# Patient Record
Sex: MALE | Race: AMERICAN INDIAN OR ALASKA NATIVE | Employment: OTHER | URBAN - METROPOLITAN AREA
[De-identification: names, ages, dates, MRNs, and addresses within clinical notes are randomized per-mention and may not be internally consistent; named-entity substitution may affect disease eponyms.]

---

## 2017-01-10 RX ORDER — FUROSEMIDE 20 MG/1
TABLET ORAL
Qty: 90 TABLET | Refills: 1 | Status: SHIPPED | OUTPATIENT
Start: 2017-01-10 | End: 2017-01-19 | Stop reason: SDUPTHER

## 2017-01-19 DIAGNOSIS — N52.1 ERECTILE DYSFUNCTION DUE TO DISEASES CLASSIFIED ELSEWHERE: ICD-10-CM

## 2017-01-20 RX ORDER — SILDENAFIL 50 MG/1
50 TABLET, FILM COATED ORAL PRN
Qty: 24 TABLET | Refills: 0 | Status: SHIPPED | OUTPATIENT
Start: 2017-01-20 | End: 2018-01-26

## 2017-01-20 RX ORDER — OLMESARTAN MEDOXOMIL 20 MG/1
TABLET ORAL
Qty: 90 TABLET | Refills: 0 | Status: SHIPPED | OUTPATIENT
Start: 2017-01-20 | End: 2017-06-27 | Stop reason: SDUPTHER

## 2017-01-20 RX ORDER — ALLOPURINOL 300 MG/1
TABLET ORAL
Qty: 90 TABLET | Refills: 0 | Status: SHIPPED | OUTPATIENT
Start: 2017-01-20 | End: 2017-06-27 | Stop reason: SDUPTHER

## 2017-01-20 RX ORDER — GLIMEPIRIDE 4 MG/1
TABLET ORAL
Qty: 90 TABLET | Refills: 0 | Status: SHIPPED | OUTPATIENT
Start: 2017-01-20 | End: 2017-05-18 | Stop reason: SDUPTHER

## 2017-01-20 RX ORDER — FUROSEMIDE 20 MG/1
TABLET ORAL
Qty: 90 TABLET | Refills: 0 | Status: SHIPPED | OUTPATIENT
Start: 2017-01-20 | End: 2017-04-18 | Stop reason: SDUPTHER

## 2017-04-18 RX ORDER — FUROSEMIDE 20 MG/1
TABLET ORAL
Qty: 90 TABLET | Refills: 0 | Status: SHIPPED | OUTPATIENT
Start: 2017-04-18 | End: 2017-07-17 | Stop reason: SDUPTHER

## 2017-05-18 RX ORDER — GLIMEPIRIDE 4 MG/1
TABLET ORAL
Qty: 90 TABLET | Refills: 0 | Status: SHIPPED | OUTPATIENT
Start: 2017-05-18 | End: 2017-08-14 | Stop reason: SDUPTHER

## 2017-05-20 ENCOUNTER — HOSPITAL ENCOUNTER (OUTPATIENT)
Dept: OTHER | Age: 57
Discharge: HOME | End: 2017-05-20
Attending: FAMILY MEDICINE | Admitting: FAMILY MEDICINE

## 2017-05-20 LAB
A/G RATIO: 1.4 (ref 1.1–2.2)
ALBUMIN SERPL-MCNC: 4.2 G/DL (ref 3.4–5)
ALP BLD-CCNC: 114 U/L (ref 40–129)
ALT SERPL-CCNC: 86 U/L (ref 10–40)
ANION GAP SERPL CALCULATED.3IONS-SCNC: 13 MMOL/L (ref 3–16)
AST SERPL-CCNC: 68 U/L (ref 15–37)
BILIRUB SERPL-MCNC: 0.6 MG/DL (ref 0–1)
BUN BLDV-MCNC: 15 MG/DL (ref 7–20)
CALCIUM SERPL-MCNC: 9.6 MG/DL (ref 8.3–10.6)
CHLORIDE BLD-SCNC: 96 MMOL/L (ref 99–110)
CHOLESTEROL, TOTAL: 186 MG/DL (ref 0–199)
CO2: 28 MMOL/L (ref 21–32)
CREAT SERPL-MCNC: 1.1 MG/DL (ref 0.9–1.3)
CREATININE URINE: 128 MG/DL (ref 39–259)
GFR AFRICAN AMERICAN: >60
GFR NON-AFRICAN AMERICAN: >60
GLOBULIN: 3 G/DL
GLUCOSE BLD-MCNC: 273 MG/DL (ref 70–99)
HDLC SERPL-MCNC: 41 MG/DL (ref 40–60)
LDL CHOLESTEROL CALCULATED: 113 MG/DL
MICROALBUMIN UR-MCNC: <1.2 MG/DL
MICROALBUMIN/CREAT UR-RTO: NORMAL MG/G (ref 0–30)
POTASSIUM SERPL-SCNC: 5 MMOL/L (ref 3.5–5.1)
PROSTATE SPECIFIC ANTIGEN: 4.6 NG/ML (ref 0–4)
SODIUM BLD-SCNC: 137 MMOL/L (ref 136–145)
TOTAL PROTEIN: 7.2 G/DL (ref 6.4–8.2)
TRIGL SERPL-MCNC: 162 MG/DL (ref 0–150)
VLDLC SERPL CALC-MCNC: 32 MG/DL

## 2017-05-21 LAB
ESTIMATED AVERAGE GLUCOSE: 286.2 MG/DL
HBA1C MFR BLD: 11.6 %

## 2017-06-10 RX ORDER — SITAGLIPTIN 100 MG/1
TABLET, FILM COATED ORAL
Qty: 90 TABLET | Refills: 0 | Status: SHIPPED | OUTPATIENT
Start: 2017-06-10 | End: 2017-09-11 | Stop reason: SDUPTHER

## 2017-06-19 ENCOUNTER — OFFICE VISIT (OUTPATIENT)
Dept: FAMILY MEDICINE CLINIC | Age: 57
End: 2017-06-19

## 2017-06-19 VITALS
BODY MASS INDEX: 41.18 KG/M2 | DIASTOLIC BLOOD PRESSURE: 74 MMHG | HEART RATE: 96 BPM | HEIGHT: 69 IN | WEIGHT: 278 LBS | OXYGEN SATURATION: 97 % | SYSTOLIC BLOOD PRESSURE: 118 MMHG

## 2017-06-19 DIAGNOSIS — I10 ESSENTIAL HYPERTENSION: ICD-10-CM

## 2017-06-19 DIAGNOSIS — E66.01 OBESITY, MORBID, BMI 40.0-49.9 (HCC): ICD-10-CM

## 2017-06-19 DIAGNOSIS — R97.20 ELEVATED PSA: ICD-10-CM

## 2017-06-19 PROCEDURE — 99214 OFFICE O/P EST MOD 30 MIN: CPT | Performed by: FAMILY MEDICINE

## 2017-06-19 ASSESSMENT — PATIENT HEALTH QUESTIONNAIRE - PHQ9
SUM OF ALL RESPONSES TO PHQ QUESTIONS 1-9: 0
1. LITTLE INTEREST OR PLEASURE IN DOING THINGS: 0
2. FEELING DOWN, DEPRESSED OR HOPELESS: 0
SUM OF ALL RESPONSES TO PHQ9 QUESTIONS 1 & 2: 0

## 2017-06-21 ENCOUNTER — TELEPHONE (OUTPATIENT)
Dept: FAMILY MEDICINE CLINIC | Age: 57
End: 2017-06-21

## 2017-06-22 ENCOUNTER — TELEPHONE (OUTPATIENT)
Dept: FAMILY MEDICINE CLINIC | Age: 57
End: 2017-06-22

## 2017-06-27 RX ORDER — ALLOPURINOL 300 MG/1
TABLET ORAL
Qty: 90 TABLET | Refills: 0 | Status: SHIPPED | OUTPATIENT
Start: 2017-06-27 | End: 2017-09-22 | Stop reason: SDUPTHER

## 2017-06-27 RX ORDER — OLMESARTAN MEDOXOMIL 20 MG/1
TABLET ORAL
Qty: 90 TABLET | Refills: 0 | Status: SHIPPED | OUTPATIENT
Start: 2017-06-27 | End: 2017-09-22 | Stop reason: SDUPTHER

## 2017-07-17 RX ORDER — FUROSEMIDE 20 MG/1
TABLET ORAL
Qty: 90 TABLET | Refills: 1 | Status: SHIPPED | OUTPATIENT
Start: 2017-07-17 | End: 2018-01-26 | Stop reason: SDUPTHER

## 2017-08-14 RX ORDER — GLIMEPIRIDE 4 MG/1
TABLET ORAL
Qty: 90 TABLET | Refills: 1 | Status: SHIPPED | OUTPATIENT
Start: 2017-08-14 | End: 2018-01-26 | Stop reason: SDUPTHER

## 2017-09-22 RX ORDER — ALLOPURINOL 300 MG/1
TABLET ORAL
Qty: 90 TABLET | Refills: 0 | Status: SHIPPED | OUTPATIENT
Start: 2017-09-22 | End: 2017-12-26 | Stop reason: SDUPTHER

## 2017-09-22 RX ORDER — OLMESARTAN MEDOXOMIL 20 MG/1
TABLET ORAL
Qty: 90 TABLET | Refills: 0 | Status: SHIPPED | OUTPATIENT
Start: 2017-09-22 | End: 2017-12-26 | Stop reason: SDUPTHER

## 2017-12-09 ENCOUNTER — HOSPITAL ENCOUNTER (OUTPATIENT)
Dept: OTHER | Age: 57
Discharge: HOME | End: 2017-12-09
Attending: FAMILY MEDICINE | Admitting: FAMILY MEDICINE

## 2017-12-09 LAB
ANION GAP SERPL CALCULATED.3IONS-SCNC: 13 MMOL/L (ref 3–16)
BUN BLDV-MCNC: 16 MG/DL (ref 7–20)
CALCIUM SERPL-MCNC: 9.5 MG/DL (ref 8.3–10.6)
CHLORIDE BLD-SCNC: 99 MMOL/L (ref 99–110)
CO2: 27 MMOL/L (ref 21–32)
CREAT SERPL-MCNC: 1.2 MG/DL (ref 0.9–1.3)
GFR AFRICAN AMERICAN: >60
GFR NON-AFRICAN AMERICAN: >60
GLUCOSE BLD-MCNC: 222 MG/DL (ref 70–99)
POTASSIUM SERPL-SCNC: 4.8 MMOL/L (ref 3.5–5.1)
PROSTATE SPECIFIC ANTIGEN: 3.7 NG/ML (ref 0–4)
SODIUM BLD-SCNC: 139 MMOL/L (ref 136–145)

## 2017-12-10 LAB
ESTIMATED AVERAGE GLUCOSE: 292 MG/DL
HBA1C MFR BLD: 11.8 %

## 2017-12-11 RX ORDER — SITAGLIPTIN 100 MG/1
TABLET, FILM COATED ORAL
Qty: 90 TABLET | Refills: 0 | Status: SHIPPED | OUTPATIENT
Start: 2017-12-11 | End: 2018-01-26 | Stop reason: SDUPTHER

## 2017-12-20 RX ORDER — OLMESARTAN MEDOXOMIL 20 MG/1
TABLET ORAL
Qty: 90 TABLET | Refills: 0 | OUTPATIENT
Start: 2017-12-20

## 2017-12-23 ENCOUNTER — TELEPHONE (OUTPATIENT)
Dept: FAMILY MEDICINE CLINIC | Age: 57
End: 2017-12-23

## 2017-12-23 NOTE — TELEPHONE ENCOUNTER
olmesartan (BENICAR) 20 MG tablet 90 tablet 0 9/22/2017     Sig: TAKE 1 TABLET BY MOUTH EVERY DAY      allopurinol (ZYLOPRIM) 300 MG tablet 90 tablet 0 9/22/2017     Sig: TAKE 1 TABLET BY MOUTH DAILY      Pt has appt scheduled 1/26 is requesting enough to get him through until then    Has colonoscopy 1/12  (FYI)    [facility] [address]

## 2017-12-26 RX ORDER — ALLOPURINOL 300 MG/1
TABLET ORAL
Qty: 30 TABLET | Refills: 0 | Status: SHIPPED | OUTPATIENT
Start: 2017-12-26 | End: 2018-01-26 | Stop reason: SDUPTHER

## 2017-12-26 RX ORDER — OLMESARTAN MEDOXOMIL 20 MG/1
TABLET ORAL
Qty: 30 TABLET | Refills: 0 | Status: SHIPPED | OUTPATIENT
Start: 2017-12-26 | End: 2018-01-26 | Stop reason: SDUPTHER

## 2018-01-12 ENCOUNTER — HOSPITAL ENCOUNTER (OUTPATIENT)
Dept: ENDOSCOPY | Age: 58
Discharge: HOME | End: 2018-01-12
Attending: INTERNAL MEDICINE | Admitting: INTERNAL MEDICINE

## 2018-01-12 LAB
GLUCOSE BLD-MCNC: 182 MG/DL (ref 70–99)
GLUCOSE BLD-MCNC: 217 MG/DL (ref 70–99)
PERFORMED ON: ABNORMAL
PERFORMED ON: ABNORMAL

## 2018-01-12 RX ORDER — SODIUM CHLORIDE 0.9 % (FLUSH) 0.9 %
10 SYRINGE (ML) INJECTION PRN
Status: DISCONTINUED | OUTPATIENT
Start: 2018-01-12 | End: 2018-01-13 | Stop reason: HOSPADM

## 2018-01-12 RX ORDER — SODIUM CHLORIDE 0.9 % (FLUSH) 0.9 %
10 SYRINGE (ML) INJECTION EVERY 12 HOURS SCHEDULED
Status: DISCONTINUED | OUTPATIENT
Start: 2018-01-12 | End: 2018-01-13 | Stop reason: HOSPADM

## 2018-01-12 RX ORDER — SODIUM CHLORIDE 9 MG/ML
INJECTION, SOLUTION INTRAVENOUS CONTINUOUS
Status: DISCONTINUED | OUTPATIENT
Start: 2018-01-12 | End: 2018-01-13 | Stop reason: HOSPADM

## 2018-01-12 ASSESSMENT — ENCOUNTER SYMPTOMS: SHORTNESS OF BREATH: 0

## 2018-01-12 NOTE — ANESTHESIA PRE-OP
SR) 75 MG SR capsule Take 1 capsule by mouth 2 times daily as needed for Pain 5/25/16   Monisha Buckley MD       Current Outpatient Prescriptions   Medication Sig Dispense Refill    olmesartan (BENICAR) 20 MG tablet TAKE 1 TABLET BY MOUTH EVERY DAY 30 tablet 0    allopurinol (ZYLOPRIM) 300 MG tablet TAKE 1 TABLET BY MOUTH DAILY 30 tablet 0    JANUVIA 100 MG tablet TAKE 1 TABLET BY MOUTH DAILY 90 tablet 0    glimepiride (AMARYL) 4 MG tablet TAKE 1 TABLET EVERY MORNING BEFORE BREAKFAST 90 tablet 1    metFORMIN (GLUCOPHAGE) 1000 MG tablet TAKE 1 TABLET TWICE A DAY WITH MEALS 180 tablet 1    furosemide (LASIX) 20 MG tablet TAKE 1 TABLET DAILY 90 tablet 1    sildenafil (VIAGRA) 50 MG tablet Take 1 tablet by mouth as needed for Erectile Dysfunction 24 tablet 0    aspirin EC 81 MG EC tablet Take 1 tablet by mouth daily 30 tablet 11    indomethacin (INDOCIN SR) 75 MG SR capsule Take 1 capsule by mouth 2 times daily as needed for Pain 60 capsule 5     Current Facility-Administered Medications   Medication Dose Route Frequency Provider Last Rate Last Dose    0.9 % sodium chloride infusion   Intravenous Continuous Ashwin Sands MD        sodium chloride flush 0.9 % injection 10 mL  10 mL Intravenous 2 times per day Ashwin Sands MD        sodium chloride flush 0.9 % injection 10 mL  10 mL Intravenous PRN Ashwin Sands MD           Vital Signs  (Current) There were no vitals filed for this visit.   (for past 48 hrs)  No Data Recorded  (last three values)   BP Readings from Last 3 Encounters:   06/19/17 118/74   11/11/16 104/72   05/25/16 108/78       CBC  No results found for: WBC, RBC, HGB, HCT, MCV, RDW, PLT    CMP    Lab Results   Component Value Date     12/09/2017    K 4.8 12/09/2017    CL 99 12/09/2017    CO2 27 12/09/2017    BUN 16 12/09/2017    CREATININE 1.2 12/09/2017    GFRAA >60 12/09/2017    GFRAA >60 11/17/2012    AGRATIO 1.4 05/20/2017    LABGLOM >60 12/09/2017    GLUCOSE 222 12/09/2017

## 2018-01-20 ENCOUNTER — HOSPITAL ENCOUNTER (OUTPATIENT)
Dept: OTHER | Age: 58
Discharge: HOME | End: 2018-01-20
Attending: INTERNAL MEDICINE | Admitting: INTERNAL MEDICINE

## 2018-01-20 DIAGNOSIS — R10.9 STOMACH ACHE: ICD-10-CM

## 2018-01-26 ENCOUNTER — OFFICE VISIT (OUTPATIENT)
Dept: FAMILY MEDICINE CLINIC | Age: 58
End: 2018-01-26

## 2018-01-26 VITALS
SYSTOLIC BLOOD PRESSURE: 110 MMHG | BODY MASS INDEX: 38.27 KG/M2 | OXYGEN SATURATION: 97 % | HEART RATE: 84 BPM | HEIGHT: 69 IN | DIASTOLIC BLOOD PRESSURE: 80 MMHG | WEIGHT: 258.4 LBS

## 2018-01-26 DIAGNOSIS — I10 ESSENTIAL HYPERTENSION: ICD-10-CM

## 2018-01-26 DIAGNOSIS — E66.01 OBESITY, MORBID, BMI 40.0-49.9 (HCC): ICD-10-CM

## 2018-01-26 DIAGNOSIS — N40.1 BENIGN PROSTATIC HYPERPLASIA WITH LOWER URINARY TRACT SYMPTOMS, SYMPTOM DETAILS UNSPECIFIED: ICD-10-CM

## 2018-01-26 PROCEDURE — 99214 OFFICE O/P EST MOD 30 MIN: CPT | Performed by: FAMILY MEDICINE

## 2018-01-26 RX ORDER — FUROSEMIDE 20 MG/1
TABLET ORAL
Qty: 90 TABLET | Refills: 1 | Status: SHIPPED | OUTPATIENT
Start: 2018-01-26 | End: 2018-09-18 | Stop reason: SDUPTHER

## 2018-01-26 RX ORDER — ALLOPURINOL 300 MG/1
TABLET ORAL
Qty: 90 TABLET | Refills: 1 | Status: SHIPPED | OUTPATIENT
Start: 2018-01-26 | End: 2018-07-06 | Stop reason: SDUPTHER

## 2018-01-26 RX ORDER — GLIMEPIRIDE 4 MG/1
TABLET ORAL
Qty: 90 TABLET | Refills: 1 | Status: ON HOLD | OUTPATIENT
Start: 2018-01-26 | End: 2018-03-19 | Stop reason: HOSPADM

## 2018-01-26 RX ORDER — OMEPRAZOLE 20 MG/1
20 CAPSULE, DELAYED RELEASE ORAL DAILY PRN
COMMUNITY
End: 2018-04-13

## 2018-01-26 RX ORDER — OLMESARTAN MEDOXOMIL 20 MG/1
TABLET ORAL
Qty: 90 TABLET | Refills: 1 | Status: SHIPPED | OUTPATIENT
Start: 2018-01-26 | End: 2018-03-27

## 2018-01-26 NOTE — PATIENT INSTRUCTIONS
Patient Education        Benign Prostatic Hyperplasia: Care Instructions  Your Care Instructions    Benign prostatic hyperplasia, or BPH, is an enlarged prostate gland. The prostate is a small gland that makes some of the fluid in semen. Prostate enlargement happens to almost all men as they age. It is usually not serious. BPH does not cause prostate cancer. As the prostate gets bigger, it may partly block the flow of urine. You may have a hard time getting a urine stream started or completely stopped. BPH can cause dribbling. You may have a weak urine stream, or you may have to urinate more often than you used to, especially at night. Most men find these problems easy to manage. You do not need treatment unless your symptoms bother you a lot or you have other problems, such as bladder infections or stones. In these cases, medicines may help. Surgery is not needed unless the urine flow is blocked or the symptoms do not get better with medicine. Follow-up care is a key part of your treatment and safety. Be sure to make and go to all appointments, and call your doctor if you are having problems. It's also a good idea to know your test results and keep a list of the medicines you take. How can you care for yourself at home? · Take plenty of time to urinate. Try to relax. · Try \"double voiding. \" Urinate as much you can, relax for a few moments, and then try to urinate again. · Sit on the toilet to urinate. · Read or think of other things while you are waiting. · Turn on a faucet, or try to picture running water. Some men find that this helps get their urine flowing. · If dribbling is a problem, wash your penis daily to avoid skin irritation and infection. · Avoid caffeine and alcohol. These drinks will increase how often you need to urinate. Spread your fluid intake throughout the day. If the urge to urinate often wakes you at night, limit your fluid intake in the evening.  Urinate right before you go to bed.  · Many over-the-counter cold and allergy medicines can make the symptoms of BPH worse. Avoid antihistamines, decongestants, and allergy pills, if you can. Read the warnings on the package. · If you take any prescription medicines, especially tranquilizers or antidepressants, ask your doctor or pharmacist whether they can cause urination problems. There may be other medicines you can use that do not cause urinary problems. · Be safe with medicines. Take your medicines exactly as prescribed. Call your doctor if you think you are having a problem with your medicine. When should you call for help? Call your doctor now or seek immediate medical care if:  ? · You cannot urinate at all. ? · You have symptoms of a urinary infection. For example:  ¨ You have blood or pus in your urine. ¨ You have pain in your back just below your rib cage. This is called flank pain. ¨ You have a fever, chills, or body aches. ¨ It hurts to urinate. ¨ You have groin or belly pain. ? Watch closely for changes in your health, and be sure to contact your doctor if:  ? · It hurts when you ejaculate. ? · Your urinary problems get a lot worse or bother you a lot. Where can you learn more? Go to https://[email or web address] org and sign in to your [employer] account. Enter V675 in the [facility] box to learn more about \"Benign Prostatic Hyperplasia: Care Instructions. \"     If you do not have an account, please click on the \"Sign Up Now\" link. Current as of: March 14, 2017  Content Version: 11.5  © 0572-3963 [employer]. Care instructions adapted under license by [facility] ([facility]). If you have questions about a medical condition or this instruction, always ask your healthcare professional. [place] [address] any warranty or liability for your use of this information.        Patient Education        Learning About Diabetes Food Guidelines  Your Care Instructions    Meal planning is

## 2018-02-12 ENCOUNTER — OFFICE VISIT (OUTPATIENT)
Dept: SURGERY | Age: 58
End: 2018-02-12

## 2018-02-12 VITALS
DIASTOLIC BLOOD PRESSURE: 66 MMHG | HEIGHT: 69 IN | BODY MASS INDEX: 37.62 KG/M2 | SYSTOLIC BLOOD PRESSURE: 102 MMHG | WEIGHT: 254 LBS

## 2018-02-12 DIAGNOSIS — R63.4 WEIGHT LOSS: Primary | ICD-10-CM

## 2018-02-12 PROCEDURE — 99243 OFF/OP CNSLTJ NEW/EST LOW 30: CPT | Performed by: SURGERY

## 2018-02-12 ASSESSMENT — ENCOUNTER SYMPTOMS
ALLERGIC/IMMUNOLOGIC NEGATIVE: 1
COUGH: 1
BACK PAIN: 1
CHOKING: 1
VOMITING: 1
DIARRHEA: 1
ABDOMINAL PAIN: 1
EYES NEGATIVE: 1
NAUSEA: 1
SORE THROAT: 1

## 2018-02-12 NOTE — PROGRESS NOTES
Subjective:      Shaina Francois is a 61 y.o. male     CC: Nausea and vomiting    HPI: 59-year-old male who presents with a 10 week history of nausea and vomiting. The patient reports that he is not able to keep anything down. He has had about a 30 pound weight loss over this period of time. He has occasional epigastric abdominal pain but this is somewhat vague. No history of fevers, chills, change in bowel habits or urinary symptoms. Right upper quadrant ultrasound shows gallstones without secondary signs of cholecystitis. Recent colonoscopy per Dr. Pallavi Washburn was unremarkable other than showing polyps. Family History   Problem Relation Age of Onset    High Blood Pressure Father     Heart Disease Father      atrial fibrillation    Cancer Father      colon    Diabetes Maternal Aunt     Diabetes Paternal Grandmother     Diabetes Paternal Grandfather        Past Medical History:   Diagnosis Date    Allergic rhinitis     Gout     Hypertension     Type II or unspecified type diabetes mellitus without mention of complication, not stated as uncontrolled        Past Surgical History:   Procedure Laterality Date    KNEE ARTHROSCOPY Left     SHOULDER ARTHROPLASTY Right 2012    rotator cuff repair           Prior to Visit Medications    Medication Sig Taking?  Authorizing Provider   olmesartan (BENICAR) 20 MG tablet TAKE 1 TABLET BY MOUTH EVERY DAY Yes Johanna Hernandez MD   allopurinol (ZYLOPRIM) 300 MG tablet TAKE 1 TABLET BY MOUTH DAILY Yes Johanna Hernandez MD   SITagliptin (JANUVIA) 100 MG tablet TAKE 1 TABLET BY MOUTH DAILY Yes Johanna Hernandez MD   glimepiride (AMARYL) 4 MG tablet TAKE 1 TABLET EVERY MORNING BEFORE BREAKFAST Yes Johanna Hernandez MD   metFORMIN (GLUCOPHAGE) 1000 MG tablet TAKE 1 TABLET TWICE A DAY WITH MEALS Yes Johanna Hernandez MD   furosemide (LASIX) 20 MG tablet TAKE 1 TABLET DAILY Yes Johanna Hernandez MD   omeprazole (PRILOSEC) 20 MG delayed release capsule Take 20

## 2018-02-15 ENCOUNTER — HOSPITAL ENCOUNTER (OUTPATIENT)
Dept: CT IMAGING | Age: 58
Discharge: HOME | End: 2018-02-15
Attending: SURGERY | Admitting: SURGERY

## 2018-02-15 DIAGNOSIS — R63.4 WEIGHT LOSS: ICD-10-CM

## 2018-02-15 DIAGNOSIS — R63.4 ABNORMAL WEIGHT LOSS: ICD-10-CM

## 2018-02-20 ENCOUNTER — TELEPHONE (OUTPATIENT)
Dept: FAMILY MEDICINE CLINIC | Age: 58
End: 2018-02-20

## 2018-02-20 PROBLEM — N17.9 ACUTE RENAL FAILURE (ARF) (HCC): Status: ACTIVE | Noted: 2018-02-20

## 2018-02-20 PROBLEM — N17.9 AKI (ACUTE KIDNEY INJURY) (HCC): Status: ACTIVE | Noted: 2018-02-20

## 2018-02-20 PROBLEM — I95.9 HYPOTENSION: Status: ACTIVE | Noted: 2018-02-20

## 2018-02-21 PROBLEM — E43 SEVERE MALNUTRITION (HCC): Chronic | Status: ACTIVE | Noted: 2018-02-21

## 2018-02-23 ENCOUNTER — TELEPHONE (OUTPATIENT)
Dept: SURGERY | Age: 58
End: 2018-02-23

## 2018-02-23 PROBLEM — E04.1 THYROID NODULE: Status: ACTIVE | Noted: 2018-02-23

## 2018-02-23 PROBLEM — D13.30: Status: ACTIVE | Noted: 2018-02-23

## 2018-03-20 ENCOUNTER — TELEPHONE (OUTPATIENT)
Dept: FAMILY MEDICINE CLINIC | Age: 58
End: 2018-03-20

## 2018-03-21 ENCOUNTER — TELEPHONE (OUTPATIENT)
Dept: FAMILY MEDICINE CLINIC | Age: 58
End: 2018-03-21

## 2018-03-27 ENCOUNTER — OFFICE VISIT (OUTPATIENT)
Dept: FAMILY MEDICINE CLINIC | Age: 58
End: 2018-03-27

## 2018-03-27 VITALS
DIASTOLIC BLOOD PRESSURE: 62 MMHG | WEIGHT: 244.2 LBS | OXYGEN SATURATION: 97 % | HEART RATE: 94 BPM | SYSTOLIC BLOOD PRESSURE: 112 MMHG | BODY MASS INDEX: 37.13 KG/M2

## 2018-03-27 DIAGNOSIS — E11.65 UNCONTROLLED TYPE 2 DIABETES MELLITUS WITH HYPERGLYCEMIA, WITHOUT LONG-TERM CURRENT USE OF INSULIN (HCC): ICD-10-CM

## 2018-03-27 DIAGNOSIS — N17.9 AKI (ACUTE KIDNEY INJURY) (HCC): ICD-10-CM

## 2018-03-27 DIAGNOSIS — D13.30 TUBULOVILLOUS ADENOMA OF SMALL INTESTINE: Primary | ICD-10-CM

## 2018-03-27 DIAGNOSIS — I95.0 IDIOPATHIC HYPOTENSION: ICD-10-CM

## 2018-03-27 DIAGNOSIS — F51.02 ADJUSTMENT INSOMNIA: ICD-10-CM

## 2018-03-27 DIAGNOSIS — C25.0 MALIGNANT NEOPLASM OF HEAD OF PANCREAS (HCC): ICD-10-CM

## 2018-03-27 PROCEDURE — 99215 OFFICE O/P EST HI 40 MIN: CPT | Performed by: FAMILY MEDICINE

## 2018-03-27 RX ORDER — LORAZEPAM 0.5 MG/1
0.5 TABLET ORAL NIGHTLY
Qty: 30 TABLET | Refills: 0 | Status: SHIPPED | OUTPATIENT
Start: 2018-03-27 | End: 2018-04-18 | Stop reason: SDUPTHER

## 2018-03-27 NOTE — LETTER
[address] [facility] [address]  Phone: [phone]  Fax: [phone]    Veda Casarez MD        March 27, 2018     Patient: Priya Miller   YOB: 1960   Date of Visit: 3/27/2018       To Whom It May Concern: It is my medical opinion that Isla Schaumann requires a disability parking placard for the following reasons:  He has limited walking ability due to a neurologic condition. Duration of need: permanent    If you have any questions or concerns, please don't hesitate to call.     Sincerely,        Veda Casarez MD

## 2018-03-27 NOTE — PROGRESS NOTES
of small intestine    Thyroid nodule    Chronic cholecystitis with calculus       Outpatient Prescriptions Marked as Taking for the 3/27/18 encounter (Office Visit) with Ciara Emmanuel MD   Medication Sig Dispense Refill    insulin aspart (NOVOLOG FLEXPEN) 100 UNIT/ML injection pen Inject into the skin 3 times daily (before meals) 140-199- 2 units; 200-249- 3 units; 250-299-4 units; 300+- 6units      insulin glargine (LANTUS) 100 UNIT/ML injection pen Inject 12 Units into the skin every morning 5 pen 3    tamsulosin (FLOMAX) 0.4 MG capsule Take 1 capsule by mouth 2 times daily 30 capsule 3    FREESTYLE LANCETS MISC 1 each by Does not apply route daily 100 each 3    Blood Glucose Monitoring Suppl (FREESTYLE FREEDOM LITE) w/Device KIT Check sugars before meals and before bed. 1 kit 0    glucose blood VI test strips (FREESTYLE LITE) strip 1 each by In Vitro route daily As needed. 100 each 3    amoxicillin-clavulanate (AUGMENTIN) 875-125 MG per tablet Take 1 tablet by mouth 2 times daily for 10 days 20 tablet 0    allopurinol (ZYLOPRIM) 300 MG tablet TAKE 1 TABLET BY MOUTH DAILY 90 tablet 1    furosemide (LASIX) 20 MG tablet TAKE 1 TABLET DAILY 90 tablet 1    omeprazole (PRILOSEC) 20 MG delayed release capsule Take 20 mg by mouth daily as needed       aspirin EC 81 MG EC tablet Take 1 tablet by mouth daily 30 tablet 11       No Known Allergies    Social History   Substance Use Topics    Smoking status: Never Smoker    Smokeless tobacco: Never Used    Alcohol use No       /62 (Site: Left Arm)   Pulse 94   Wt 244 lb 3.2 oz (110.8 kg)   SpO2 97%   BMI 37.13 kg/m²         Objective:   Physical Exam   Constitutional: He appears well-developed and well-nourished. He is cooperative. No distress (Pale). Neck: Carotid bruit is not present. Cardiovascular: Normal rate, regular rhythm and normal heart sounds. No murmur heard.   Pulses:       Dorsalis pedis pulses are 2+ on the right side, and 2+ on the left side. Posterior tibial pulses are 2+ on the right side, and 2+ on the left side. Pulmonary/Chest: Effort normal and breath sounds normal.   Abdominal: Soft. Bowel sounds are normal. He exhibits no distension. Upper abdominal incision healing well. G-tube in place in the left upper quadrant and to drainage ports noted in the right side. Skin shows no infection   Musculoskeletal: Normal range of motion. He exhibits edema (2+ leg edema from mid calf to top of foot). He exhibits no tenderness. Neurological: He is alert. He has normal strength. No sensory deficit. Psychiatric: He has a normal mood and affect. His behavior is normal. Judgment and thought content normal.       Assessment:      Rj Gooden was seen today for follow-up from hospital.    Diagnoses and all orders for this visit:    Tubulovillous adenoma of small intestine    ZEV (acute kidney injury) ([facility]    Adjustment insomnia    Uncontrolled type 2 diabetes mellitus with hyperglycemia, without long-term current use of insulin (HCC)    Idiopathic hypotension    Malignant neoplasm of head of pancreas ([facility]    Other orders  -     LORazepam (ATIVAN) 0.5 MG tablet; Take 1 tablet by mouth nightly for 30 days. OARRS report checked          Plan:      Hospital information reviewed with patient and family  Maintain current insulin management for his diabetes mellitus and encouraged portion size control  Use lorazepam for adjustment insomnia  No blood pressure management at this point time  Agreed with point with surgery and oncologist.  RTC 1 month and he will need repeat laboratory profiling at that point time including a CBC and a BMP  Medical decision making of high complexity      Please note that this chart was generated using Dragon dictation software. Although every effort was made to ensure the accuracy of this automated transcription, some errors in transcription may have occurred.

## 2018-04-03 ENCOUNTER — OFFICE VISIT (OUTPATIENT)
Dept: SURGERY | Age: 58
End: 2018-04-03

## 2018-04-03 VITALS — SYSTOLIC BLOOD PRESSURE: 125 MMHG | WEIGHT: 238 LBS | BODY MASS INDEX: 36.19 KG/M2 | DIASTOLIC BLOOD PRESSURE: 78 MMHG

## 2018-04-03 DIAGNOSIS — C25.0 MALIGNANT NEOPLASM OF HEAD OF PANCREAS (HCC): Primary | ICD-10-CM

## 2018-04-03 PROCEDURE — 99024 POSTOP FOLLOW-UP VISIT: CPT | Performed by: SURGERY

## 2018-04-09 ENCOUNTER — TELEPHONE (OUTPATIENT)
Dept: FAMILY MEDICINE CLINIC | Age: 58
End: 2018-04-09

## 2018-04-09 RX ORDER — BLOOD-GLUCOSE METER
1 EACH MISCELLANEOUS DAILY
Qty: 1 KIT | Refills: 0 | OUTPATIENT
Start: 2018-04-09 | End: 2020-04-29

## 2018-04-16 ENCOUNTER — HOSPITAL ENCOUNTER (OUTPATIENT)
Dept: INTERVENTIONAL RADIOLOGY/VASCULAR | Age: 58
Discharge: HOME | End: 2018-04-16
Attending: INTERNAL MEDICINE | Admitting: INTERNAL MEDICINE

## 2018-04-16 VITALS
DIASTOLIC BLOOD PRESSURE: 80 MMHG | SYSTOLIC BLOOD PRESSURE: 119 MMHG | TEMPERATURE: 97.1 F | HEART RATE: 86 BPM | OXYGEN SATURATION: 93 % | RESPIRATION RATE: 16 BRPM

## 2018-04-16 DIAGNOSIS — Z95.828 PORT-A-CATH IN PLACE: ICD-10-CM

## 2018-04-16 LAB
ANION GAP SERPL CALCULATED.3IONS-SCNC: 13 MMOL/L (ref 3–16)
APTT: 31.3 SEC (ref 24.1–34.9)
BASOPHILS ABSOLUTE: 0.1 K/UL (ref 0–0.2)
BASOPHILS RELATIVE PERCENT: 1 %
BUN BLDV-MCNC: 13 MG/DL (ref 7–20)
CALCIUM SERPL-MCNC: 9.8 MG/DL (ref 8.3–10.6)
CHLORIDE BLD-SCNC: 98 MMOL/L (ref 99–110)
CO2: 27 MMOL/L (ref 21–32)
CREAT SERPL-MCNC: 0.8 MG/DL (ref 0.9–1.3)
EOSINOPHILS ABSOLUTE: 0.2 K/UL (ref 0–0.6)
EOSINOPHILS RELATIVE PERCENT: 4.1 %
GFR AFRICAN AMERICAN: >60
GFR NON-AFRICAN AMERICAN: >60
GLUCOSE BLD-MCNC: 152 MG/DL (ref 70–99)
HCT VFR BLD CALC: 34.2 % (ref 40.5–52.5)
HEMOGLOBIN: 10.6 G/DL (ref 13.5–17.5)
INR BLD: 1.06 (ref 0.85–1.15)
LYMPHOCYTES ABSOLUTE: 1.2 K/UL (ref 1–5.1)
LYMPHOCYTES RELATIVE PERCENT: 22.2 %
MCH RBC QN AUTO: 24 PG (ref 26–34)
MCHC RBC AUTO-ENTMCNC: 31.1 G/DL (ref 31–36)
MCV RBC AUTO: 77.2 FL (ref 80–100)
MONOCYTES ABSOLUTE: 0.4 K/UL (ref 0–1.3)
MONOCYTES RELATIVE PERCENT: 7.8 %
NEUTROPHILS ABSOLUTE: 3.4 K/UL (ref 1.7–7.7)
NEUTROPHILS RELATIVE PERCENT: 64.9 %
PDW BLD-RTO: 16.6 % (ref 12.4–15.4)
PLATELET # BLD: 258 K/UL (ref 135–450)
PMV BLD AUTO: 7 FL (ref 5–10.5)
POTASSIUM SERPL-SCNC: 3.9 MMOL/L (ref 3.5–5.1)
PROTHROMBIN TIME: 12 SEC (ref 9.6–13)
RBC # BLD: 4.43 M/UL (ref 4.2–5.9)
SODIUM BLD-SCNC: 138 MMOL/L (ref 136–145)
WBC # BLD: 5.3 K/UL (ref 4–11)

## 2018-04-16 RX ORDER — FENTANYL CITRATE 50 UG/ML
INJECTION, SOLUTION INTRAMUSCULAR; INTRAVENOUS
Status: COMPLETED | OUTPATIENT
Start: 2018-04-16 | End: 2018-04-16

## 2018-04-16 RX ORDER — CEFAZOLIN SODIUM 2 G/100ML
1 INJECTION, SOLUTION INTRAVENOUS ONCE
Status: DISCONTINUED | OUTPATIENT
Start: 2018-04-16 | End: 2018-04-17 | Stop reason: HOSPADM

## 2018-04-16 RX ORDER — MIDAZOLAM HYDROCHLORIDE 1 MG/ML
INJECTION INTRAMUSCULAR; INTRAVENOUS
Status: COMPLETED | OUTPATIENT
Start: 2018-04-16 | End: 2018-04-16

## 2018-04-16 RX ORDER — OXYCODONE HYDROCHLORIDE AND ACETAMINOPHEN 5; 325 MG/1; MG/1
2 TABLET ORAL EVERY 4 HOURS PRN
Status: DISCONTINUED | OUTPATIENT
Start: 2018-04-16 | End: 2018-04-17 | Stop reason: HOSPADM

## 2018-04-16 RX ORDER — ACETAMINOPHEN 325 MG/1
650 TABLET ORAL EVERY 4 HOURS PRN
Status: DISCONTINUED | OUTPATIENT
Start: 2018-04-16 | End: 2018-04-17 | Stop reason: HOSPADM

## 2018-04-16 RX ORDER — OXYCODONE HYDROCHLORIDE AND ACETAMINOPHEN 5; 325 MG/1; MG/1
1 TABLET ORAL EVERY 4 HOURS PRN
Status: DISCONTINUED | OUTPATIENT
Start: 2018-04-16 | End: 2018-04-17 | Stop reason: HOSPADM

## 2018-04-16 RX ORDER — ONDANSETRON 2 MG/ML
4 INJECTION INTRAMUSCULAR; INTRAVENOUS EVERY 8 HOURS PRN
Status: DISCONTINUED | OUTPATIENT
Start: 2018-04-16 | End: 2018-04-17 | Stop reason: HOSPADM

## 2018-04-16 RX ADMIN — FENTANYL CITRATE 50 MCG: 50 INJECTION, SOLUTION INTRAMUSCULAR; INTRAVENOUS at 10:41

## 2018-04-16 RX ADMIN — MIDAZOLAM HYDROCHLORIDE 1 MG: 1 INJECTION INTRAMUSCULAR; INTRAVENOUS at 10:41

## 2018-04-16 ASSESSMENT — PAIN SCALES - GENERAL
PAINLEVEL_OUTOF10: 0

## 2018-04-17 ENCOUNTER — OFFICE VISIT (OUTPATIENT)
Dept: SURGERY | Age: 58
End: 2018-04-17

## 2018-04-17 VITALS — SYSTOLIC BLOOD PRESSURE: 126 MMHG | DIASTOLIC BLOOD PRESSURE: 80 MMHG | BODY MASS INDEX: 33.6 KG/M2 | WEIGHT: 221 LBS

## 2018-04-17 DIAGNOSIS — C25.0 MALIGNANT NEOPLASM OF HEAD OF PANCREAS (HCC): Primary | ICD-10-CM

## 2018-04-17 PROCEDURE — 99024 POSTOP FOLLOW-UP VISIT: CPT | Performed by: SURGERY

## 2018-04-18 RX ORDER — LORAZEPAM 0.5 MG/1
0.5 TABLET ORAL NIGHTLY
Qty: 30 TABLET | Refills: 0 | Status: SHIPPED | OUTPATIENT
Start: 2018-04-18 | End: 2018-05-18

## 2018-04-30 RX ORDER — LORAZEPAM 0.5 MG/1
0.5 TABLET ORAL NIGHTLY
Qty: 30 TABLET | Refills: 0 | OUTPATIENT
Start: 2018-04-30 | End: 2018-05-30

## 2018-05-08 ENCOUNTER — OFFICE VISIT (OUTPATIENT)
Dept: SURGERY | Age: 58
End: 2018-05-08

## 2018-05-08 VITALS — WEIGHT: 210 LBS | BODY MASS INDEX: 31.93 KG/M2 | SYSTOLIC BLOOD PRESSURE: 116 MMHG | DIASTOLIC BLOOD PRESSURE: 76 MMHG

## 2018-05-08 DIAGNOSIS — C25.0 MALIGNANT NEOPLASM OF HEAD OF PANCREAS (HCC): Primary | ICD-10-CM

## 2018-05-08 PROCEDURE — 99024 POSTOP FOLLOW-UP VISIT: CPT | Performed by: SURGERY

## 2018-05-08 RX ORDER — DIPHENOXYLATE HYDROCHLORIDE AND ATROPINE SULFATE 2.5; .025 MG/1; MG/1
2 TABLET ORAL 4 TIMES DAILY PRN
COMMUNITY
Start: 2018-05-07 | End: 2019-05-06

## 2018-05-08 RX ORDER — FINASTERIDE 5 MG/1
TABLET, FILM COATED ORAL
Refills: 3 | COMMUNITY
Start: 2018-04-09 | End: 2018-06-06

## 2018-05-14 ENCOUNTER — OFFICE VISIT (OUTPATIENT)
Dept: FAMILY MEDICINE CLINIC | Age: 58
End: 2018-05-14

## 2018-05-14 VITALS
BODY MASS INDEX: 31.09 KG/M2 | OXYGEN SATURATION: 97 % | SYSTOLIC BLOOD PRESSURE: 104 MMHG | WEIGHT: 204.5 LBS | RESPIRATION RATE: 12 BRPM | HEART RATE: 90 BPM | DIASTOLIC BLOOD PRESSURE: 77 MMHG

## 2018-05-14 DIAGNOSIS — R63.4 WEIGHT LOSS: ICD-10-CM

## 2018-05-14 DIAGNOSIS — R19.7 POSTCHOLECYSTECTOMY DIARRHEA: Primary | ICD-10-CM

## 2018-05-14 DIAGNOSIS — N40.1 BENIGN PROSTATIC HYPERPLASIA WITH LOWER URINARY TRACT SYMPTOMS, SYMPTOM DETAILS UNSPECIFIED: ICD-10-CM

## 2018-05-14 DIAGNOSIS — E11.65 UNCONTROLLED TYPE 2 DIABETES MELLITUS WITH HYPERGLYCEMIA, WITHOUT LONG-TERM CURRENT USE OF INSULIN (HCC): ICD-10-CM

## 2018-05-14 DIAGNOSIS — C25.0 MALIGNANT NEOPLASM OF HEAD OF PANCREAS (HCC): ICD-10-CM

## 2018-05-14 DIAGNOSIS — K91.89 POSTCHOLECYSTECTOMY DIARRHEA: Primary | ICD-10-CM

## 2018-05-14 PROCEDURE — 99214 OFFICE O/P EST MOD 30 MIN: CPT | Performed by: FAMILY MEDICINE

## 2018-05-14 RX ORDER — POTASSIUM CHLORIDE 20MEQ/15ML
LIQUID (ML) ORAL
Refills: 0 | Status: ON HOLD | COMMUNITY
Start: 2018-05-10 | End: 2018-08-29 | Stop reason: HOSPADM

## 2018-05-14 RX ORDER — CHOLESTYRAMINE LIGHT 4 G/5.7G
4 POWDER, FOR SUSPENSION ORAL DAILY
Qty: 30 PACKET | Refills: 3 | Status: SHIPPED | OUTPATIENT
Start: 2018-05-14 | End: 2018-06-06

## 2018-05-15 ENCOUNTER — POST-OP TELEPHONE (OUTPATIENT)
Dept: INTERVENTIONAL RADIOLOGY/VASCULAR | Age: 58
End: 2018-05-15

## 2018-05-15 NOTE — PROGRESS NOTES
Left patient a message 30 days post portacath procedure, and directed patient to contact Special Procedures if they had any further questions or concerns.

## 2018-05-16 PROBLEM — I95.9 HYPOTENSION: Status: RESOLVED | Noted: 2018-02-20 | Resolved: 2018-05-16

## 2018-05-16 PROBLEM — N17.9 AKI (ACUTE KIDNEY INJURY) (HCC): Status: RESOLVED | Noted: 2018-02-20 | Resolved: 2018-05-16

## 2018-05-16 PROBLEM — N17.9 ACUTE RENAL FAILURE (ARF) (HCC): Status: RESOLVED | Noted: 2018-02-20 | Resolved: 2018-05-16

## 2018-05-20 PROBLEM — A41.9 SEPSIS (HCC): Status: ACTIVE | Noted: 2018-05-20

## 2018-06-15 ENCOUNTER — TELEPHONE (OUTPATIENT)
Dept: INPATIENT UNIT | Age: 58
End: 2018-06-15

## 2018-06-19 ENCOUNTER — HOSPITAL ENCOUNTER (OUTPATIENT)
Dept: SURGERY | Age: 58
Discharge: HOME | End: 2018-06-19
Attending: UROLOGY | Admitting: UROLOGY

## 2018-06-19 VITALS
HEART RATE: 69 BPM | RESPIRATION RATE: 18 BRPM | WEIGHT: 209.44 LBS | BODY MASS INDEX: 30.93 KG/M2 | TEMPERATURE: 96.9 F | OXYGEN SATURATION: 99 % | SYSTOLIC BLOOD PRESSURE: 114 MMHG | DIASTOLIC BLOOD PRESSURE: 77 MMHG

## 2018-06-19 DIAGNOSIS — N40.1 BPH WITH OBSTRUCTION/LOWER URINARY TRACT SYMPTOMS: Primary | ICD-10-CM

## 2018-06-19 DIAGNOSIS — N13.8 BPH WITH OBSTRUCTION/LOWER URINARY TRACT SYMPTOMS: Primary | ICD-10-CM

## 2018-06-19 LAB
ANION GAP SERPL CALCULATED.3IONS-SCNC: 10 MMOL/L (ref 3–16)
BUN BLDV-MCNC: 12 MG/DL (ref 7–20)
CALCIUM SERPL-MCNC: 8.9 MG/DL (ref 8.3–10.6)
CHLORIDE BLD-SCNC: 103 MMOL/L (ref 99–110)
CO2: 27 MMOL/L (ref 21–32)
CREAT SERPL-MCNC: 0.8 MG/DL (ref 0.9–1.3)
GFR AFRICAN AMERICAN: >60
GFR NON-AFRICAN AMERICAN: >60
GLUCOSE BLD-MCNC: 157 MG/DL (ref 70–99)
GLUCOSE BLD-MCNC: 159 MG/DL (ref 70–99)
PERFORMED ON: ABNORMAL
POTASSIUM SERPL-SCNC: 3.6 MMOL/L (ref 3.5–5.1)
SODIUM BLD-SCNC: 140 MMOL/L (ref 136–145)

## 2018-06-19 RX ORDER — CIPROFLOXACIN 2 MG/ML
400 INJECTION, SOLUTION INTRAVENOUS
Status: COMPLETED | OUTPATIENT
Start: 2018-06-19 | End: 2018-06-19

## 2018-06-19 RX ORDER — CIPROFLOXACIN 500 MG/1
500 TABLET, FILM COATED ORAL 2 TIMES DAILY
Qty: 10 TABLET | Refills: 0 | Status: SHIPPED | OUTPATIENT
Start: 2018-06-19 | End: 2018-06-29

## 2018-06-19 RX ORDER — LIDOCAINE HYDROCHLORIDE 10 MG/ML
0.5 INJECTION, SOLUTION EPIDURAL; INFILTRATION; INTRACAUDAL; PERINEURAL ONCE
Status: DISCONTINUED | OUTPATIENT
Start: 2018-06-19 | End: 2018-06-20 | Stop reason: HOSPADM

## 2018-06-19 RX ORDER — HYDROCODONE BITARTRATE AND ACETAMINOPHEN 5; 325 MG/1; MG/1
1 TABLET ORAL EVERY 6 HOURS PRN
Qty: 10 TABLET | Refills: 0 | Status: SHIPPED | OUTPATIENT
Start: 2018-06-19 | End: 2018-06-24

## 2018-06-19 RX ORDER — SODIUM CHLORIDE 9 MG/ML
INJECTION, SOLUTION INTRAVENOUS CONTINUOUS
Status: DISCONTINUED | OUTPATIENT
Start: 2018-06-19 | End: 2018-06-20 | Stop reason: HOSPADM

## 2018-06-19 RX ADMIN — CIPROFLOXACIN 400 MG: 2 INJECTION, SOLUTION INTRAVENOUS at 12:37

## 2018-06-19 RX ADMIN — SODIUM CHLORIDE: 9 INJECTION, SOLUTION INTRAVENOUS at 12:13

## 2018-06-19 ASSESSMENT — PAIN - FUNCTIONAL ASSESSMENT: PAIN_FUNCTIONAL_ASSESSMENT: 0-10

## 2018-06-26 ENCOUNTER — OFFICE VISIT (OUTPATIENT)
Dept: SURGERY | Age: 58
End: 2018-06-26

## 2018-06-26 VITALS — SYSTOLIC BLOOD PRESSURE: 128 MMHG | WEIGHT: 214 LBS | DIASTOLIC BLOOD PRESSURE: 76 MMHG | BODY MASS INDEX: 31.6 KG/M2

## 2018-06-26 DIAGNOSIS — C25.0 MALIGNANT NEOPLASM OF HEAD OF PANCREAS (HCC): Primary | ICD-10-CM

## 2018-06-26 PROCEDURE — 99024 POSTOP FOLLOW-UP VISIT: CPT | Performed by: SURGERY

## 2018-06-26 NOTE — PROGRESS NOTES
[facility] GENERAL AND LAPAROSCOPIC SURGERY          PATIENT NAME: Gerardo Culver     TODAY'S DATE: 6/26/2018    SUBJECTIVE:    Pt without concerns.      OBJECTIVE:  VITALS:  /76   Wt 214 lb (97.1 kg)   BMI 31.60 kg/m²     CONSTITUTIONAL:  awake and alert  LUNGS:  clear to auscultation  ABDOMEN:  normal bowel sounds, soft, non-distended, non-tender, incision healed     Data:    Radiology Review:  None      ASSESSMENT AND PLAN:  S/P Whipple  Doing fine, leave biliary stent in position  Diet as elina  Adjuvant tx    Wiley Malone

## 2018-07-07 RX ORDER — ALLOPURINOL 300 MG/1
TABLET ORAL
Qty: 90 TABLET | Refills: 0 | Status: SHIPPED | OUTPATIENT
Start: 2018-07-07 | End: 2018-10-22 | Stop reason: SDUPTHER

## 2018-07-31 ENCOUNTER — OFFICE VISIT (OUTPATIENT)
Dept: SURGERY | Age: 58
End: 2018-07-31

## 2018-07-31 VITALS — BODY MASS INDEX: 30.86 KG/M2 | SYSTOLIC BLOOD PRESSURE: 126 MMHG | DIASTOLIC BLOOD PRESSURE: 82 MMHG | WEIGHT: 209 LBS

## 2018-07-31 DIAGNOSIS — C25.0 MALIGNANT NEOPLASM OF HEAD OF PANCREAS (HCC): Primary | ICD-10-CM

## 2018-07-31 PROCEDURE — 99024 POSTOP FOLLOW-UP VISIT: CPT | Performed by: SURGERY

## 2018-07-31 NOTE — PROGRESS NOTES
[facility] GENERAL AND LAPAROSCOPIC SURGERY          PATIENT NAME: Kristin Meyers     TODAY'S DATE: 7/31/2018    SUBJECTIVE:    Pt feeling well, no concerns, elina chemo reasonably well. Stable weight, no ab pain, no N/V.     OBJECTIVE:  VITALS:  /82   Wt 209 lb (94.8 kg)   BMI 30.86 kg/m²     CONSTITUTIONAL:  awake and alert  LUNGS:  clear to auscultation  ABDOMEN:  normal bowel sounds, soft, non-distended, non-tender, incision healed, tube on right stable     Data:  CBC: No results for input(s): WBC, HGB, HCT, PLT in the last 72 hours. BMP:  No results for input(s): NA, K, CL, CO2, BUN, CREATININE, GLUCOSE in the last 72 hours. Hepatic: No results for input(s): AST, ALT, ALB, BILITOT, ALKPHOS in the last 72 hours. Mag:    No results for input(s): MG in the last 72 hours. Phos:   No results for input(s): PHOS in the last 72 hours. INR: No results for input(s): INR in the last 72 hours.     Radiology Review:  None      ASSESSMENT AND PLAN:  S/P Whipple  Stable, no issues  See back in a month  Remove hepaticojejunostomy stent then    Gisella Thomas

## 2018-08-28 ENCOUNTER — OFFICE VISIT (OUTPATIENT)
Dept: SURGERY | Age: 58
End: 2018-08-28

## 2018-08-28 VITALS — WEIGHT: 203.8 LBS | SYSTOLIC BLOOD PRESSURE: 110 MMHG | BODY MASS INDEX: 30.1 KG/M2 | DIASTOLIC BLOOD PRESSURE: 62 MMHG

## 2018-08-28 DIAGNOSIS — C25.0 MALIGNANT NEOPLASM OF HEAD OF PANCREAS (HCC): Primary | ICD-10-CM

## 2018-08-28 PROBLEM — R50.9 FEVER: Status: ACTIVE | Noted: 2018-08-28

## 2018-08-28 PROCEDURE — 99212 OFFICE O/P EST SF 10 MIN: CPT | Performed by: SURGERY

## 2018-08-28 NOTE — PROGRESS NOTES
[facility] GENERAL AND LAPAROSCOPIC SURGERY          PATIENT NAME: Arvind Mclaughlin     TODAY'S DATE: 8/28/2018    SUBJECTIVE:    Pt feeling well, concerned about his father.      OBJECTIVE:  VITALS:  /62   Wt 203 lb 12.8 oz (92.4 kg)   BMI 30.10 kg/m²     CONSTITUTIONAL:  awake and alert  LUNGS:  clear to auscultation  ABDOMEN:  normal bowel sounds, soft, non-distended, non-tender, right later stent in place     Radiology Review:  None      ASSESSMENT AND PLAN:  S/P Whipple - panc ca  Remove biliary stent today - now 6 mos so scar / remodeling around small anastomosis should be settled down with lower risk of stricture  See back in 3 mos, sooner prn   Continuing chemo      Nancy Aparicio

## 2018-08-29 PROBLEM — A41.9 SEPSIS (HCC): Status: RESOLVED | Noted: 2018-05-20 | Resolved: 2018-08-29

## 2018-08-29 PROBLEM — T45.1X5A CHEMOTHERAPY INDUCED DIARRHEA: Status: ACTIVE | Noted: 2018-08-29

## 2018-08-29 PROBLEM — D61.810 PANCYTOPENIA DUE TO CHEMOTHERAPY (HCC): Status: ACTIVE | Noted: 2018-08-29

## 2018-08-29 PROBLEM — K52.1 CHEMOTHERAPY INDUCED DIARRHEA: Status: ACTIVE | Noted: 2018-08-29

## 2018-08-30 ENCOUNTER — OFFICE VISIT (OUTPATIENT)
Dept: SURGERY | Age: 58
End: 2018-08-30

## 2018-08-30 ENCOUNTER — TELEPHONE (OUTPATIENT)
Dept: FAMILY MEDICINE CLINIC | Age: 58
End: 2018-08-30

## 2018-08-30 VITALS — WEIGHT: 214 LBS | DIASTOLIC BLOOD PRESSURE: 72 MMHG | BODY MASS INDEX: 31.6 KG/M2 | SYSTOLIC BLOOD PRESSURE: 109 MMHG

## 2018-08-30 DIAGNOSIS — C25.0 MALIGNANT NEOPLASM OF HEAD OF PANCREAS (HCC): Primary | ICD-10-CM

## 2018-08-30 PROCEDURE — 99213 OFFICE O/P EST LOW 20 MIN: CPT | Performed by: SURGERY

## 2018-08-30 NOTE — PROGRESS NOTES
[facility] GENERAL AND LAPAROSCOPIC SURGERY          PATIENT NAME: Gerardo Culver     TODAY'S DATE: 8/30/2018    SUBJECTIVE:    Pt with panc ca, had drain removed, bacteremic, fever and tacky better, has pain remaining on the right flank. Eating and drinking, normal BM. OBJECTIVE:  VITALS:  /72   Wt 214 lb (97.1 kg)   BMI 31.60 kg/m²     CONSTITUTIONAL:  awake and alert  LUNGS:  clear to auscultation  ABDOMEN:  normal bowel sounds, soft, non-distended, tenderness noted in the right upper quadrant / flank, old drain area     Data:  CBC:   Recent Labs      08/28/18 1950 08/29/18   0610   WBC  3.1*  4.5   HGB  10.5*  9.5*   HCT  30.9*  28.3*   PLT  98*  88*     BMP:    Recent Labs      08/28/18 1950 08/29/18   0610   NA  134*  135*   K  3.5  3.8   CL  95*  99   CO2  27  26   BUN  15  12   CREATININE  0.9  1.0   GLUCOSE  185*  216*     Hepatic:   Recent Labs      08/28/18 1950 08/29/18   0610   AST  16  12*   ALT  15  12   BILITOT  0.8  0.7   ALKPHOS  137*  109     Mag:    No results for input(s): MG in the last 72 hours. Phos:   No results for input(s): PHOS in the last 72 hours.    INR:   Recent Labs      08/28/18 1951   INR  1.28*       Radiology Review:  CT - OK post drain removal      ASSESSMENT AND PLAN:  Panc ca  S/P whipple  S/P hepaticoj stent removal  RUQ pain  Post drain peritoneal irritation, ow stable, use pain meds prn, finish atbx, feroz next week, feroz labs tomorrow    Ilean Mary

## 2018-09-04 ENCOUNTER — HOSPITAL ENCOUNTER (OUTPATIENT)
Dept: OTHER | Age: 58
Discharge: HOME | End: 2018-09-04
Attending: SURGERY | Admitting: SURGERY

## 2018-09-04 DIAGNOSIS — C25.0 MALIGNANT NEOPLASM OF HEAD OF PANCREAS (HCC): ICD-10-CM

## 2018-09-04 LAB
A/G RATIO: 1.2 (ref 1.1–2.2)
ALBUMIN SERPL-MCNC: 3.2 G/DL (ref 3.4–5)
ALP BLD-CCNC: 128 U/L (ref 40–129)
ALT SERPL-CCNC: 16 U/L (ref 10–40)
ANION GAP SERPL CALCULATED.3IONS-SCNC: 9 MMOL/L (ref 3–16)
AST SERPL-CCNC: 23 U/L (ref 15–37)
BASOPHILS ABSOLUTE: 0.1 K/UL (ref 0–0.2)
BASOPHILS RELATIVE PERCENT: 0.9 %
BILIRUB SERPL-MCNC: 0.3 MG/DL (ref 0–1)
BUN BLDV-MCNC: 9 MG/DL (ref 7–20)
CALCIUM SERPL-MCNC: 8.5 MG/DL (ref 8.3–10.6)
CHLORIDE BLD-SCNC: 101 MMOL/L (ref 99–110)
CO2: 28 MMOL/L (ref 21–32)
CREAT SERPL-MCNC: 0.9 MG/DL (ref 0.9–1.3)
EOSINOPHILS ABSOLUTE: 0.3 K/UL (ref 0–0.6)
EOSINOPHILS RELATIVE PERCENT: 5.2 %
GFR AFRICAN AMERICAN: >60
GFR NON-AFRICAN AMERICAN: >60
GLOBULIN: 2.7 G/DL
GLUCOSE BLD-MCNC: 226 MG/DL (ref 70–99)
HCT VFR BLD CALC: 30.9 % (ref 40.5–52.5)
HEMOGLOBIN: 10.1 G/DL (ref 13.5–17.5)
LIPASE: 13 U/L (ref 13–60)
LYMPHOCYTES ABSOLUTE: 1 K/UL (ref 1–5.1)
LYMPHOCYTES RELATIVE PERCENT: 17.4 %
MCH RBC QN AUTO: 28.4 PG (ref 26–34)
MCHC RBC AUTO-ENTMCNC: 32.8 G/DL (ref 31–36)
MCV RBC AUTO: 86.3 FL (ref 80–100)
MONOCYTES ABSOLUTE: 0.8 K/UL (ref 0–1.3)
MONOCYTES RELATIVE PERCENT: 14.6 %
NEUTROPHILS ABSOLUTE: 3.4 K/UL (ref 1.7–7.7)
NEUTROPHILS RELATIVE PERCENT: 61.9 %
PDW BLD-RTO: 20.7 % (ref 12.4–15.4)
PLATELET # BLD: 290 K/UL (ref 135–450)
PMV BLD AUTO: 6.8 FL (ref 5–10.5)
POTASSIUM SERPL-SCNC: 3.8 MMOL/L (ref 3.5–5.1)
RBC # BLD: 3.57 M/UL (ref 4.2–5.9)
SODIUM BLD-SCNC: 138 MMOL/L (ref 136–145)
TOTAL PROTEIN: 5.9 G/DL (ref 6.4–8.2)
WBC # BLD: 5.5 K/UL (ref 4–11)

## 2018-09-10 ENCOUNTER — OFFICE VISIT (OUTPATIENT)
Dept: FAMILY MEDICINE CLINIC | Age: 58
End: 2018-09-10

## 2018-09-10 ENCOUNTER — TELEPHONE (OUTPATIENT)
Dept: FAMILY MEDICINE CLINIC | Age: 58
End: 2018-09-10

## 2018-09-10 VITALS
SYSTOLIC BLOOD PRESSURE: 104 MMHG | DIASTOLIC BLOOD PRESSURE: 74 MMHG | HEART RATE: 103 BPM | BODY MASS INDEX: 30.83 KG/M2 | OXYGEN SATURATION: 97 % | WEIGHT: 208.8 LBS

## 2018-09-10 DIAGNOSIS — I10 ESSENTIAL HYPERTENSION: ICD-10-CM

## 2018-09-10 DIAGNOSIS — A41.9 SEPTICEMIA (HCC): ICD-10-CM

## 2018-09-10 DIAGNOSIS — E11.65 UNCONTROLLED TYPE 2 DIABETES MELLITUS WITH HYPERGLYCEMIA, WITHOUT LONG-TERM CURRENT USE OF INSULIN (HCC): Primary | ICD-10-CM

## 2018-09-10 DIAGNOSIS — C25.0 MALIGNANT NEOPLASM OF HEAD OF PANCREAS (HCC): ICD-10-CM

## 2018-09-10 PROBLEM — R63.4 WEIGHT LOSS: Status: RESOLVED | Noted: 2018-02-12 | Resolved: 2018-09-10

## 2018-09-10 PROBLEM — E43 SEVERE MALNUTRITION (HCC): Chronic | Status: RESOLVED | Noted: 2018-02-21 | Resolved: 2018-09-10

## 2018-09-10 PROBLEM — R50.9 FEVER: Status: RESOLVED | Noted: 2018-08-28 | Resolved: 2018-09-10

## 2018-09-10 PROCEDURE — 99495 TRANSJ CARE MGMT MOD F2F 14D: CPT | Performed by: FAMILY MEDICINE

## 2018-09-10 NOTE — PROGRESS NOTES
Physical Exam   Constitutional: He appears well-developed and well-nourished. He is cooperative. No distress. Neck: Carotid bruit is not present. Cardiovascular: Normal rate, regular rhythm and normal heart sounds. No murmur heard. Pulses:       Dorsalis pedis pulses are 2+ on the right side, and 2+ on the left side. Posterior tibial pulses are 2+ on the right side, and 2+ on the left side. Pulmonary/Chest: Effort normal and breath sounds normal.   Abdominal: Soft. Normal appearance and bowel sounds are normal. He exhibits no distension and no mass. There is no hepatosplenomegaly. There is no tenderness. There is no rigidity, no rebound, no guarding and no CVA tenderness. No hernia. Postop surgical scars are well-healed   Musculoskeletal: Normal range of motion. He exhibits no edema or tenderness. Neurological: He is alert. He has normal strength. No sensory deficit. Assessment:      Ifeoma Solis was seen today for follow-up from hospital.    Diagnoses and all orders for this visit:    Uncontrolled type 2 diabetes mellitus with hyperglycemia, without long-term current use of insulin ([address]    Malignant neoplasm of head of pancreas ([address]    Septicemia ([address]    Essential hypertension    Other orders  -     insulin glargine (LANTUS) 100 UNIT/ML injection pen; Inject 16 Units into the skin every morning  -     insulin aspart (NOVOLOG FLEXPEN) 100 UNIT/ML injection pen; Inject 8 Units into the skin 3 times daily (before meals) 140-199- 2 units; 200-249- 3 units; 250-299-4 units; 300+- 6units    OARRS report checked          Plan:      Hospital information and oncology notes reviewed with patient. Adjustment of insulin as noted above although this may be difficult to obtain optimal diabetic management with chemotherapy weekly. Septicemia is resolved and no further signs of infection  Agree with current treatment plan regards to cancer  Medical decision making of moderate complexity.     RTC 3

## 2018-09-11 ENCOUNTER — OFFICE VISIT (OUTPATIENT)
Dept: SURGERY | Age: 58
End: 2018-09-11

## 2018-09-11 VITALS — DIASTOLIC BLOOD PRESSURE: 80 MMHG | WEIGHT: 204 LBS | SYSTOLIC BLOOD PRESSURE: 100 MMHG | BODY MASS INDEX: 30.13 KG/M2

## 2018-09-11 DIAGNOSIS — C25.0 MALIGNANT NEOPLASM OF HEAD OF PANCREAS (HCC): Primary | ICD-10-CM

## 2018-09-11 PROCEDURE — 99212 OFFICE O/P EST SF 10 MIN: CPT | Performed by: SURGERY

## 2018-09-11 NOTE — PROGRESS NOTES
[facility] GENERAL AND LAPAROSCOPIC SURGERY          PATIENT NAME: Mary Costa     TODAY'S DATE: 9/11/2018    SUBJECTIVE:  Panc ca  Pt feeling better, prior RUQ pain following stent removal has resolved. Has had good appetite, no F/C. Jackeline Arriaga OBJECTIVE:  VITALS:  /80   Wt 204 lb (92.5 kg)   BMI 30.13 kg/m²     CONSTITUTIONAL:  awake and alert  LUNGS:  clear to auscultation  ABDOMEN:  normal bowel sounds, soft, non-distended, non-tender, incision healed, no mass     Data:       Ref.  Range 9/4/2018 13:42   Sodium Latest Ref Range: 136 - 145 mmol/L 138   Potassium Latest Ref Range: 3.5 - 5.1 mmol/L 3.8   Chloride Latest Ref Range: 99 - 110 mmol/L 101   CO2 Latest Ref Range: 21 - 32 mmol/L 28   BUN Latest Ref Range: 7 - 20 mg/dL 9   Creatinine Latest Ref Range: 0.9 - 1.3 mg/dL 0.9   Anion Gap Latest Ref Range: 3 - 16  9   GFR Non- Latest Ref Range: >60  >60   GFR  Latest Ref Range: >60  >60   Glucose Latest Ref Range: 70 - 99 mg/dL 226 (H)   Calcium Latest Ref Range: 8.3 - 10.6 mg/dL 8.5   Total Protein Latest Ref Range: 6.4 - 8.2 g/dL 5.9 (L)   Albumin Latest Ref Range: 3.4 - 5.0 g/dL 3.2 (L)   Globulin Latest Units: g/dL 2.7   Albumin/Globulin Ratio Latest Ref Range: 1.1 - 2.2  1.2   Alk Phos Latest Ref Range: 40 - 129 U/L 128   ALT Latest Ref Range: 10 - 40 U/L 16   AST Latest Ref Range: 15 - 37 U/L 23   Bilirubin Latest Ref Range: 0.0 - 1.0 mg/dL 0.3   Lipase Latest Ref Range: 13.0 - 60.0 U/L 13.0   WBC Latest Ref Range: 4.0 - 11.0 K/uL 5.5   RBC Latest Ref Range: 4.20 - 5.90 M/uL 3.57 (L)   Hemoglobin Quant Latest Ref Range: 13.5 - 17.5 g/dL 10.1 (L)   Hematocrit Latest Ref Range: 40.5 - 52.5 % 30.9 (L)   MCV Latest Ref Range: 80.0 - 100.0 fL 86.3   MCH Latest Ref Range: 26.0 - 34.0 pg 28.4   MCHC Latest Ref Range: 31.0 - 36.0 g/dL 32.8   MPV Latest Ref Range: 5.0 - 10.5 fL 6.8   RDW Latest Ref Range: 12.4 - 15.4 % 20.7 (H)   Platelet Count Latest Ref Range:

## 2018-09-18 RX ORDER — FUROSEMIDE 20 MG/1
TABLET ORAL
Qty: 90 TABLET | Refills: 0 | Status: SHIPPED | OUTPATIENT
Start: 2018-09-18 | End: 2018-12-13 | Stop reason: SDUPTHER

## 2018-10-18 ENCOUNTER — APPOINTMENT (OUTPATIENT)
Dept: CT IMAGING | Age: 58
End: 2018-10-18
Payer: COMMERCIAL

## 2018-10-18 ENCOUNTER — HOSPITAL ENCOUNTER (EMERGENCY)
Age: 58
Discharge: HOME | End: 2018-10-18
Attending: EMERGENCY MEDICINE
Payer: COMMERCIAL

## 2018-10-18 VITALS
SYSTOLIC BLOOD PRESSURE: 144 MMHG | TEMPERATURE: 98 F | HEART RATE: 95 BPM | RESPIRATION RATE: 18 BRPM | DIASTOLIC BLOOD PRESSURE: 90 MMHG | OXYGEN SATURATION: 98 %

## 2018-10-18 DIAGNOSIS — R31.9 URINARY TRACT INFECTION WITH HEMATURIA, SITE UNSPECIFIED: Primary | ICD-10-CM

## 2018-10-18 DIAGNOSIS — N23 RENAL COLIC: ICD-10-CM

## 2018-10-18 DIAGNOSIS — N39.0 URINARY TRACT INFECTION WITH HEMATURIA, SITE UNSPECIFIED: Primary | ICD-10-CM

## 2018-10-18 DIAGNOSIS — R10.9 FLANK PAIN: ICD-10-CM

## 2018-10-18 LAB
ANION GAP SERPL CALCULATED.3IONS-SCNC: 11 MMOL/L (ref 3–16)
BASOPHILS ABSOLUTE: 0 K/UL (ref 0–0.2)
BASOPHILS RELATIVE PERCENT: 0.7 %
BILIRUBIN URINE: NEGATIVE
BLOOD, URINE: ABNORMAL
BUN BLDV-MCNC: 13 MG/DL (ref 7–20)
CALCIUM SERPL-MCNC: 8.8 MG/DL (ref 8.3–10.6)
CHLORIDE BLD-SCNC: 97 MMOL/L (ref 99–110)
CLARITY: CLEAR
CO2: 25 MMOL/L (ref 21–32)
COLOR: YELLOW
CREAT SERPL-MCNC: 1.1 MG/DL (ref 0.9–1.3)
EOSINOPHILS ABSOLUTE: 0 K/UL (ref 0–0.6)
EOSINOPHILS RELATIVE PERCENT: 0.2 %
EPITHELIAL CELLS, UA: 0 /HPF (ref 0–5)
GFR AFRICAN AMERICAN: >60
GFR NON-AFRICAN AMERICAN: >60
GLUCOSE BLD-MCNC: 320 MG/DL (ref 70–99)
GLUCOSE URINE: 500 MG/DL
HCT VFR BLD CALC: 28.3 % (ref 40.5–52.5)
HEMOGLOBIN: 9.3 G/DL (ref 13.5–17.5)
HYALINE CASTS: 3 /LPF (ref 0–8)
KETONES, URINE: NEGATIVE MG/DL
LEUKOCYTE ESTERASE, URINE: ABNORMAL
LYMPHOCYTES ABSOLUTE: 0.2 K/UL (ref 1–5.1)
LYMPHOCYTES RELATIVE PERCENT: 8.3 %
MCH RBC QN AUTO: 30.6 PG (ref 26–34)
MCHC RBC AUTO-ENTMCNC: 32.9 G/DL (ref 31–36)
MCV RBC AUTO: 93 FL (ref 80–100)
MICROSCOPIC EXAMINATION: YES
MONOCYTES ABSOLUTE: 0.1 K/UL (ref 0–1.3)
MONOCYTES RELATIVE PERCENT: 2.6 %
NEUTROPHILS ABSOLUTE: 2.3 K/UL (ref 1.7–7.7)
NEUTROPHILS RELATIVE PERCENT: 88.2 %
NITRITE, URINE: NEGATIVE
PDW BLD-RTO: 24.5 % (ref 12.4–15.4)
PH UA: 5
PLATELET # BLD: 97 K/UL (ref 135–450)
PMV BLD AUTO: 7.4 FL (ref 5–10.5)
POTASSIUM REFLEX MAGNESIUM: 4.5 MMOL/L (ref 3.5–5.1)
PROTEIN UA: NEGATIVE MG/DL
RBC # BLD: 3.05 M/UL (ref 4.2–5.9)
RBC UA: 10 /HPF (ref 0–4)
SODIUM BLD-SCNC: 133 MMOL/L (ref 136–145)
SPECIFIC GRAVITY UA: 1.02
URINE TYPE: ABNORMAL
UROBILINOGEN, URINE: 0.2 E.U./DL
WBC # BLD: 2.6 K/UL (ref 4–11)
WBC UA: 55 /HPF (ref 0–5)

## 2018-10-18 PROCEDURE — 80048 BASIC METABOLIC PNL TOTAL CA: CPT

## 2018-10-18 PROCEDURE — 85025 COMPLETE CBC W/AUTO DIFF WBC: CPT

## 2018-10-18 PROCEDURE — 74176 CT ABD & PELVIS W/O CONTRAST: CPT

## 2018-10-18 PROCEDURE — 81001 URINALYSIS AUTO W/SCOPE: CPT

## 2018-10-18 PROCEDURE — 99284 EMERGENCY DEPT VISIT MOD MDM: CPT

## 2018-10-18 RX ORDER — HYDROCODONE BITARTRATE AND ACETAMINOPHEN 5; 325 MG/1; MG/1
1 TABLET ORAL EVERY 6 HOURS PRN
Qty: 10 TABLET | Refills: 0 | Status: SHIPPED | OUTPATIENT
Start: 2018-10-18 | End: 2018-10-21

## 2018-10-18 RX ORDER — KETOROLAC TROMETHAMINE 30 MG/ML
15 INJECTION, SOLUTION INTRAMUSCULAR; INTRAVENOUS ONCE
Status: DISCONTINUED | OUTPATIENT
Start: 2018-10-18 | End: 2018-10-18 | Stop reason: HOSPADM

## 2018-10-18 RX ORDER — CEFUROXIME AXETIL 500 MG/1
500 TABLET ORAL 2 TIMES DAILY
Qty: 20 TABLET | Refills: 0 | Status: SHIPPED | OUTPATIENT
Start: 2018-10-18 | End: 2018-10-28

## 2018-10-18 RX ORDER — HYDROCODONE BITARTRATE AND ACETAMINOPHEN 5; 325 MG/1; MG/1
1 TABLET ORAL EVERY 6 HOURS PRN
COMMUNITY
End: 2018-10-18

## 2018-10-18 ASSESSMENT — ENCOUNTER SYMPTOMS
EYES NEGATIVE: 1
GASTROINTESTINAL NEGATIVE: 1
RESPIRATORY NEGATIVE: 1

## 2018-10-18 ASSESSMENT — PAIN DESCRIPTION - PAIN TYPE: TYPE: ACUTE PAIN

## 2018-10-18 ASSESSMENT — PAIN SCALES - GENERAL: PAINLEVEL_OUTOF10: 7

## 2018-10-18 ASSESSMENT — PAIN DESCRIPTION - LOCATION: LOCATION: FLANK

## 2018-10-18 NOTE — ED PROVIDER NOTES
I independently performed a history and physical on [address] [place] [address]. All diagnostic, treatment, and disposition decisions were made by myself in conjunction with the advanced practice provider. Briefly, this is a 60 y.o. male here for right-sided flank pain and tenderness. Symptoms are improved from last night when he started. They currently mild to moderate. He is receiving chemotherapy    On exam, he is comfortable. On the right-sided flank tenderness. No respiratory distress        Screenings            MDM    Patient has no ureterolithiasis. He does have a stone in the bladder which she will probably pass. Antibiotics were given in the emergency department. Patient's pain was controlled. We will discharge with antibiotics and other medication for pain control. Can follow-up with family doctor and urology    Patient Referrals:  Anabelle Lawrence MD  [address] [facility] [address] [facility]  [address] [phone]    In 2 days        Discharge Medications:  New Prescriptions    CEFUROXIME (CEFTIN) 500 MG TABLET    Take 1 tablet by mouth 2 times daily for 10 days    HYDROCODONE-ACETAMINOPHEN (NORCO) 5-325 MG PER TABLET    Take 1 tablet by mouth every 6 hours as needed for Pain for up to 3 days. Intended supply: 3 days. Take lowest dose possible to manage pain. FINAL IMPRESSION  1. Urinary tract infection with hematuria, site unspecified    2. Flank pain    3. Renal colic        Blood pressure (!) 144/90, pulse 95, temperature 98 °F (36.7 °C), temperature source Oral, resp. rate 18, SpO2 98 %. For further details of [address] [place] [address] emergency department encounter, please see documentation by advanced practice provider.        Eva Nelson LOVELY, DO  10/18/18 [id number]

## 2018-10-18 NOTE — ED NOTES
Bed: 14  Expected date:   Expected time:   Means of arrival:   Comments:  Velma Eugene RN  10/18/18 [id number]

## 2018-10-22 RX ORDER — ALLOPURINOL 300 MG/1
TABLET ORAL
Qty: 90 TABLET | Refills: 1 | Status: SHIPPED | OUTPATIENT
Start: 2018-10-22 | End: 2019-04-08 | Stop reason: SDUPTHER

## 2018-11-08 ENCOUNTER — HOSPITAL ENCOUNTER (EMERGENCY)
Age: 58
Discharge: HOME | End: 2018-11-08
Payer: COMMERCIAL

## 2018-11-08 VITALS
OXYGEN SATURATION: 100 % | HEIGHT: 69 IN | HEART RATE: 82 BPM | RESPIRATION RATE: 17 BRPM | TEMPERATURE: 98.6 F | DIASTOLIC BLOOD PRESSURE: 94 MMHG | BODY MASS INDEX: 30.21 KG/M2 | WEIGHT: 204 LBS | SYSTOLIC BLOOD PRESSURE: 148 MMHG

## 2018-11-08 DIAGNOSIS — S61.209A AVULSION OF FINGERTIP, INITIAL ENCOUNTER: Primary | ICD-10-CM

## 2018-11-08 PROCEDURE — 99283 EMERGENCY DEPT VISIT LOW MDM: CPT

## 2018-11-08 PROCEDURE — 4500000022 HC ED LEVEL 2 PROCEDURE

## 2018-11-08 RX ORDER — BUPIVACAINE HYDROCHLORIDE 5 MG/ML
INJECTION, SOLUTION EPIDURAL; INTRACAUDAL
Status: DISCONTINUED
Start: 2018-11-08 | End: 2018-11-08 | Stop reason: HOSPADM

## 2018-11-08 RX ORDER — LIDOCAINE HYDROCHLORIDE 10 MG/ML
INJECTION, SOLUTION EPIDURAL; INFILTRATION; INTRACAUDAL; PERINEURAL
Status: DISCONTINUED
Start: 2018-11-08 | End: 2018-11-08 | Stop reason: HOSPADM

## 2018-11-08 NOTE — ED NOTES
Finger dressed with adaptik, 2x2, 3\" ino per Severo Echeverria CNP.      Yan BRAVO Willard  11/08/18 [id number]

## 2018-11-08 NOTE — ED PROVIDER NOTES
edema, tenderness or deformity. There is a 1 x 1 cm avulsion of the distal pad of the right ring finger. This does not involve the nail.  4 range of motion with flexion extension of the right fourth DIP, PIP, MCP. Capillary refill brisk. No current bleeding. Neurological: He is alert and oriented to person, place, and time. No cranial nerve deficit. Skin: Skin is warm and dry. No rash noted. He is not diaphoretic. No erythema. Psychiatric: He has a normal mood and affect. His behavior is normal.   Nursing note and vitals reviewed. MEDICAL DECISION MAKING    Vitals:    Vitals:    11/08/18 1352   BP: (!) 148/94   Pulse: 82   Resp: 17   Temp: 98.6 °F (37 °C)   TempSrc: Infrared   SpO2: 100%   Weight: 204 lb (92.5 kg)   Height: 5' 9\" (1.753 m)       LABS:Labs Reviewed - No data to display     Remainder of labs reviewed and werenegative at this time or not returned at the time of this note. RADIOLOGY:   Non-plain film images such as CT, Ultrasound and MRI are read by the radiologist. Viri Montana PA-C have directly visualized the radiologic plain film image(s) with the below findings:        Interpretation per the Radiologist below, if available at the time of thisnote:    No orders to display        Ct Abdomen Pelvis Wo Contrast Additional Contrast? None    Result Date: 10/18/2018  EXAMINATION: CT OF THE ABDOMEN AND PELVIS WITHOUT CONTRAST 10/18/2018 5:25 pm TECHNIQUE: CT of the abdomen and pelvis was performed without the administration of intravenous contrast. Multiplanar reformatted images are provided for review. Dose modulation, iterative reconstruction, and/or weight based adjustment of the mA/kV was utilized to reduce the radiation dose to as low as reasonably achievable.  COMPARISON: 08/28/2018, May 2018, March 2018 HISTORY: ORDERING SYSTEM PROVIDED HISTORY: flank pain TECHNOLOGIST PROVIDED HISTORY: If patient is on cardiac monitor and/or pulse ox, they may be taken off cardiac monitor and pulse ox, left on O2 if currently on. All monitors reattached when patient returns to room. Additional Contrast?->None Ordering Physician Provided Reason for Exam: flank pain Acuity: Unknown Type of Exam: Unknown History of pancreas surgery, Whipple, pancreatic cancer, diabetes FINDINGS: Lower Chest: Clear lung bases. Organs: Noncontrast images of the liver, spleen, remaining pancreas, adrenal glands, and left kidney show no acute abnormality. The downstream pancreas and gallbladder are surgically absent status post Whipple procedure. A stent is noted within the downstream pancreatic duct extending into pancreaticojejunostomy; a stent is also noted within the jejunal loops which extends to the hepatic jejunostomy. Bilateral scratch the multiple bilateral renal pelvic stones are noted measuring up to 5 mm on the right. There is right perinephric and right periureteral stranding as well as right hydronephrosis and right hydroureter with a 4 mm stone within in the right posterolateral urinary bladder lumen, likely at or medial to the right ureterovesical junction. GI/Bowel: Postsurgical changes of a Whipple procedure are evident. No dilated loops of bowel or definite bowel wall thickening. Normal appendix. Pelvis: The prostate gland is enlarged measuring 6.2 cm in transverse dimension. The urinary bladder is nondistended. Peritoneum/Retroperitoneum: The aorta is normal in caliber and course, showing calcifications. Bones/Soft Tissues: No acute bony abnormality. No free fluid or adenopathy. Right hydronephrosis and right hydroureter with 4 mm stone within the dependent right posterolateral urinary bladder lumen, likely at or medial to the right ureterovesical junction. Bilateral nephrolithiasis. Postsurgical changes of Whipple procedure. MEDICAL DECISION MAKING / ED COURSE:      PROCEDURES:   Procedures    Verbal consent was obtained from patient to perform laceration repair with Dermabond.   There is nothing that can be sutured. Digital block was performed by provider in triage the patient is already having good anesthesia of wound when I see him. Multiple small pieces of toilet paper was removed from the wound. I do not see any other foreign body. No tendon damage noted. There is no current bleeding. Wound was cleaned with chlorhexidine and normal saline. Dermabond was placed and there is good hemostasis of the wound. Patient handled the procedure well. Wound care will be provided by nursing staff once Dermabond is fully dry. Patient was given:  Medications   lidocaine PF 1 % injection (not administered)   bupivacaine (PF) (MARCAINE) 0.5 % injection (not administered)   topical skin adhesive stick (not administered)       Patient present with an avulsion of the distal tip of his right ring finger. He is up-to-date on his tetanus. There is no tendon laceration. There is no bony tenderness or deformity. I do not believe x-ray imaging is warranted. He is distally neurovascularly intact. There is good hemostasis of the wound. Patient understood not to use antibiotic ointment on this. He'll follow up with his primary care physician returning here for any worsening of symptoms or problems at home. The patient tolerated their visit well. I evaluated the patient. The physician was available for consultation as needed. The patient and / or the family were informed of the results of anytests, a time was given to answer questions, a plan was proposed and they agreed with plan. CLINICAL IMPRESSION:  1.  Avulsion of fingertip, initial encounter        DISPOSITION Decision To Discharge 11/08/2018 02:45:01 PM      PATIENT REFERRED TO:  Hilda Aleman MD  [address] [facility] [address] [facility]  [phone]    Schedule an appointment as soon as possible for a visit in 3 days  For re-check    [facility] Emergency Department  [place]

## 2018-11-26 ENCOUNTER — TELEPHONE (OUTPATIENT)
Dept: FAMILY MEDICINE CLINIC | Age: 58
End: 2018-11-26

## 2018-11-26 NOTE — TELEPHONE ENCOUNTER
Patient is stating that the pharmacy doesn't know that he is on  (  insulin glargine (LANTUS) 100 UNIT/ML injection pen 5 pen 3 9/10/2018     Sig - Route: Inject 16 Units into the skin every morning - Subcutaneous      ) the pharmacy states the notes they have his in on 12 units can they be informed he is on 16 units    Pharmacy:  [address] [place] [address], [address]  Rigo Dixon [phone] Marlee Peacock [phone]    Informed that provider is out of the office

## 2018-12-11 ENCOUNTER — TELEPHONE (OUTPATIENT)
Dept: FAMILY MEDICINE CLINIC | Age: 58
End: 2018-12-11

## 2018-12-13 RX ORDER — FUROSEMIDE 20 MG/1
TABLET ORAL
Qty: 90 TABLET | Refills: 0 | Status: SHIPPED | OUTPATIENT
Start: 2018-12-13 | End: 2019-03-08 | Stop reason: SDUPTHER

## 2018-12-17 ENCOUNTER — OFFICE VISIT (OUTPATIENT)
Dept: FAMILY MEDICINE CLINIC | Age: 58
End: 2018-12-17
Payer: COMMERCIAL

## 2018-12-17 VITALS
DIASTOLIC BLOOD PRESSURE: 84 MMHG | SYSTOLIC BLOOD PRESSURE: 130 MMHG | WEIGHT: 204 LBS | OXYGEN SATURATION: 99 % | HEART RATE: 76 BPM | BODY MASS INDEX: 30.13 KG/M2

## 2018-12-17 DIAGNOSIS — S39.012A STRAIN OF LUMBAR REGION, INITIAL ENCOUNTER: Primary | ICD-10-CM

## 2018-12-17 PROCEDURE — 99213 OFFICE O/P EST LOW 20 MIN: CPT | Performed by: FAMILY MEDICINE

## 2018-12-17 RX ORDER — IBUPROFEN 800 MG/1
800 TABLET ORAL
Qty: 90 TABLET | Refills: 0 | Status: SHIPPED | OUTPATIENT
Start: 2018-12-17 | End: 2019-01-09 | Stop reason: SDUPTHER

## 2018-12-17 RX ORDER — CYCLOBENZAPRINE HCL 5 MG
5 TABLET ORAL 3 TIMES DAILY PRN
Qty: 20 TABLET | Refills: 0 | Status: SHIPPED | OUTPATIENT
Start: 2018-12-17 | End: 2018-12-23 | Stop reason: SDUPTHER

## 2018-12-24 RX ORDER — CYCLOBENZAPRINE HCL 5 MG
TABLET ORAL
Qty: 20 TABLET | Refills: 0 | Status: SHIPPED | OUTPATIENT
Start: 2018-12-24 | End: 2019-02-12 | Stop reason: SDUPTHER

## 2018-12-27 ENCOUNTER — HOSPITAL ENCOUNTER (OUTPATIENT)
Dept: CT IMAGING | Age: 58
Discharge: HOME | End: 2018-12-27
Payer: COMMERCIAL

## 2018-12-27 ENCOUNTER — TELEPHONE (OUTPATIENT)
Dept: FAMILY MEDICINE CLINIC | Age: 58
End: 2018-12-27

## 2018-12-27 DIAGNOSIS — C25.9 PRIMARY PANCREATIC CANCER (HCC): ICD-10-CM

## 2018-12-27 PROCEDURE — 74177 CT ABD & PELVIS W/CONTRAST: CPT

## 2018-12-27 PROCEDURE — 6360000004 HC RX CONTRAST MEDICATION: Performed by: INTERNAL MEDICINE

## 2018-12-27 RX ORDER — LANCETS 33 GAUGE
EACH MISCELLANEOUS
Qty: 400 EACH | Refills: 1 | Status: SHIPPED | OUTPATIENT
Start: 2018-12-27 | End: 2020-04-29

## 2018-12-27 RX ADMIN — IOPAMIDOL 75 ML: 755 INJECTION, SOLUTION INTRAVENOUS at 09:54

## 2018-12-27 RX ADMIN — IOHEXOL 50 ML: 240 INJECTION, SOLUTION INTRATHECAL; INTRAVASCULAR; INTRAVENOUS; ORAL at 09:54

## 2019-01-07 ENCOUNTER — HOSPITAL ENCOUNTER (EMERGENCY)
Age: 59
Discharge: HOME | End: 2019-01-07
Payer: COMMERCIAL

## 2019-01-07 ENCOUNTER — TELEPHONE (OUTPATIENT)
Dept: FAMILY MEDICINE CLINIC | Age: 59
End: 2019-01-07

## 2019-01-07 ENCOUNTER — APPOINTMENT (OUTPATIENT)
Dept: GENERAL RADIOLOGY | Age: 59
End: 2019-01-07
Payer: COMMERCIAL

## 2019-01-07 VITALS
HEART RATE: 77 BPM | RESPIRATION RATE: 18 BRPM | SYSTOLIC BLOOD PRESSURE: 151 MMHG | DIASTOLIC BLOOD PRESSURE: 94 MMHG | OXYGEN SATURATION: 100 % | BODY MASS INDEX: 33.38 KG/M2 | HEIGHT: 69 IN | TEMPERATURE: 98.6 F | WEIGHT: 225.4 LBS

## 2019-01-07 DIAGNOSIS — R07.81 RIB PAIN ON LEFT SIDE: Primary | ICD-10-CM

## 2019-01-07 PROCEDURE — 6370000000 HC RX 637 (ALT 250 FOR IP): Performed by: PHYSICIAN ASSISTANT

## 2019-01-07 PROCEDURE — 71046 X-RAY EXAM CHEST 2 VIEWS: CPT

## 2019-01-07 PROCEDURE — 99283 EMERGENCY DEPT VISIT LOW MDM: CPT

## 2019-01-07 RX ORDER — LIDOCAINE 50 MG/G
1 PATCH TOPICAL DAILY
Qty: 30 PATCH | Refills: 0 | Status: SHIPPED | OUTPATIENT
Start: 2019-01-07 | End: 2020-01-01

## 2019-01-07 RX ORDER — HYDROCODONE BITARTRATE AND ACETAMINOPHEN 5; 325 MG/1; MG/1
1 TABLET ORAL EVERY 6 HOURS PRN
Qty: 7 TABLET | Refills: 0 | Status: SHIPPED | OUTPATIENT
Start: 2019-01-07 | End: 2019-01-14 | Stop reason: SDUPTHER

## 2019-01-07 RX ORDER — LIDOCAINE 4 G/G
1 PATCH TOPICAL ONCE
Status: DISCONTINUED | OUTPATIENT
Start: 2019-01-07 | End: 2019-01-07 | Stop reason: HOSPADM

## 2019-01-07 ASSESSMENT — PAIN SCALES - GENERAL: PAINLEVEL_OUTOF10: 5

## 2019-01-07 ASSESSMENT — ENCOUNTER SYMPTOMS
ABDOMINAL PAIN: 0
COLOR CHANGE: 0
ABDOMINAL DISTENTION: 0
BACK PAIN: 0
WHEEZING: 0
VOMITING: 0
STRIDOR: 0
ALLERGIC/IMMUNOLOGIC NEGATIVE: 1
DIARRHEA: 0
CONSTIPATION: 0
NAUSEA: 0
SHORTNESS OF BREATH: 0
COUGH: 0

## 2019-01-09 ENCOUNTER — OFFICE VISIT (OUTPATIENT)
Dept: FAMILY MEDICINE CLINIC | Age: 59
End: 2019-01-09
Payer: COMMERCIAL

## 2019-01-09 VITALS
WEIGHT: 228.13 LBS | SYSTOLIC BLOOD PRESSURE: 120 MMHG | DIASTOLIC BLOOD PRESSURE: 82 MMHG | OXYGEN SATURATION: 98 % | RESPIRATION RATE: 16 BRPM | BODY MASS INDEX: 33.69 KG/M2 | HEART RATE: 88 BPM

## 2019-01-09 DIAGNOSIS — Z79.4 TYPE 2 DIABETES MELLITUS WITH HYPERGLYCEMIA, WITH LONG-TERM CURRENT USE OF INSULIN (HCC): Primary | ICD-10-CM

## 2019-01-09 DIAGNOSIS — C25.0 MALIGNANT NEOPLASM OF HEAD OF PANCREAS (HCC): ICD-10-CM

## 2019-01-09 DIAGNOSIS — M94.0 COSTOCHONDRITIS: ICD-10-CM

## 2019-01-09 DIAGNOSIS — E11.65 TYPE 2 DIABETES MELLITUS WITH HYPERGLYCEMIA, WITH LONG-TERM CURRENT USE OF INSULIN (HCC): Primary | ICD-10-CM

## 2019-01-09 DIAGNOSIS — I10 ESSENTIAL HYPERTENSION: ICD-10-CM

## 2019-01-09 PROCEDURE — 2022F DILAT RTA XM EVC RTNOPTHY: CPT | Performed by: FAMILY MEDICINE

## 2019-01-09 PROCEDURE — G8417 CALC BMI ABV UP PARAM F/U: HCPCS | Performed by: FAMILY MEDICINE

## 2019-01-09 PROCEDURE — G8427 DOCREV CUR MEDS BY ELIG CLIN: HCPCS | Performed by: FAMILY MEDICINE

## 2019-01-09 PROCEDURE — 99214 OFFICE O/P EST MOD 30 MIN: CPT | Performed by: FAMILY MEDICINE

## 2019-01-09 PROCEDURE — 3046F HEMOGLOBIN A1C LEVEL >9.0%: CPT | Performed by: FAMILY MEDICINE

## 2019-01-09 PROCEDURE — 1036F TOBACCO NON-USER: CPT | Performed by: FAMILY MEDICINE

## 2019-01-09 PROCEDURE — 3017F COLORECTAL CA SCREEN DOC REV: CPT | Performed by: FAMILY MEDICINE

## 2019-01-09 PROCEDURE — G8484 FLU IMMUNIZE NO ADMIN: HCPCS | Performed by: FAMILY MEDICINE

## 2019-01-09 RX ORDER — IBUPROFEN 800 MG/1
800 TABLET ORAL
Qty: 90 TABLET | Refills: 0 | Status: SHIPPED | OUTPATIENT
Start: 2019-01-09 | End: 2019-02-01 | Stop reason: SDUPTHER

## 2019-01-09 ASSESSMENT — PATIENT HEALTH QUESTIONNAIRE - PHQ9
2. FEELING DOWN, DEPRESSED OR HOPELESS: 0
1. LITTLE INTEREST OR PLEASURE IN DOING THINGS: 0
SUM OF ALL RESPONSES TO PHQ9 QUESTIONS 1 & 2: 0
SUM OF ALL RESPONSES TO PHQ QUESTIONS 1-9: 0
SUM OF ALL RESPONSES TO PHQ QUESTIONS 1-9: 0

## 2019-01-14 DIAGNOSIS — R07.81 RIB PAIN ON LEFT SIDE: ICD-10-CM

## 2019-01-14 RX ORDER — HYDROCODONE BITARTRATE AND ACETAMINOPHEN 5; 325 MG/1; MG/1
1 TABLET ORAL EVERY 6 HOURS PRN
Qty: 7 TABLET | Refills: 0 | Status: SHIPPED | OUTPATIENT
Start: 2019-01-14 | End: 2019-01-17

## 2019-01-15 ENCOUNTER — TELEPHONE (OUTPATIENT)
Dept: FAMILY MEDICINE CLINIC | Age: 59
End: 2019-01-15

## 2019-01-15 ENCOUNTER — OFFICE VISIT (OUTPATIENT)
Dept: SURGERY | Age: 59
End: 2019-01-15
Payer: COMMERCIAL

## 2019-01-15 VITALS — DIASTOLIC BLOOD PRESSURE: 80 MMHG | WEIGHT: 228 LBS | BODY MASS INDEX: 33.67 KG/M2 | SYSTOLIC BLOOD PRESSURE: 126 MMHG

## 2019-01-15 DIAGNOSIS — C25.0 MALIGNANT NEOPLASM OF HEAD OF PANCREAS (HCC): Primary | ICD-10-CM

## 2019-01-15 DIAGNOSIS — Z79.4 TYPE 2 DIABETES MELLITUS WITH HYPERGLYCEMIA, WITH LONG-TERM CURRENT USE OF INSULIN (HCC): Primary | ICD-10-CM

## 2019-01-15 DIAGNOSIS — E11.65 TYPE 2 DIABETES MELLITUS WITH HYPERGLYCEMIA, WITH LONG-TERM CURRENT USE OF INSULIN (HCC): Primary | ICD-10-CM

## 2019-01-15 PROCEDURE — G8417 CALC BMI ABV UP PARAM F/U: HCPCS | Performed by: SURGERY

## 2019-01-15 PROCEDURE — G8484 FLU IMMUNIZE NO ADMIN: HCPCS | Performed by: SURGERY

## 2019-01-15 PROCEDURE — 3017F COLORECTAL CA SCREEN DOC REV: CPT | Performed by: SURGERY

## 2019-01-15 PROCEDURE — 1036F TOBACCO NON-USER: CPT | Performed by: SURGERY

## 2019-01-15 PROCEDURE — 99213 OFFICE O/P EST LOW 20 MIN: CPT | Performed by: SURGERY

## 2019-01-15 PROCEDURE — G8427 DOCREV CUR MEDS BY ELIG CLIN: HCPCS | Performed by: SURGERY

## 2019-01-19 ENCOUNTER — TELEPHONE (OUTPATIENT)
Dept: FAMILY MEDICINE CLINIC | Age: 59
End: 2019-01-19

## 2019-02-01 RX ORDER — IBUPROFEN 800 MG/1
800 TABLET ORAL
Qty: 270 TABLET | Refills: 0 | Status: SHIPPED | OUTPATIENT
Start: 2019-02-01 | End: 2019-06-10 | Stop reason: SDUPTHER

## 2019-02-13 RX ORDER — CYCLOBENZAPRINE HCL 5 MG
TABLET ORAL
Qty: 20 TABLET | Refills: 0 | Status: SHIPPED | OUTPATIENT
Start: 2019-02-13 | End: 2019-06-14 | Stop reason: SDUPTHER

## 2019-03-08 RX ORDER — FUROSEMIDE 20 MG/1
TABLET ORAL
Qty: 90 TABLET | Refills: 0 | Status: SHIPPED | OUTPATIENT
Start: 2019-03-08 | End: 2019-05-27 | Stop reason: SDUPTHER

## 2019-03-25 ENCOUNTER — HOSPITAL ENCOUNTER (OUTPATIENT)
Dept: CT IMAGING | Age: 59
Discharge: HOME | End: 2019-03-25
Payer: COMMERCIAL

## 2019-03-25 DIAGNOSIS — C25.9 PRIMARY PANCREATIC CANCER (HCC): ICD-10-CM

## 2019-03-25 PROCEDURE — 6360000004 HC RX CONTRAST MEDICATION: Performed by: INTERNAL MEDICINE

## 2019-03-25 PROCEDURE — 74177 CT ABD & PELVIS W/CONTRAST: CPT

## 2019-03-25 RX ADMIN — IOHEXOL 50 ML: 240 INJECTION, SOLUTION INTRATHECAL; INTRAVASCULAR; INTRAVENOUS; ORAL at 14:15

## 2019-03-25 RX ADMIN — IOPAMIDOL 75 ML: 755 INJECTION, SOLUTION INTRAVENOUS at 14:15

## 2019-04-08 RX ORDER — ALLOPURINOL 300 MG/1
TABLET ORAL
Qty: 90 TABLET | Refills: 0 | Status: SHIPPED | OUTPATIENT
Start: 2019-04-08 | End: 2019-06-28 | Stop reason: SDUPTHER

## 2019-05-06 ENCOUNTER — OFFICE VISIT (OUTPATIENT)
Dept: FAMILY MEDICINE CLINIC | Age: 59
End: 2019-05-06
Payer: COMMERCIAL

## 2019-05-06 VITALS
DIASTOLIC BLOOD PRESSURE: 80 MMHG | OXYGEN SATURATION: 97 % | BODY MASS INDEX: 32.4 KG/M2 | WEIGHT: 219.4 LBS | SYSTOLIC BLOOD PRESSURE: 130 MMHG | HEART RATE: 85 BPM

## 2019-05-06 DIAGNOSIS — I10 ESSENTIAL HYPERTENSION: ICD-10-CM

## 2019-05-06 DIAGNOSIS — E11.65 TYPE 2 DIABETES MELLITUS WITH HYPERGLYCEMIA, WITH LONG-TERM CURRENT USE OF INSULIN (HCC): Primary | ICD-10-CM

## 2019-05-06 DIAGNOSIS — D61.810 PANCYTOPENIA DUE TO CHEMOTHERAPY (HCC): ICD-10-CM

## 2019-05-06 DIAGNOSIS — F32.9 REACTIVE DEPRESSION: ICD-10-CM

## 2019-05-06 DIAGNOSIS — Z79.4 TYPE 2 DIABETES MELLITUS WITH HYPERGLYCEMIA, WITH LONG-TERM CURRENT USE OF INSULIN (HCC): Primary | ICD-10-CM

## 2019-05-06 DIAGNOSIS — C25.0 MALIGNANT NEOPLASM OF HEAD OF PANCREAS (HCC): ICD-10-CM

## 2019-05-06 DIAGNOSIS — N40.1 BENIGN PROSTATIC HYPERPLASIA WITH LOWER URINARY TRACT SYMPTOMS, SYMPTOM DETAILS UNSPECIFIED: ICD-10-CM

## 2019-05-06 PROBLEM — K52.1 CHEMOTHERAPY INDUCED DIARRHEA: Status: RESOLVED | Noted: 2018-08-29 | Resolved: 2019-05-06

## 2019-05-06 PROBLEM — T45.1X5A CHEMOTHERAPY INDUCED DIARRHEA: Status: RESOLVED | Noted: 2018-08-29 | Resolved: 2019-05-06

## 2019-05-06 PROCEDURE — 99214 OFFICE O/P EST MOD 30 MIN: CPT | Performed by: FAMILY MEDICINE

## 2019-05-06 PROCEDURE — G8417 CALC BMI ABV UP PARAM F/U: HCPCS | Performed by: FAMILY MEDICINE

## 2019-05-06 PROCEDURE — 3017F COLORECTAL CA SCREEN DOC REV: CPT | Performed by: FAMILY MEDICINE

## 2019-05-06 PROCEDURE — 2022F DILAT RTA XM EVC RTNOPTHY: CPT | Performed by: FAMILY MEDICINE

## 2019-05-06 PROCEDURE — 3046F HEMOGLOBIN A1C LEVEL >9.0%: CPT | Performed by: FAMILY MEDICINE

## 2019-05-06 PROCEDURE — 1036F TOBACCO NON-USER: CPT | Performed by: FAMILY MEDICINE

## 2019-05-06 PROCEDURE — G8427 DOCREV CUR MEDS BY ELIG CLIN: HCPCS | Performed by: FAMILY MEDICINE
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
## 2019-05-14 ENCOUNTER — HOSPITAL ENCOUNTER (OUTPATIENT)
Age: 59
Discharge: HOME | End: 2019-05-14
Payer: COMMERCIAL

## 2019-05-14 DIAGNOSIS — E11.65 TYPE 2 DIABETES MELLITUS WITH HYPERGLYCEMIA, WITH LONG-TERM CURRENT USE OF INSULIN (HCC): ICD-10-CM

## 2019-05-14 DIAGNOSIS — Z79.4 TYPE 2 DIABETES MELLITUS WITH HYPERGLYCEMIA, WITH LONG-TERM CURRENT USE OF INSULIN (HCC): ICD-10-CM

## 2019-05-14 PROCEDURE — 83036 HEMOGLOBIN GLYCOSYLATED A1C: CPT

## 2019-05-14 PROCEDURE — 36415 COLL VENOUS BLD VENIPUNCTURE: CPT

## 2019-05-15 LAB
ESTIMATED AVERAGE GLUCOSE: 194.4 MG/DL
HBA1C MFR BLD: 8.4 %

## 2019-05-16 DIAGNOSIS — E11.65 TYPE 2 DIABETES MELLITUS WITH HYPERGLYCEMIA, WITH LONG-TERM CURRENT USE OF INSULIN (HCC): Primary | ICD-10-CM

## 2019-05-16 DIAGNOSIS — Z79.4 TYPE 2 DIABETES MELLITUS WITH HYPERGLYCEMIA, WITH LONG-TERM CURRENT USE OF INSULIN (HCC): Primary | ICD-10-CM

## 2019-05-16 NOTE — TELEPHONE ENCOUNTER
Pt requesting refill on One touch ultra Blue test strips. Pt states that the script that has been previously sent in is stating that pt test his sugar once daily but he actually test 3-4 times daily. Please send script over to [facility] on [address].

## 2019-05-28 RX ORDER — FUROSEMIDE 20 MG/1
TABLET ORAL
Qty: 30 TABLET | Refills: 2 | Status: SHIPPED | OUTPATIENT
Start: 2019-05-28 | End: 2019-08-14 | Stop reason: SDUPTHER

## 2019-06-10 RX ORDER — IBUPROFEN 800 MG/1
800 TABLET ORAL
Qty: 270 TABLET | Refills: 0 | Status: SHIPPED | OUTPATIENT
Start: 2019-06-10 | End: 2019-10-21 | Stop reason: SDUPTHER

## 2019-06-15 NOTE — TELEPHONE ENCOUNTER
Medication:   Requested Prescriptions     Pending Prescriptions Disp Refills    cyclobenzaprine (FLEXERIL) 5 MG tablet [Pharmacy Med Name: CYCLOBENZAPRINE 5 MG TABLET] 20 tablet 0     Sig: TAKE 1 TABLET BY MOUTH 3 TIMES A DAY AS NEEDED FOR MUSCLE SPASMS      Last Filled: 2/13/2019     Patient Phone Number: [phone] (home)     Last appt: 5/6/2019   Next appt: 9/6/2019    Last OARRS:   RX Monitoring 1/9/2019   Attestation The Prescription Monitoring Report for this patient was reviewed today. Preferred Pharmacy:   [address] [place] [address], [address] - P [phone] - F [phone]  [address] [address]  Phone: [phone] Fax: [phone] [facility], [address] [facility] [phone] [facility] [phone]  [place] [address]  Phone: [phone] Fax: [phone]

## 2019-06-16 RX ORDER — CYCLOBENZAPRINE HCL 5 MG
TABLET ORAL
Qty: 20 TABLET | Refills: 0 | Status: SHIPPED | OUTPATIENT
Start: 2019-06-16 | End: 2019-10-02

## 2019-06-28 RX ORDER — ALLOPURINOL 300 MG/1
TABLET ORAL
Qty: 30 TABLET | Refills: 2 | Status: SHIPPED | OUTPATIENT
Start: 2019-06-28 | End: 2019-10-01 | Stop reason: SDUPTHER

## 2019-07-05 ENCOUNTER — HOSPITAL ENCOUNTER (OUTPATIENT)
Dept: CT IMAGING | Age: 59
Discharge: HOME | End: 2019-07-05
Payer: COMMERCIAL

## 2019-07-05 DIAGNOSIS — C25.0 MALIGNANT NEOPLASM OF HEAD OF PANCREAS (HCC): ICD-10-CM

## 2019-07-05 LAB
A/G RATIO: 1.5 (ref 1.1–2.2)
ALBUMIN SERPL-MCNC: 4 G/DL (ref 3.4–5)
ALP BLD-CCNC: 188 U/L (ref 40–129)
ALT SERPL-CCNC: 27 U/L (ref 10–40)
ANION GAP SERPL CALCULATED.3IONS-SCNC: 9 MMOL/L (ref 3–16)
AST SERPL-CCNC: 22 U/L (ref 15–37)
BILIRUB SERPL-MCNC: 0.4 MG/DL (ref 0–1)
BUN BLDV-MCNC: 10 MG/DL (ref 7–20)
CALCIUM SERPL-MCNC: 9.1 MG/DL (ref 8.3–10.6)
CHLORIDE BLD-SCNC: 99 MMOL/L (ref 99–110)
CO2: 28 MMOL/L (ref 21–32)
CREAT SERPL-MCNC: 0.9 MG/DL (ref 0.8–1.3)
GFR AFRICAN AMERICAN: >60
GFR NON-AFRICAN AMERICAN: >60
GLOBULIN: 2.6 G/DL
GLUCOSE BLD-MCNC: 198 MG/DL (ref 70–99)
POTASSIUM SERPL-SCNC: 3.8 MMOL/L (ref 3.5–5.1)
SODIUM BLD-SCNC: 136 MMOL/L (ref 136–145)
TOTAL PROTEIN: 6.6 G/DL (ref 6.4–8.2)

## 2019-07-05 PROCEDURE — 6360000004 HC RX CONTRAST MEDICATION: Performed by: INTERNAL MEDICINE

## 2019-07-05 PROCEDURE — 74177 CT ABD & PELVIS W/CONTRAST: CPT

## 2019-07-05 PROCEDURE — 80053 COMPREHEN METABOLIC PANEL: CPT

## 2019-07-05 PROCEDURE — 36415 COLL VENOUS BLD VENIPUNCTURE: CPT

## 2019-07-05 RX ADMIN — IOPAMIDOL 75 ML: 755 INJECTION, SOLUTION INTRAVENOUS at 15:56

## 2019-07-05 RX ADMIN — IOHEXOL 50 ML: 240 INJECTION, SOLUTION INTRATHECAL; INTRAVASCULAR; INTRAVENOUS; ORAL at 15:57

## 2019-08-15 RX ORDER — FUROSEMIDE 20 MG/1
TABLET ORAL
Qty: 30 TABLET | Refills: 1 | Status: SHIPPED | OUTPATIENT
Start: 2019-08-15 | End: 2019-09-10 | Stop reason: SDUPTHER

## 2019-09-10 RX ORDER — FUROSEMIDE 20 MG/1
TABLET ORAL
Qty: 30 TABLET | Refills: 1 | Status: SHIPPED | OUTPATIENT
Start: 2019-09-10 | End: 2019-10-07 | Stop reason: SDUPTHER

## 2019-10-01 RX ORDER — ALLOPURINOL 300 MG/1
TABLET ORAL
Qty: 30 TABLET | Refills: 0 | Status: SHIPPED | OUTPATIENT
Start: 2019-10-01 | End: 2019-10-25 | Stop reason: SDUPTHER

## 2019-10-02 ENCOUNTER — OFFICE VISIT (OUTPATIENT)
Dept: FAMILY MEDICINE CLINIC | Age: 59
End: 2019-10-02
Payer: COMMERCIAL

## 2019-10-02 VITALS
WEIGHT: 218 LBS | RESPIRATION RATE: 12 BRPM | DIASTOLIC BLOOD PRESSURE: 76 MMHG | SYSTOLIC BLOOD PRESSURE: 108 MMHG | OXYGEN SATURATION: 98 % | HEART RATE: 97 BPM | BODY MASS INDEX: 32.19 KG/M2

## 2019-10-02 DIAGNOSIS — D61.810 PANCYTOPENIA DUE TO CHEMOTHERAPY (HCC): ICD-10-CM

## 2019-10-02 DIAGNOSIS — E11.65 TYPE 2 DIABETES MELLITUS WITH HYPERGLYCEMIA, WITH LONG-TERM CURRENT USE OF INSULIN (HCC): Primary | ICD-10-CM

## 2019-10-02 DIAGNOSIS — Z79.4 TYPE 2 DIABETES MELLITUS WITH HYPERGLYCEMIA, WITH LONG-TERM CURRENT USE OF INSULIN (HCC): Primary | ICD-10-CM

## 2019-10-02 DIAGNOSIS — C25.0 MALIGNANT NEOPLASM OF HEAD OF PANCREAS (HCC): ICD-10-CM

## 2019-10-02 DIAGNOSIS — I10 ESSENTIAL HYPERTENSION: ICD-10-CM

## 2019-10-02 PROBLEM — E66.01 OBESITY, MORBID, BMI 40.0-49.9 (HCC): Status: RESOLVED | Noted: 2017-06-19 | Resolved: 2019-10-02

## 2019-10-02 PROCEDURE — G8484 FLU IMMUNIZE NO ADMIN: HCPCS | Performed by: FAMILY MEDICINE

## 2019-10-02 PROCEDURE — 3017F COLORECTAL CA SCREEN DOC REV: CPT | Performed by: FAMILY MEDICINE

## 2019-10-02 PROCEDURE — 99214 OFFICE O/P EST MOD 30 MIN: CPT | Performed by: FAMILY MEDICINE

## 2019-10-02 PROCEDURE — G8427 DOCREV CUR MEDS BY ELIG CLIN: HCPCS | Performed by: FAMILY MEDICINE

## 2019-10-02 PROCEDURE — 1036F TOBACCO NON-USER: CPT | Performed by: FAMILY MEDICINE

## 2019-10-02 PROCEDURE — G8417 CALC BMI ABV UP PARAM F/U: HCPCS | Performed by: FAMILY MEDICINE

## 2019-10-02 PROCEDURE — 2022F DILAT RTA XM EVC RTNOPTHY: CPT | Performed by: FAMILY MEDICINE

## 2019-10-02 PROCEDURE — 3045F PR MOST RECENT HEMOGLOBIN A1C LEVEL 7.0-9.0%: CPT | Performed by: FAMILY MEDICINE

## 2019-10-02 RX ORDER — CYCLOBENZAPRINE HCL 5 MG
TABLET ORAL
Qty: 30 TABLET | Refills: 2 | Status: SHIPPED | OUTPATIENT
Start: 2019-10-02 | End: 2020-01-01 | Stop reason: SDUPTHER

## 2019-10-07 RX ORDER — FUROSEMIDE 20 MG/1
TABLET ORAL
Qty: 30 TABLET | Refills: 5 | Status: SHIPPED | OUTPATIENT
Start: 2019-10-07 | End: 2020-03-02 | Stop reason: SDUPTHER

## 2019-10-15 DIAGNOSIS — E11.65 TYPE 2 DIABETES MELLITUS WITH HYPERGLYCEMIA, WITH LONG-TERM CURRENT USE OF INSULIN (HCC): ICD-10-CM

## 2019-10-15 DIAGNOSIS — Z79.4 TYPE 2 DIABETES MELLITUS WITH HYPERGLYCEMIA, WITH LONG-TERM CURRENT USE OF INSULIN (HCC): ICD-10-CM

## 2019-10-21 RX ORDER — IBUPROFEN 800 MG/1
800 TABLET ORAL
Qty: 270 TABLET | Refills: 0 | Status: SHIPPED | OUTPATIENT
Start: 2019-10-21 | End: 2020-01-09

## 2019-10-25 RX ORDER — ALLOPURINOL 300 MG/1
TABLET ORAL
Qty: 90 TABLET | Refills: 0 | Status: SHIPPED | OUTPATIENT
Start: 2019-10-25 | End: 2020-01-16

## 2019-11-22 DIAGNOSIS — E11.65 TYPE 2 DIABETES MELLITUS WITH HYPERGLYCEMIA, WITH LONG-TERM CURRENT USE OF INSULIN (HCC): ICD-10-CM

## 2019-11-22 DIAGNOSIS — Z79.4 TYPE 2 DIABETES MELLITUS WITH HYPERGLYCEMIA, WITH LONG-TERM CURRENT USE OF INSULIN (HCC): ICD-10-CM

## 2019-12-26 ENCOUNTER — HOSPITAL ENCOUNTER (OUTPATIENT)
Dept: CT IMAGING | Age: 59
Discharge: HOME | End: 2019-12-26
Payer: COMMERCIAL

## 2019-12-26 DIAGNOSIS — C25.0 MALIGNANT NEOPLASM OF HEAD OF PANCREAS (HCC): ICD-10-CM

## 2019-12-26 LAB
A/G RATIO: 1.7 (ref 1.1–2.2)
ALBUMIN SERPL-MCNC: 3.8 G/DL (ref 3.4–5)
ALP BLD-CCNC: 195 U/L (ref 40–129)
ALT SERPL-CCNC: 37 U/L (ref 10–40)
ANION GAP SERPL CALCULATED.3IONS-SCNC: 9 MMOL/L (ref 3–16)
AST SERPL-CCNC: 25 U/L (ref 15–37)
BILIRUB SERPL-MCNC: 0.3 MG/DL (ref 0–1)
BUN BLDV-MCNC: 7 MG/DL (ref 7–20)
CALCIUM SERPL-MCNC: 8.8 MG/DL (ref 8.3–10.6)
CHLORIDE BLD-SCNC: 101 MMOL/L (ref 99–110)
CO2: 27 MMOL/L (ref 21–32)
CREAT SERPL-MCNC: 0.8 MG/DL (ref 0.8–1.3)
GFR AFRICAN AMERICAN: >60
GFR NON-AFRICAN AMERICAN: >60
GLOBULIN: 2.3 G/DL
GLUCOSE BLD-MCNC: 280 MG/DL (ref 70–99)
POTASSIUM SERPL-SCNC: 4.4 MMOL/L (ref 3.5–5.1)
SODIUM BLD-SCNC: 137 MMOL/L (ref 136–145)
TOTAL PROTEIN: 6.1 G/DL (ref 6.4–8.2)

## 2019-12-26 PROCEDURE — 74177 CT ABD & PELVIS W/CONTRAST: CPT

## 2019-12-26 PROCEDURE — 80053 COMPREHEN METABOLIC PANEL: CPT

## 2019-12-26 PROCEDURE — 36415 COLL VENOUS BLD VENIPUNCTURE: CPT

## 2019-12-26 PROCEDURE — 6360000004 HC RX CONTRAST MEDICATION: Performed by: FAMILY MEDICINE

## 2019-12-26 RX ADMIN — IOHEXOL 50 ML: 240 INJECTION, SOLUTION INTRATHECAL; INTRAVASCULAR; INTRAVENOUS; ORAL at 16:01

## 2019-12-26 RX ADMIN — IOPAMIDOL 75 ML: 755 INJECTION, SOLUTION INTRAVENOUS at 16:05

## 2020-01-01 ENCOUNTER — HOSPITAL ENCOUNTER (INPATIENT)
Age: 60
LOS: 7 days | Discharge: HOME | DRG: 393 | End: 2021-01-05
Attending: EMERGENCY MEDICINE | Admitting: FAMILY MEDICINE
Payer: MEDICARE

## 2020-01-01 ENCOUNTER — APPOINTMENT (OUTPATIENT)
Dept: GENERAL RADIOLOGY | Age: 60
End: 2020-01-01
Attending: SURGERY
Payer: MEDICARE

## 2020-01-01 ENCOUNTER — ANESTHESIA (OUTPATIENT)
Dept: OPERATING ROOM | Age: 60
End: 2020-01-01
Payer: MEDICARE

## 2020-01-01 ENCOUNTER — TELEPHONE (OUTPATIENT)
Dept: FAMILY MEDICINE CLINIC | Age: 60
End: 2020-01-01

## 2020-01-01 ENCOUNTER — HOSPITAL ENCOUNTER (OUTPATIENT)
Age: 60
Setting detail: OUTPATIENT SURGERY
Discharge: HOME | End: 2020-11-02
Attending: SURGERY | Admitting: SURGERY
Payer: MEDICARE

## 2020-01-01 ENCOUNTER — HOSPITAL ENCOUNTER (OUTPATIENT)
Dept: CT IMAGING | Age: 60
Discharge: HOME | End: 2020-10-30
Payer: MEDICARE

## 2020-01-01 ENCOUNTER — ANESTHESIA EVENT (OUTPATIENT)
Dept: OPERATING ROOM | Age: 60
End: 2020-01-01
Payer: MEDICARE

## 2020-01-01 ENCOUNTER — APPOINTMENT (OUTPATIENT)
Dept: CT IMAGING | Age: 60
DRG: 393 | End: 2020-01-01
Payer: MEDICARE

## 2020-01-01 ENCOUNTER — OFFICE VISIT (OUTPATIENT)
Dept: FAMILY MEDICINE CLINIC | Age: 60
End: 2020-01-01
Payer: MEDICARE

## 2020-01-01 ENCOUNTER — HOSPITAL ENCOUNTER (OUTPATIENT)
Age: 60
Discharge: HOME | End: 2020-09-10
Payer: MEDICARE

## 2020-01-01 ENCOUNTER — TELEPHONE (OUTPATIENT)
Dept: SURGERY | Age: 60
End: 2020-01-01

## 2020-01-01 ENCOUNTER — TELEPHONE (OUTPATIENT)
Dept: PHARMACY | Facility: CLINIC | Age: 60
End: 2020-01-01

## 2020-01-01 ENCOUNTER — OFFICE VISIT (OUTPATIENT)
Dept: PRIMARY CARE CLINIC | Age: 60
End: 2020-01-01
Payer: MEDICARE

## 2020-01-01 ENCOUNTER — ANESTHESIA (OUTPATIENT)
Dept: ENDOSCOPY | Age: 60
DRG: 393 | End: 2020-01-01
Payer: MEDICARE

## 2020-01-01 ENCOUNTER — ANESTHESIA EVENT (OUTPATIENT)
Dept: ENDOSCOPY | Age: 60
DRG: 393 | End: 2020-01-01
Payer: MEDICARE

## 2020-01-01 ENCOUNTER — HOSPITAL ENCOUNTER (INPATIENT)
Age: 60
LOS: 1 days | Discharge: HOME | DRG: 393 | End: 2020-12-22
Attending: EMERGENCY MEDICINE | Admitting: INTERNAL MEDICINE
Payer: MEDICARE

## 2020-01-01 ENCOUNTER — APPOINTMENT (OUTPATIENT)
Dept: GENERAL RADIOLOGY | Age: 60
DRG: 393 | End: 2020-01-01
Payer: MEDICARE

## 2020-01-01 VITALS
TEMPERATURE: 97.7 F | BODY MASS INDEX: 33.05 KG/M2 | WEIGHT: 211 LBS | OXYGEN SATURATION: 97 % | HEART RATE: 113 BPM | SYSTOLIC BLOOD PRESSURE: 100 MMHG | DIASTOLIC BLOOD PRESSURE: 70 MMHG

## 2020-01-01 VITALS
SYSTOLIC BLOOD PRESSURE: 113 MMHG | HEART RATE: 92 BPM | DIASTOLIC BLOOD PRESSURE: 82 MMHG | RESPIRATION RATE: 16 BRPM | OXYGEN SATURATION: 100 % | WEIGHT: 205 LBS | BODY MASS INDEX: 30.36 KG/M2 | TEMPERATURE: 97 F | HEIGHT: 69 IN

## 2020-01-01 VITALS
HEART RATE: 93 BPM | TEMPERATURE: 99.9 F | OXYGEN SATURATION: 98 % | WEIGHT: 200 LBS | SYSTOLIC BLOOD PRESSURE: 134 MMHG | HEIGHT: 69 IN | DIASTOLIC BLOOD PRESSURE: 83 MMHG | BODY MASS INDEX: 29.62 KG/M2 | RESPIRATION RATE: 16 BRPM

## 2020-01-01 VITALS
SYSTOLIC BLOOD PRESSURE: 108 MMHG | RESPIRATION RATE: 17 BRPM | OXYGEN SATURATION: 100 % | DIASTOLIC BLOOD PRESSURE: 61 MMHG

## 2020-01-01 VITALS
OXYGEN SATURATION: 100 % | DIASTOLIC BLOOD PRESSURE: 71 MMHG | RESPIRATION RATE: 1 BRPM | SYSTOLIC BLOOD PRESSURE: 128 MMHG

## 2020-01-01 DIAGNOSIS — R19.7 DIARRHEA, UNSPECIFIED TYPE: ICD-10-CM

## 2020-01-01 DIAGNOSIS — S82.891A MALLEOLAR FRACTURE, RIGHT, CLOSED, INITIAL ENCOUNTER: ICD-10-CM

## 2020-01-01 DIAGNOSIS — E87.6 HYPOKALEMIA: Primary | ICD-10-CM

## 2020-01-01 DIAGNOSIS — R53.1 GENERALIZED WEAKNESS: ICD-10-CM

## 2020-01-01 DIAGNOSIS — S82.64XA CLOSED NONDISPLACED FRACTURE OF LATERAL MALLEOLUS OF RIGHT FIBULA, INITIAL ENCOUNTER: ICD-10-CM

## 2020-01-01 LAB
A/G RATIO: 1.2 (ref 1.1–2.2)
A/G RATIO: 1.4 (ref 1.1–2.2)
ABO/RH: NORMAL
ALBUMIN SERPL-MCNC: 2.7 G/DL (ref 3.4–5)
ALBUMIN SERPL-MCNC: 2.8 G/DL (ref 3.4–5)
ALBUMIN SERPL-MCNC: 3.7 G/DL (ref 3.4–5)
ALP BLD-CCNC: 141 U/L (ref 40–129)
ALP BLD-CCNC: 148 U/L (ref 40–129)
ALP BLD-CCNC: 169 U/L (ref 40–129)
ALT SERPL-CCNC: 14 U/L (ref 10–40)
ALT SERPL-CCNC: 16 U/L (ref 10–40)
ALT SERPL-CCNC: 26 U/L (ref 10–40)
ANION GAP SERPL CALCULATED.3IONS-SCNC: 11 MMOL/L (ref 3–16)
ANION GAP SERPL CALCULATED.3IONS-SCNC: 12 MMOL/L (ref 3–16)
ANION GAP SERPL CALCULATED.3IONS-SCNC: 13 MMOL/L (ref 3–16)
ANION GAP SERPL CALCULATED.3IONS-SCNC: 5 MMOL/L (ref 3–16)
ANION GAP SERPL CALCULATED.3IONS-SCNC: 6 MMOL/L (ref 3–16)
ANION GAP SERPL CALCULATED.3IONS-SCNC: 7 MMOL/L (ref 3–16)
ANION GAP SERPL CALCULATED.3IONS-SCNC: 8 MMOL/L (ref 3–16)
ANION GAP SERPL CALCULATED.3IONS-SCNC: 8 MMOL/L (ref 3–16)
ANTIBODY SCREEN: NORMAL
APTT: 30.7 SEC (ref 24.2–36.2)
AST SERPL-CCNC: 17 U/L (ref 15–37)
AST SERPL-CCNC: 18 U/L (ref 15–37)
AST SERPL-CCNC: 18 U/L (ref 15–37)
BANDED NEUTROPHILS RELATIVE PERCENT: 6 % (ref 0–7)
BASOPHILS ABSOLUTE: 0 K/UL (ref 0–0.2)
BASOPHILS RELATIVE PERCENT: 0 %
BASOPHILS RELATIVE PERCENT: 0.4 %
BASOPHILS RELATIVE PERCENT: 0.5 %
BILIRUB SERPL-MCNC: 0.3 MG/DL (ref 0–1)
BILIRUB SERPL-MCNC: 0.4 MG/DL (ref 0–1)
BILIRUB SERPL-MCNC: 0.4 MG/DL (ref 0–1)
BILIRUBIN DIRECT: <0.2 MG/DL (ref 0–0.3)
BILIRUBIN URINE: NEGATIVE
BILIRUBIN URINE: NEGATIVE
BILIRUBIN, INDIRECT: ABNORMAL MG/DL (ref 0–1)
BLOOD, URINE: ABNORMAL
BLOOD, URINE: NEGATIVE
BUN BLDV-MCNC: 10 MG/DL (ref 7–20)
BUN BLDV-MCNC: 11 MG/DL (ref 7–20)
BUN BLDV-MCNC: 13 MG/DL (ref 7–20)
BUN BLDV-MCNC: 13 MG/DL (ref 7–20)
BUN BLDV-MCNC: 8 MG/DL (ref 7–20)
BUN BLDV-MCNC: 9 MG/DL (ref 7–20)
BURR CELLS: ABNORMAL
C DIFF TOXIN/ANTIGEN: NORMAL
CALCIUM SERPL-MCNC: 7.8 MG/DL (ref 8.3–10.6)
CALCIUM SERPL-MCNC: 8.1 MG/DL (ref 8.3–10.6)
CALCIUM SERPL-MCNC: 8.2 MG/DL (ref 8.3–10.6)
CALCIUM SERPL-MCNC: 8.4 MG/DL (ref 8.3–10.6)
CALCIUM SERPL-MCNC: 8.4 MG/DL (ref 8.3–10.6)
CALCIUM SERPL-MCNC: 8.7 MG/DL (ref 8.3–10.6)
CALCIUM SERPL-MCNC: 8.8 MG/DL (ref 8.3–10.6)
CALCIUM SERPL-MCNC: 9.5 MG/DL (ref 8.3–10.6)
CHLORIDE BLD-SCNC: 100 MMOL/L (ref 99–110)
CHLORIDE BLD-SCNC: 103 MMOL/L (ref 99–110)
CHLORIDE BLD-SCNC: 103 MMOL/L (ref 99–110)
CHLORIDE BLD-SCNC: 104 MMOL/L (ref 99–110)
CHLORIDE BLD-SCNC: 111 MMOL/L (ref 99–110)
CHLORIDE BLD-SCNC: 111 MMOL/L (ref 99–110)
CHLORIDE BLD-SCNC: 114 MMOL/L (ref 99–110)
CHLORIDE BLD-SCNC: 114 MMOL/L (ref 99–110)
CLARITY: CLEAR
CLARITY: CLEAR
CO2: 13 MMOL/L (ref 21–32)
CO2: 14 MMOL/L (ref 21–32)
CO2: 16 MMOL/L (ref 21–32)
CO2: 21 MMOL/L (ref 21–32)
CO2: 22 MMOL/L (ref 21–32)
CO2: 26 MMOL/L (ref 21–32)
CO2: 26 MMOL/L (ref 21–32)
CO2: 27 MMOL/L (ref 21–32)
COLOR: YELLOW
COLOR: YELLOW
CREAT SERPL-MCNC: 0.7 MG/DL (ref 0.8–1.3)
CREAT SERPL-MCNC: 0.7 MG/DL (ref 0.8–1.3)
CREAT SERPL-MCNC: 0.9 MG/DL (ref 0.8–1.3)
CREAT SERPL-MCNC: 1.2 MG/DL (ref 0.8–1.3)
CREAT SERPL-MCNC: 1.2 MG/DL (ref 0.8–1.3)
CREAT SERPL-MCNC: 1.3 MG/DL (ref 0.8–1.3)
CREAT SERPL-MCNC: 1.3 MG/DL (ref 0.8–1.3)
CREAT SERPL-MCNC: 1.4 MG/DL (ref 0.8–1.3)
EOSINOPHILS ABSOLUTE: 0 K/UL (ref 0–0.6)
EOSINOPHILS RELATIVE PERCENT: 0 %
EOSINOPHILS RELATIVE PERCENT: 0.1 %
EOSINOPHILS RELATIVE PERCENT: 1.1 %
EPITHELIAL CELLS, UA: 1 /HPF (ref 0–5)
EPITHELIAL CELLS, UA: 3 /HPF (ref 0–5)
ESTIMATED AVERAGE GLUCOSE: 231.7 MG/DL
ESTIMATED AVERAGE GLUCOSE: 240.3 MG/DL
ESTIMATED AVERAGE GLUCOSE: 240.3 MG/DL
GFR AFRICAN AMERICAN: >60
GFR NON-AFRICAN AMERICAN: 51
GFR NON-AFRICAN AMERICAN: 56
GFR NON-AFRICAN AMERICAN: 56
GFR NON-AFRICAN AMERICAN: >60
GLOBULIN: 2.3 G/DL
GLOBULIN: 2.6 G/DL
GLUCOSE BLD-MCNC: 103 MG/DL (ref 70–99)
GLUCOSE BLD-MCNC: 106 MG/DL (ref 70–99)
GLUCOSE BLD-MCNC: 122 MG/DL (ref 70–99)
GLUCOSE BLD-MCNC: 150 MG/DL (ref 70–99)
GLUCOSE BLD-MCNC: 158 MG/DL (ref 70–99)
GLUCOSE BLD-MCNC: 162 MG/DL (ref 70–99)
GLUCOSE BLD-MCNC: 162 MG/DL (ref 70–99)
GLUCOSE BLD-MCNC: 178 MG/DL (ref 70–99)
GLUCOSE BLD-MCNC: 180 MG/DL (ref 70–99)
GLUCOSE BLD-MCNC: 183 MG/DL (ref 70–99)
GLUCOSE BLD-MCNC: 208 MG/DL (ref 70–99)
GLUCOSE BLD-MCNC: 209 MG/DL (ref 70–99)
GLUCOSE BLD-MCNC: 215 MG/DL (ref 70–99)
GLUCOSE BLD-MCNC: 220 MG/DL (ref 70–99)
GLUCOSE BLD-MCNC: 245 MG/DL (ref 70–99)
GLUCOSE BLD-MCNC: 246 MG/DL (ref 70–99)
GLUCOSE BLD-MCNC: 251 MG/DL (ref 70–99)
GLUCOSE BLD-MCNC: 253 MG/DL (ref 70–99)
GLUCOSE BLD-MCNC: 257 MG/DL (ref 70–99)
GLUCOSE BLD-MCNC: 266 MG/DL (ref 70–99)
GLUCOSE BLD-MCNC: 54 MG/DL (ref 70–99)
GLUCOSE BLD-MCNC: 67 MG/DL (ref 70–99)
GLUCOSE BLD-MCNC: 68 MG/DL (ref 70–99)
GLUCOSE BLD-MCNC: 78 MG/DL (ref 70–99)
GLUCOSE BLD-MCNC: 87 MG/DL (ref 70–99)
GLUCOSE BLD-MCNC: 88 MG/DL (ref 70–99)
GLUCOSE BLD-MCNC: 90 MG/DL (ref 70–99)
GLUCOSE URINE: NEGATIVE MG/DL
GLUCOSE URINE: NEGATIVE MG/DL
HBA1C MFR BLD: 10 %
HBA1C MFR BLD: 10 %
HBA1C MFR BLD: 9.7 %
HCT VFR BLD CALC: 25.5 % (ref 40.5–52.5)
HCT VFR BLD CALC: 26.9 % (ref 40.5–52.5)
HCT VFR BLD CALC: 27.3 % (ref 40.5–52.5)
HCT VFR BLD CALC: 27.3 % (ref 40.5–52.5)
HCT VFR BLD CALC: 29.4 % (ref 40.5–52.5)
HCT VFR BLD CALC: 31 % (ref 40.5–52.5)
HEMOGLOBIN: 8.4 G/DL (ref 13.5–17.5)
HEMOGLOBIN: 8.5 G/DL (ref 13.5–17.5)
HEMOGLOBIN: 8.6 G/DL (ref 13.5–17.5)
HEMOGLOBIN: 8.7 G/DL (ref 13.5–17.5)
HEMOGLOBIN: 9.6 G/DL (ref 13.5–17.5)
HEMOGLOBIN: 9.8 G/DL (ref 13.5–17.5)
HYALINE CASTS: 14 /LPF (ref 0–8)
HYALINE CASTS: 19 /LPF (ref 0–8)
HYPOCHROMIA: ABNORMAL
INR BLD: 1.17 (ref 0.86–1.14)
INR BLD: 1.39 (ref 0.86–1.14)
IRON SATURATION: 19 % (ref 20–50)
IRON: 43 UG/DL (ref 59–158)
KETONES, URINE: NEGATIVE MG/DL
KETONES, URINE: NEGATIVE MG/DL
LACTIC ACID: 1.4 MMOL/L (ref 0.4–2)
LEUKOCYTE ESTERASE, URINE: ABNORMAL
LEUKOCYTE ESTERASE, URINE: NEGATIVE
LIPASE: 5 U/L (ref 13–60)
LV EF: 60 %
LVEF MODALITY: NORMAL
LYMPHOCYTES ABSOLUTE: 0.1 K/UL (ref 1–5.1)
LYMPHOCYTES ABSOLUTE: 0.1 K/UL (ref 1–5.1)
LYMPHOCYTES ABSOLUTE: 0.2 K/UL (ref 1–5.1)
LYMPHOCYTES RELATIVE PERCENT: 2.8 %
LYMPHOCYTES RELATIVE PERCENT: 3 %
LYMPHOCYTES RELATIVE PERCENT: 3.4 %
MACROCYTES: ABNORMAL
MAGNESIUM: 1.6 MG/DL (ref 1.8–2.4)
MAGNESIUM: 1.7 MG/DL (ref 1.8–2.4)
MAGNESIUM: 1.8 MG/DL (ref 1.8–2.4)
MAGNESIUM: 1.9 MG/DL (ref 1.8–2.4)
MCH RBC QN AUTO: 25.2 PG (ref 26–34)
MCH RBC QN AUTO: 25.5 PG (ref 26–34)
MCH RBC QN AUTO: 25.5 PG (ref 26–34)
MCH RBC QN AUTO: 25.8 PG (ref 26–34)
MCH RBC QN AUTO: 25.8 PG (ref 26–34)
MCHC RBC AUTO-ENTMCNC: 31.5 G/DL (ref 31–36)
MCHC RBC AUTO-ENTMCNC: 31.5 G/DL (ref 31–36)
MCHC RBC AUTO-ENTMCNC: 31.7 G/DL (ref 31–36)
MCHC RBC AUTO-ENTMCNC: 32.5 G/DL (ref 31–36)
MCHC RBC AUTO-ENTMCNC: 33 G/DL (ref 31–36)
MCV RBC AUTO: 78.3 FL (ref 80–100)
MCV RBC AUTO: 78.4 FL (ref 80–100)
MCV RBC AUTO: 80.1 FL (ref 80–100)
MCV RBC AUTO: 80.5 FL (ref 80–100)
MCV RBC AUTO: 81.7 FL (ref 80–100)
MICROSCOPIC EXAMINATION: YES
MICROSCOPIC EXAMINATION: YES
MONOCYTES ABSOLUTE: 0 K/UL (ref 0–1.3)
MONOCYTES ABSOLUTE: 0.3 K/UL (ref 0–1.3)
MONOCYTES ABSOLUTE: 0.4 K/UL (ref 0–1.3)
MONOCYTES RELATIVE PERCENT: 0.9 %
MONOCYTES RELATIVE PERCENT: 13 %
MONOCYTES RELATIVE PERCENT: 6.3 %
NEUTROPHILS ABSOLUTE: 2.4 K/UL (ref 1.7–7.7)
NEUTROPHILS ABSOLUTE: 2.9 K/UL (ref 1.7–7.7)
NEUTROPHILS ABSOLUTE: 4.6 K/UL (ref 1.7–7.7)
NEUTROPHILS RELATIVE PERCENT: 78 %
NEUTROPHILS RELATIVE PERCENT: 89.8 %
NEUTROPHILS RELATIVE PERCENT: 94.7 %
NITRITE, URINE: NEGATIVE
NITRITE, URINE: NEGATIVE
ORGANISM: ABNORMAL
OVALOCYTES: ABNORMAL
PDW BLD-RTO: 22.6 % (ref 12.4–15.4)
PDW BLD-RTO: 22.9 % (ref 12.4–15.4)
PDW BLD-RTO: 23.7 % (ref 12.4–15.4)
PDW BLD-RTO: 23.8 % (ref 12.4–15.4)
PDW BLD-RTO: 24.4 % (ref 12.4–15.4)
PERFORMED ON: ABNORMAL
PERFORMED ON: NORMAL
PH UA: 5 (ref 5–8)
PH UA: 7 (ref 5–8)
PLATELET # BLD: 187 K/UL (ref 135–450)
PLATELET # BLD: 215 K/UL (ref 135–450)
PLATELET # BLD: 261 K/UL (ref 135–450)
PLATELET # BLD: 68 K/UL (ref 135–450)
PLATELET # BLD: 72 K/UL (ref 135–450)
PLATELET SLIDE REVIEW: ADEQUATE
PMV BLD AUTO: 6.9 FL (ref 5–10.5)
PMV BLD AUTO: 7 FL (ref 5–10.5)
PMV BLD AUTO: 7.3 FL (ref 5–10.5)
PMV BLD AUTO: 7.5 FL (ref 5–10.5)
PMV BLD AUTO: 7.8 FL (ref 5–10.5)
POLYCHROMASIA: ABNORMAL
POTASSIUM REFLEX MAGNESIUM: 2.7 MMOL/L (ref 3.5–5.1)
POTASSIUM REFLEX MAGNESIUM: 4 MMOL/L (ref 3.5–5.1)
POTASSIUM SERPL-SCNC: 2.2 MMOL/L (ref 3.5–5.1)
POTASSIUM SERPL-SCNC: 2.5 MMOL/L (ref 3.5–5.1)
POTASSIUM SERPL-SCNC: 2.6 MMOL/L (ref 3.5–5.1)
POTASSIUM SERPL-SCNC: 2.7 MMOL/L (ref 3.5–5.1)
POTASSIUM SERPL-SCNC: 2.7 MMOL/L (ref 3.5–5.1)
POTASSIUM SERPL-SCNC: 4.1 MMOL/L (ref 3.5–5.1)
POTASSIUM SERPL-SCNC: 4.2 MMOL/L (ref 3.5–5.1)
PROTEIN UA: NEGATIVE MG/DL
PROTEIN UA: NEGATIVE MG/DL
PROTHROMBIN TIME: 13.6 SEC (ref 10–13.2)
PROTHROMBIN TIME: 16.2 SEC (ref 10–13.2)
RBC # BLD: 3.25 M/UL (ref 4.2–5.9)
RBC # BLD: 3.35 M/UL (ref 4.2–5.9)
RBC # BLD: 3.37 M/UL (ref 4.2–5.9)
RBC # BLD: 3.76 M/UL (ref 4.2–5.9)
RBC # BLD: 3.86 M/UL (ref 4.2–5.9)
RBC UA: 2 /HPF (ref 0–4)
RBC UA: 5 /HPF (ref 0–4)
SARS-COV-2: NOT DETECTED
SCHISTOCYTES: ABNORMAL
SLIDE REVIEW: ABNORMAL
SODIUM BLD-SCNC: 133 MMOL/L (ref 136–145)
SODIUM BLD-SCNC: 133 MMOL/L (ref 136–145)
SODIUM BLD-SCNC: 134 MMOL/L (ref 136–145)
SODIUM BLD-SCNC: 137 MMOL/L (ref 136–145)
SODIUM BLD-SCNC: 138 MMOL/L (ref 136–145)
SODIUM BLD-SCNC: 139 MMOL/L (ref 136–145)
SODIUM BLD-SCNC: 140 MMOL/L (ref 136–145)
SODIUM BLD-SCNC: 141 MMOL/L (ref 136–145)
SPECIFIC GRAVITY UA: 1.01 (ref 1–1.03)
SPECIFIC GRAVITY UA: >1.03 (ref 1–1.03)
TEAR DROP CELLS: ABNORMAL
TOTAL IRON BINDING CAPACITY: 222 UG/DL (ref 260–445)
TOTAL PROTEIN: 5.1 G/DL (ref 6.4–8.2)
TOTAL PROTEIN: 5.2 G/DL (ref 6.4–8.2)
TOTAL PROTEIN: 6.3 G/DL (ref 6.4–8.2)
TOXIC GRANULATION: PRESENT
TSH REFLEX: 0.53 UIU/ML (ref 0.27–4.2)
TSH SERPL DL<=0.05 MIU/L-ACNC: 2.4 UIU/ML (ref 0.27–4.2)
URINE CULTURE, ROUTINE: ABNORMAL
URINE REFLEX TO CULTURE: ABNORMAL
URINE REFLEX TO CULTURE: YES
URINE TYPE: ABNORMAL
URINE TYPE: ABNORMAL
UROBILINOGEN, URINE: 0.2 E.U./DL
UROBILINOGEN, URINE: 0.2 E.U./DL
WBC # BLD: 2.9 K/UL (ref 4–11)
WBC # BLD: 3 K/UL (ref 4–11)
WBC # BLD: 3.4 K/UL (ref 4–11)
WBC # BLD: 5.1 K/UL (ref 4–11)
WBC # BLD: 6.5 K/UL (ref 4–11)
WBC UA: 2 /HPF (ref 0–5)
WBC UA: 25 /HPF (ref 0–5)

## 2020-01-01 PROCEDURE — 93005 ELECTROCARDIOGRAM TRACING: CPT | Performed by: EMERGENCY MEDICINE

## 2020-01-01 PROCEDURE — 83735 ASSAY OF MAGNESIUM: CPT

## 2020-01-01 PROCEDURE — 6370000000 HC RX 637 (ALT 250 FOR IP): Performed by: FAMILY MEDICINE

## 2020-01-01 PROCEDURE — 83605 ASSAY OF LACTIC ACID: CPT

## 2020-01-01 PROCEDURE — 2709999900 HC NON-CHARGEABLE SUPPLY: Performed by: SURGERY

## 2020-01-01 PROCEDURE — 7100000001 HC PACU RECOVERY - ADDTL 15 MIN: Performed by: INTERNAL MEDICINE

## 2020-01-01 PROCEDURE — 85014 HEMATOCRIT: CPT

## 2020-01-01 PROCEDURE — 85018 HEMOGLOBIN: CPT

## 2020-01-01 PROCEDURE — 6360000002 HC RX W HCPCS: Performed by: FAMILY MEDICINE

## 2020-01-01 PROCEDURE — 99214 OFFICE O/P EST MOD 30 MIN: CPT | Performed by: FAMILY MEDICINE

## 2020-01-01 PROCEDURE — 2580000003 HC RX 258: Performed by: FAMILY MEDICINE

## 2020-01-01 PROCEDURE — 6370000000 HC RX 637 (ALT 250 FOR IP): Performed by: NURSE PRACTITIONER

## 2020-01-01 PROCEDURE — 99222 1ST HOSP IP/OBS MODERATE 55: CPT | Performed by: NURSE PRACTITIONER

## 2020-01-01 PROCEDURE — 73630 X-RAY EXAM OF FOOT: CPT

## 2020-01-01 PROCEDURE — 3700000000 HC ANESTHESIA ATTENDED CARE: Performed by: INTERNAL MEDICINE

## 2020-01-01 PROCEDURE — 83540 ASSAY OF IRON: CPT

## 2020-01-01 PROCEDURE — 1036F TOBACCO NON-USER: CPT | Performed by: FAMILY MEDICINE

## 2020-01-01 PROCEDURE — 80076 HEPATIC FUNCTION PANEL: CPT

## 2020-01-01 PROCEDURE — 96365 THER/PROPH/DIAG IV INF INIT: CPT

## 2020-01-01 PROCEDURE — 87186 SC STD MICRODIL/AGAR DIL: CPT

## 2020-01-01 PROCEDURE — 99223 1ST HOSP IP/OBS HIGH 75: CPT | Performed by: NURSE PRACTITIONER

## 2020-01-01 PROCEDURE — 85025 COMPLETE CBC W/AUTO DIFF WBC: CPT

## 2020-01-01 PROCEDURE — 2580000003 HC RX 258: Performed by: HOSPITALIST

## 2020-01-01 PROCEDURE — 74177 CT ABD & PELVIS W/CONTRAST: CPT

## 2020-01-01 PROCEDURE — 2709999900 HC NON-CHARGEABLE SUPPLY: Performed by: INTERNAL MEDICINE

## 2020-01-01 PROCEDURE — 3046F HEMOGLOBIN A1C LEVEL >9.0%: CPT | Performed by: FAMILY MEDICINE

## 2020-01-01 PROCEDURE — 96375 TX/PRO/DX INJ NEW DRUG ADDON: CPT

## 2020-01-01 PROCEDURE — 83036 HEMOGLOBIN GLYCOSYLATED A1C: CPT

## 2020-01-01 PROCEDURE — 81001 URINALYSIS AUTO W/SCOPE: CPT

## 2020-01-01 PROCEDURE — 6360000002 HC RX W HCPCS: Performed by: NURSE ANESTHETIST, CERTIFIED REGISTERED

## 2020-01-01 PROCEDURE — 3017F COLORECTAL CA SCREEN DOC REV: CPT | Performed by: FAMILY MEDICINE

## 2020-01-01 PROCEDURE — 7100000010 HC PHASE II RECOVERY - FIRST 15 MIN: Performed by: SURGERY

## 2020-01-01 PROCEDURE — 36415 COLL VENOUS BLD VENIPUNCTURE: CPT

## 2020-01-01 PROCEDURE — 2500000003 HC RX 250 WO HCPCS: Performed by: HOSPITALIST

## 2020-01-01 PROCEDURE — 94760 N-INVAS EAR/PLS OXIMETRY 1: CPT

## 2020-01-01 PROCEDURE — 6360000002 HC RX W HCPCS: Performed by: PHYSICIAN ASSISTANT

## 2020-01-01 PROCEDURE — C9113 INJ PANTOPRAZOLE SODIUM, VIA: HCPCS | Performed by: INTERNAL MEDICINE

## 2020-01-01 PROCEDURE — 80048 BASIC METABOLIC PNL TOTAL CA: CPT

## 2020-01-01 PROCEDURE — G0378 HOSPITAL OBSERVATION PER HR: HCPCS

## 2020-01-01 PROCEDURE — 74174 CTA ABD&PLVS W/CONTRAST: CPT

## 2020-01-01 PROCEDURE — 2500000003 HC RX 250 WO HCPCS: Performed by: NURSE ANESTHETIST, CERTIFIED REGISTERED

## 2020-01-01 PROCEDURE — 2500000003 HC RX 250 WO HCPCS: Performed by: PHYSICIAN ASSISTANT

## 2020-01-01 PROCEDURE — 6370000000 HC RX 637 (ALT 250 FOR IP): Performed by: PHYSICIAN ASSISTANT

## 2020-01-01 PROCEDURE — 3700000001 HC ADD 15 MINUTES (ANESTHESIA): Performed by: SURGERY

## 2020-01-01 PROCEDURE — 2580000003 HC RX 258: Performed by: INTERNAL MEDICINE

## 2020-01-01 PROCEDURE — 2580000003 HC RX 258: Performed by: SURGERY

## 2020-01-01 PROCEDURE — 87449 NOS EACH ORGANISM AG IA: CPT

## 2020-01-01 PROCEDURE — 86901 BLOOD TYPING SEROLOGIC RH(D): CPT

## 2020-01-01 PROCEDURE — 87505 NFCT AGENT DETECTION GI: CPT

## 2020-01-01 PROCEDURE — 2580000003 HC RX 258: Performed by: PHYSICIAN ASSISTANT

## 2020-01-01 PROCEDURE — 6360000002 HC RX W HCPCS: Performed by: NURSE PRACTITIONER

## 2020-01-01 PROCEDURE — 99284 EMERGENCY DEPT VISIT MOD MDM: CPT

## 2020-01-01 PROCEDURE — 97166 OT EVAL MOD COMPLEX 45 MIN: CPT

## 2020-01-01 PROCEDURE — 6370000000 HC RX 637 (ALT 250 FOR IP): Performed by: INTERNAL MEDICINE

## 2020-01-01 PROCEDURE — 97116 GAIT TRAINING THERAPY: CPT

## 2020-01-01 PROCEDURE — 27786 TREATMENT OF ANKLE FRACTURE: CPT | Performed by: NURSE PRACTITIONER

## 2020-01-01 PROCEDURE — 6360000002 HC RX W HCPCS: Performed by: INTERNAL MEDICINE

## 2020-01-01 PROCEDURE — 3700000001 HC ADD 15 MINUTES (ANESTHESIA): Performed by: INTERNAL MEDICINE

## 2020-01-01 PROCEDURE — 1200000000 HC SEMI PRIVATE

## 2020-01-01 PROCEDURE — 0DJD8ZZ INSPECTION OF LOWER INTESTINAL TRACT, VIA NATURAL OR ARTIFICIAL OPENING ENDOSCOPIC: ICD-10-PCS | Performed by: INTERNAL MEDICINE

## 2020-01-01 PROCEDURE — 85027 COMPLETE CBC AUTOMATED: CPT

## 2020-01-01 PROCEDURE — 97162 PT EVAL MOD COMPLEX 30 MIN: CPT

## 2020-01-01 PROCEDURE — 93005 ELECTROCARDIOGRAM TRACING: CPT | Performed by: HOSPITALIST

## 2020-01-01 PROCEDURE — 87086 URINE CULTURE/COLONY COUNT: CPT

## 2020-01-01 PROCEDURE — 97535 SELF CARE MNGMENT TRAINING: CPT

## 2020-01-01 PROCEDURE — 97530 THERAPEUTIC ACTIVITIES: CPT

## 2020-01-01 PROCEDURE — 3700000000 HC ANESTHESIA ATTENDED CARE: Performed by: SURGERY

## 2020-01-01 PROCEDURE — 71045 X-RAY EXAM CHEST 1 VIEW: CPT

## 2020-01-01 PROCEDURE — 3600000002 HC SURGERY LEVEL 2 BASE: Performed by: SURGERY

## 2020-01-01 PROCEDURE — 2580000003 HC RX 258: Performed by: NURSE ANESTHETIST, CERTIFIED REGISTERED

## 2020-01-01 PROCEDURE — 7100000000 HC PACU RECOVERY - FIRST 15 MIN: Performed by: SURGERY

## 2020-01-01 PROCEDURE — 2500000003 HC RX 250 WO HCPCS: Performed by: SURGERY

## 2020-01-01 PROCEDURE — 84443 ASSAY THYROID STIM HORMONE: CPT

## 2020-01-01 PROCEDURE — 77001 FLUOROGUIDE FOR VEIN DEVICE: CPT

## 2020-01-01 PROCEDURE — G8417 CALC BMI ABV UP PARAM F/U: HCPCS | Performed by: FAMILY MEDICINE

## 2020-01-01 PROCEDURE — 6360000002 HC RX W HCPCS: Performed by: SURGERY

## 2020-01-01 PROCEDURE — 87077 CULTURE AEROBIC IDENTIFY: CPT

## 2020-01-01 PROCEDURE — 99211 OFF/OP EST MAY X REQ PHY/QHP: CPT | Performed by: NURSE PRACTITIONER

## 2020-01-01 PROCEDURE — 7100000001 HC PACU RECOVERY - ADDTL 15 MIN: Performed by: SURGERY

## 2020-01-01 PROCEDURE — 86900 BLOOD TYPING SEROLOGIC ABO: CPT

## 2020-01-01 PROCEDURE — 2022F DILAT RTA XM EVC RTNOPTHY: CPT | Performed by: FAMILY MEDICINE

## 2020-01-01 PROCEDURE — 7100000000 HC PACU RECOVERY - FIRST 15 MIN: Performed by: INTERNAL MEDICINE

## 2020-01-01 PROCEDURE — 99283 EMERGENCY DEPT VISIT LOW MDM: CPT

## 2020-01-01 PROCEDURE — 6360000004 HC RX CONTRAST MEDICATION: Performed by: INTERNAL MEDICINE

## 2020-01-01 PROCEDURE — 84132 ASSAY OF SERUM POTASSIUM: CPT

## 2020-01-01 PROCEDURE — 36590 REMOVAL TUNNELED CV CATH: CPT | Performed by: SURGERY

## 2020-01-01 PROCEDURE — 7100000011 HC PHASE II RECOVERY - ADDTL 15 MIN: Performed by: SURGERY

## 2020-01-01 PROCEDURE — 97165 OT EVAL LOW COMPLEX 30 MIN: CPT

## 2020-01-01 PROCEDURE — 6360000004 HC RX CONTRAST MEDICATION: Performed by: PHYSICIAN ASSISTANT

## 2020-01-01 PROCEDURE — 77001 FLUOROGUIDE FOR VEIN DEVICE: CPT | Performed by: SURGERY

## 2020-01-01 PROCEDURE — 87324 CLOSTRIDIUM AG IA: CPT

## 2020-01-01 PROCEDURE — 80053 COMPREHEN METABOLIC PANEL: CPT

## 2020-01-01 PROCEDURE — 99221 1ST HOSP IP/OBS SF/LOW 40: CPT | Performed by: SURGERY

## 2020-01-01 PROCEDURE — C1788 PORT, INDWELLING, IMP: HCPCS | Performed by: SURGERY

## 2020-01-01 PROCEDURE — 86850 RBC ANTIBODY SCREEN: CPT

## 2020-01-01 PROCEDURE — 2580000003 HC RX 258

## 2020-01-01 PROCEDURE — 86301 IMMUNOASSAY TUMOR CA 19-9: CPT

## 2020-01-01 PROCEDURE — 83550 IRON BINDING TEST: CPT

## 2020-01-01 PROCEDURE — 36561 INSERT TUNNELED CV CATH: CPT | Performed by: SURGERY

## 2020-01-01 PROCEDURE — 70450 CT HEAD/BRAIN W/O DYE: CPT

## 2020-01-01 PROCEDURE — 99233 SBSQ HOSP IP/OBS HIGH 50: CPT | Performed by: NURSE PRACTITIONER

## 2020-01-01 PROCEDURE — 85610 PROTHROMBIN TIME: CPT

## 2020-01-01 PROCEDURE — 85730 THROMBOPLASTIN TIME PARTIAL: CPT

## 2020-01-01 PROCEDURE — 29125 APPL SHORT ARM SPLINT STATIC: CPT

## 2020-01-01 PROCEDURE — 83690 ASSAY OF LIPASE: CPT

## 2020-01-01 PROCEDURE — 73610 X-RAY EXAM OF ANKLE: CPT

## 2020-01-01 PROCEDURE — G8427 DOCREV CUR MEDS BY ELIG CLIN: HCPCS | Performed by: FAMILY MEDICINE

## 2020-01-01 PROCEDURE — 3600000012 HC SURGERY LEVEL 2 ADDTL 15MIN: Performed by: SURGERY

## 2020-01-01 PROCEDURE — 3609008400 HC SIGMOIDOSCOPY DIAGNOSTIC: Performed by: INTERNAL MEDICINE

## 2020-01-01 PROCEDURE — 93306 TTE W/DOPPLER COMPLETE: CPT

## 2020-01-01 DEVICE — PORT INFUS OD2.7MM ID1.5MM INTRO 8FR TI POLYUR CATH DETACH CT80STPD] ANGIODYNAMICS INC]: Type: IMPLANTABLE DEVICE | Site: CHEST | Status: FUNCTIONAL

## 2020-01-01 RX ORDER — NICOTINE POLACRILEX 4 MG
15 LOZENGE BUCCAL PRN
Status: DISCONTINUED | OUTPATIENT
Start: 2020-01-01 | End: 2021-01-01 | Stop reason: HOSPADM

## 2020-01-01 RX ORDER — SODIUM CHLORIDE 9 MG/ML
10 INJECTION INTRAVENOUS EVERY 12 HOURS
Status: DISCONTINUED | OUTPATIENT
Start: 2020-01-01 | End: 2020-01-01 | Stop reason: HOSPADM

## 2020-01-01 RX ORDER — DIGOXIN 125 MCG
125 TABLET ORAL DAILY
Status: DISCONTINUED | OUTPATIENT
Start: 2021-01-01 | End: 2021-01-01 | Stop reason: HOSPADM

## 2020-01-01 RX ORDER — ACETAMINOPHEN 650 MG/1
650 SUPPOSITORY RECTAL EVERY 6 HOURS PRN
Status: DISCONTINUED | OUTPATIENT
Start: 2020-01-01 | End: 2021-01-01 | Stop reason: HOSPADM

## 2020-01-01 RX ORDER — IBUPROFEN 800 MG/1
TABLET ORAL
Qty: 270 TABLET | Refills: 0 | Status: CANCELLED | OUTPATIENT
Start: 2020-01-01

## 2020-01-01 RX ORDER — POTASSIUM CHLORIDE 20 MEQ/1
40 TABLET, EXTENDED RELEASE ORAL PRN
Status: DISCONTINUED | OUTPATIENT
Start: 2020-01-01 | End: 2021-01-01 | Stop reason: HOSPADM

## 2020-01-01 RX ORDER — MIDAZOLAM HYDROCHLORIDE 1 MG/ML
INJECTION INTRAMUSCULAR; INTRAVENOUS PRN
Status: DISCONTINUED | OUTPATIENT
Start: 2020-01-01 | End: 2020-01-01 | Stop reason: SDUPTHER

## 2020-01-01 RX ORDER — POTASSIUM CHLORIDE 20 MEQ/1
40 TABLET, EXTENDED RELEASE ORAL 2 TIMES DAILY WITH MEALS
Status: DISCONTINUED | OUTPATIENT
Start: 2020-01-01 | End: 2020-01-01

## 2020-01-01 RX ORDER — ACETAMINOPHEN 325 MG/1
650 TABLET ORAL EVERY 6 HOURS PRN
Status: DISCONTINUED | OUTPATIENT
Start: 2020-01-01 | End: 2020-01-01 | Stop reason: HOSPADM

## 2020-01-01 RX ORDER — OCTREOTIDE ACETATE 100 UG/ML
100 INJECTION, SOLUTION INTRAVENOUS; SUBCUTANEOUS 3 TIMES DAILY PRN
Status: DISCONTINUED | OUTPATIENT
Start: 2020-01-01 | End: 2021-01-01 | Stop reason: HOSPADM

## 2020-01-01 RX ORDER — PROPOFOL 10 MG/ML
INJECTION, EMULSION INTRAVENOUS PRN
Status: DISCONTINUED | OUTPATIENT
Start: 2020-01-01 | End: 2020-01-01 | Stop reason: SDUPTHER

## 2020-01-01 RX ORDER — LOPERAMIDE HYDROCHLORIDE 2 MG/1
2 CAPSULE ORAL 4 TIMES DAILY PRN
Status: ON HOLD | COMMUNITY
End: 2021-01-01 | Stop reason: HOSPADM

## 2020-01-01 RX ORDER — METOCLOPRAMIDE HYDROCHLORIDE 5 MG/ML
10 INJECTION INTRAMUSCULAR; INTRAVENOUS ONCE
Status: COMPLETED | OUTPATIENT
Start: 2020-01-01 | End: 2020-01-01

## 2020-01-01 RX ORDER — INSULIN LISPRO 100 [IU]/ML
INJECTION, SOLUTION INTRAVENOUS; SUBCUTANEOUS
Qty: 6 PEN | Refills: 2 | Status: SHIPPED | OUTPATIENT
Start: 2020-01-01 | End: 2020-01-01

## 2020-01-01 RX ORDER — POTASSIUM CHLORIDE 20 MEQ/1
TABLET, EXTENDED RELEASE ORAL
Qty: 180 TABLET | Refills: 3 | OUTPATIENT
Start: 2020-01-01

## 2020-01-01 RX ORDER — INSULIN LISPRO 100 [IU]/ML
0-12 INJECTION, SOLUTION INTRAVENOUS; SUBCUTANEOUS
Status: DISCONTINUED | OUTPATIENT
Start: 2020-01-01 | End: 2021-01-01 | Stop reason: HOSPADM

## 2020-01-01 RX ORDER — CLINDAMYCIN PHOSPHATE 900 MG/50ML
900 INJECTION INTRAVENOUS ONCE
Status: COMPLETED | OUTPATIENT
Start: 2020-01-01 | End: 2020-01-01

## 2020-01-01 RX ORDER — SODIUM CHLORIDE 0.9 % (FLUSH) 0.9 %
10 SYRINGE (ML) INJECTION PRN
Status: DISCONTINUED | OUTPATIENT
Start: 2020-01-01 | End: 2020-01-01 | Stop reason: HOSPADM

## 2020-01-01 RX ORDER — INSULIN LISPRO 100 [IU]/ML
0-6 INJECTION, SOLUTION INTRAVENOUS; SUBCUTANEOUS NIGHTLY
Status: DISCONTINUED | OUTPATIENT
Start: 2020-01-01 | End: 2020-01-01 | Stop reason: HOSPADM

## 2020-01-01 RX ORDER — ONDANSETRON 2 MG/ML
4 INJECTION INTRAMUSCULAR; INTRAVENOUS EVERY 6 HOURS PRN
Status: DISCONTINUED | OUTPATIENT
Start: 2020-01-01 | End: 2020-01-01 | Stop reason: HOSPADM

## 2020-01-01 RX ORDER — POTASSIUM CHLORIDE 20 MEQ/1
TABLET, EXTENDED RELEASE ORAL
Qty: 180 TABLET | Refills: 3 | Status: ON HOLD | OUTPATIENT
Start: 2020-01-01 | End: 2021-01-01 | Stop reason: HOSPADM

## 2020-01-01 RX ORDER — POTASSIUM CHLORIDE 7.45 MG/ML
10 INJECTION INTRAVENOUS
Status: DISPENSED | OUTPATIENT
Start: 2020-01-01 | End: 2021-01-01

## 2020-01-01 RX ORDER — SODIUM CHLORIDE 0.9 % (FLUSH) 0.9 %
10 SYRINGE (ML) INJECTION EVERY 12 HOURS SCHEDULED
Status: DISCONTINUED | OUTPATIENT
Start: 2020-01-01 | End: 2020-01-01 | Stop reason: HOSPADM

## 2020-01-01 RX ORDER — CLINDAMYCIN HYDROCHLORIDE 300 MG/1
600 CAPSULE ORAL 3 TIMES DAILY
Qty: 18 CAPSULE | Refills: 0 | Status: SHIPPED | OUTPATIENT
Start: 2020-01-01 | End: 2020-01-01

## 2020-01-01 RX ORDER — OXYCODONE HYDROCHLORIDE 5 MG/1
10 TABLET ORAL PRN
Status: DISCONTINUED | OUTPATIENT
Start: 2020-01-01 | End: 2020-01-01 | Stop reason: HOSPADM

## 2020-01-01 RX ORDER — SODIUM CHLORIDE 0.9 % (FLUSH) 0.9 %
10 SYRINGE (ML) INJECTION EVERY 12 HOURS SCHEDULED
Status: DISCONTINUED | OUTPATIENT
Start: 2020-01-01 | End: 2021-01-01 | Stop reason: HOSPADM

## 2020-01-01 RX ORDER — PHENYLEPHRINE HCL IN 0.9% NACL 1 MG/10 ML
SYRINGE (ML) INTRAVENOUS PRN
Status: DISCONTINUED | OUTPATIENT
Start: 2020-01-01 | End: 2020-01-01 | Stop reason: SDUPTHER

## 2020-01-01 RX ORDER — DIPHENHYDRAMINE HYDROCHLORIDE 50 MG/ML
25 INJECTION INTRAMUSCULAR; INTRAVENOUS ONCE
Status: COMPLETED | OUTPATIENT
Start: 2020-01-01 | End: 2020-01-01

## 2020-01-01 RX ORDER — SODIUM CHLORIDE, SODIUM LACTATE, POTASSIUM CHLORIDE, AND CALCIUM CHLORIDE .6; .31; .03; .02 G/100ML; G/100ML; G/100ML; G/100ML
2000 INJECTION, SOLUTION INTRAVENOUS ONCE
Status: COMPLETED | OUTPATIENT
Start: 2020-01-01 | End: 2020-01-01

## 2020-01-01 RX ORDER — HYDROMORPHONE HYDROCHLORIDE 1 MG/ML
0.5 INJECTION, SOLUTION INTRAMUSCULAR; INTRAVENOUS; SUBCUTANEOUS EVERY 30 MIN PRN
Status: DISCONTINUED | OUTPATIENT
Start: 2020-01-01 | End: 2021-01-01 | Stop reason: HOSPADM

## 2020-01-01 RX ORDER — DEXTROSE MONOHYDRATE 50 MG/ML
100 INJECTION, SOLUTION INTRAVENOUS PRN
Status: DISCONTINUED | OUTPATIENT
Start: 2020-01-01 | End: 2020-01-01 | Stop reason: HOSPADM

## 2020-01-01 RX ORDER — MAGNESIUM SULFATE IN WATER 40 MG/ML
2 INJECTION, SOLUTION INTRAVENOUS ONCE
Status: COMPLETED | OUTPATIENT
Start: 2020-01-01 | End: 2020-01-01

## 2020-01-01 RX ORDER — FUROSEMIDE 40 MG/1
TABLET ORAL
Qty: 90 TABLET | Refills: 0 | Status: SHIPPED | OUTPATIENT
Start: 2020-01-01 | End: 2020-01-01

## 2020-01-01 RX ORDER — DIGOXIN 0.25 MG/ML
250 INJECTION INTRAMUSCULAR; INTRAVENOUS ONCE
Status: COMPLETED | OUTPATIENT
Start: 2020-01-01 | End: 2020-01-01

## 2020-01-01 RX ORDER — OXYCODONE HYDROCHLORIDE 5 MG/1
5 TABLET ORAL PRN
Status: DISCONTINUED | OUTPATIENT
Start: 2020-01-01 | End: 2020-01-01 | Stop reason: HOSPADM

## 2020-01-01 RX ORDER — DIPHENOXYLATE HYDROCHLORIDE AND ATROPINE SULFATE 2.5; .025 MG/1; MG/1
1 TABLET ORAL 4 TIMES DAILY PRN
Status: DISCONTINUED | OUTPATIENT
Start: 2020-01-01 | End: 2021-01-01

## 2020-01-01 RX ORDER — ONDANSETRON 2 MG/ML
4 INJECTION INTRAMUSCULAR; INTRAVENOUS EVERY 6 HOURS PRN
Status: DISCONTINUED | OUTPATIENT
Start: 2020-01-01 | End: 2021-01-01 | Stop reason: HOSPADM

## 2020-01-01 RX ORDER — POTASSIUM CHLORIDE 750 MG/1
40 TABLET, FILM COATED, EXTENDED RELEASE ORAL ONCE
Status: DISCONTINUED | OUTPATIENT
Start: 2020-01-01 | End: 2020-01-01

## 2020-01-01 RX ORDER — DILTIAZEM HYDROCHLORIDE 60 MG/1
60 CAPSULE, EXTENDED RELEASE ORAL 2 TIMES DAILY
Status: DISCONTINUED | OUTPATIENT
Start: 2020-01-01 | End: 2020-01-01

## 2020-01-01 RX ORDER — POTASSIUM CHLORIDE 7.45 MG/ML
10 INJECTION INTRAVENOUS 2 TIMES DAILY
Status: DISCONTINUED | OUTPATIENT
Start: 2020-01-01 | End: 2020-01-01

## 2020-01-01 RX ORDER — SODIUM CHLORIDE 9 MG/ML
INJECTION, SOLUTION INTRAVENOUS CONTINUOUS
Status: DISCONTINUED | OUTPATIENT
Start: 2020-01-01 | End: 2020-01-01 | Stop reason: HOSPADM

## 2020-01-01 RX ORDER — 0.9 % SODIUM CHLORIDE 0.9 %
1000 INTRAVENOUS SOLUTION INTRAVENOUS ONCE
Status: COMPLETED | OUTPATIENT
Start: 2020-01-01 | End: 2020-01-01

## 2020-01-01 RX ORDER — DEXTROSE MONOHYDRATE 50 MG/ML
INJECTION, SOLUTION INTRAVENOUS
Status: COMPLETED
Start: 2020-01-01 | End: 2020-01-01

## 2020-01-01 RX ORDER — ASPIRIN 81 MG/1
81 TABLET ORAL DAILY
Status: DISCONTINUED | OUTPATIENT
Start: 2020-01-01 | End: 2020-01-01 | Stop reason: ALTCHOICE

## 2020-01-01 RX ORDER — PROMETHAZINE HYDROCHLORIDE 25 MG/1
12.5 TABLET ORAL EVERY 6 HOURS PRN
Status: DISCONTINUED | OUTPATIENT
Start: 2020-01-01 | End: 2021-01-01 | Stop reason: HOSPADM

## 2020-01-01 RX ORDER — SODIUM CHLORIDE 9 MG/ML
INJECTION, SOLUTION INTRAVENOUS CONTINUOUS PRN
Status: DISCONTINUED | OUTPATIENT
Start: 2020-01-01 | End: 2020-01-01 | Stop reason: SDUPTHER

## 2020-01-01 RX ORDER — LIDOCAINE HYDROCHLORIDE 20 MG/ML
INJECTION, SOLUTION EPIDURAL; INFILTRATION; INTRACAUDAL; PERINEURAL PRN
Status: DISCONTINUED | OUTPATIENT
Start: 2020-01-01 | End: 2020-01-01 | Stop reason: SDUPTHER

## 2020-01-01 RX ORDER — CYCLOBENZAPRINE HCL 5 MG
TABLET ORAL
Qty: 270 TABLET | Refills: 0 | Status: SHIPPED | OUTPATIENT
Start: 2020-01-01 | End: 2020-01-01

## 2020-01-01 RX ORDER — HYDROCODONE BITARTRATE AND ACETAMINOPHEN 5; 325 MG/1; MG/1
1 TABLET ORAL EVERY 4 HOURS PRN
Status: DISCONTINUED | OUTPATIENT
Start: 2020-01-01 | End: 2021-01-01 | Stop reason: HOSPADM

## 2020-01-01 RX ORDER — LIDOCAINE HYDROCHLORIDE 20 MG/ML
INJECTION, SOLUTION INFILTRATION; PERINEURAL PRN
Status: DISCONTINUED | OUTPATIENT
Start: 2020-01-01 | End: 2020-01-01 | Stop reason: SDUPTHER

## 2020-01-01 RX ORDER — ETOMIDATE 2 MG/ML
INJECTION INTRAVENOUS PRN
Status: DISCONTINUED | OUTPATIENT
Start: 2020-01-01 | End: 2020-01-01 | Stop reason: SDUPTHER

## 2020-01-01 RX ORDER — LABETALOL HYDROCHLORIDE 5 MG/ML
5 INJECTION, SOLUTION INTRAVENOUS EVERY 10 MIN PRN
Status: DISCONTINUED | OUTPATIENT
Start: 2020-01-01 | End: 2020-01-01 | Stop reason: HOSPADM

## 2020-01-01 RX ORDER — DILTIAZEM HYDROCHLORIDE 60 MG/1
60 CAPSULE, EXTENDED RELEASE ORAL 2 TIMES DAILY
Status: DISCONTINUED | OUTPATIENT
Start: 2020-01-01 | End: 2021-01-01 | Stop reason: HOSPADM

## 2020-01-01 RX ORDER — POTASSIUM CHLORIDE 7.45 MG/ML
10 INJECTION INTRAVENOUS
Status: COMPLETED | OUTPATIENT
Start: 2020-01-01 | End: 2020-01-01

## 2020-01-01 RX ORDER — POTASSIUM CHLORIDE 20 MEQ/1
40 TABLET, EXTENDED RELEASE ORAL PRN
Status: DISCONTINUED | OUTPATIENT
Start: 2020-01-01 | End: 2020-01-01

## 2020-01-01 RX ORDER — INSULIN LISPRO 100 [IU]/ML
INJECTION, SOLUTION INTRAVENOUS; SUBCUTANEOUS
Qty: 15 ML | Refills: 3 | Status: SHIPPED | OUTPATIENT
Start: 2020-01-01

## 2020-01-01 RX ORDER — DEXTROSE MONOHYDRATE 50 MG/ML
100 INJECTION, SOLUTION INTRAVENOUS PRN
Status: DISCONTINUED | OUTPATIENT
Start: 2020-01-01 | End: 2021-01-01 | Stop reason: HOSPADM

## 2020-01-01 RX ORDER — DEXAMETHASONE SODIUM PHOSPHATE 4 MG/ML
INJECTION, SOLUTION INTRA-ARTICULAR; INTRALESIONAL; INTRAMUSCULAR; INTRAVENOUS; SOFT TISSUE PRN
Status: DISCONTINUED | OUTPATIENT
Start: 2020-01-01 | End: 2020-01-01 | Stop reason: SDUPTHER

## 2020-01-01 RX ORDER — HYDROCODONE BITARTRATE AND ACETAMINOPHEN 5; 325 MG/1; MG/1
1 TABLET ORAL EVERY 4 HOURS PRN
Qty: 15 TABLET | Refills: 0 | Status: SHIPPED | OUTPATIENT
Start: 2020-01-01 | End: 2020-01-01

## 2020-01-01 RX ORDER — FENTANYL CITRATE 50 UG/ML
50 INJECTION, SOLUTION INTRAMUSCULAR; INTRAVENOUS EVERY 5 MIN PRN
Status: DISCONTINUED | OUTPATIENT
Start: 2020-01-01 | End: 2020-01-01 | Stop reason: HOSPADM

## 2020-01-01 RX ORDER — TAMSULOSIN HYDROCHLORIDE 0.4 MG/1
0.4 CAPSULE ORAL 2 TIMES DAILY
Qty: 180 CAPSULE | Refills: 1 | Status: SHIPPED | OUTPATIENT
Start: 2020-01-01

## 2020-01-01 RX ORDER — INSULIN LISPRO 100 [IU]/ML
0-12 INJECTION, SOLUTION INTRAVENOUS; SUBCUTANEOUS
Status: DISCONTINUED | OUTPATIENT
Start: 2020-01-01 | End: 2020-01-01 | Stop reason: HOSPADM

## 2020-01-01 RX ORDER — POTASSIUM CHLORIDE 20 MEQ/1
20 TABLET, EXTENDED RELEASE ORAL 2 TIMES DAILY
Qty: 180 TABLET | Refills: 0 | Status: SHIPPED | OUTPATIENT
Start: 2020-01-01 | End: 2020-01-01

## 2020-01-01 RX ORDER — POTASSIUM CHLORIDE 7.45 MG/ML
10 INJECTION INTRAVENOUS PRN
Status: DISCONTINUED | OUTPATIENT
Start: 2020-01-01 | End: 2020-01-01

## 2020-01-01 RX ORDER — SODIUM CHLORIDE, SODIUM LACTATE, POTASSIUM CHLORIDE, CALCIUM CHLORIDE 600; 310; 30; 20 MG/100ML; MG/100ML; MG/100ML; MG/100ML
INJECTION, SOLUTION INTRAVENOUS CONTINUOUS PRN
Status: DISCONTINUED | OUTPATIENT
Start: 2020-01-01 | End: 2020-01-01 | Stop reason: SDUPTHER

## 2020-01-01 RX ORDER — PROPOFOL 10 MG/ML
INJECTION, EMULSION INTRAVENOUS CONTINUOUS PRN
Status: DISCONTINUED | OUTPATIENT
Start: 2020-01-01 | End: 2020-01-01 | Stop reason: SDUPTHER

## 2020-01-01 RX ORDER — DIPHENHYDRAMINE HYDROCHLORIDE 50 MG/ML
12.5 INJECTION INTRAMUSCULAR; INTRAVENOUS
Status: DISCONTINUED | OUTPATIENT
Start: 2020-01-01 | End: 2020-01-01 | Stop reason: HOSPADM

## 2020-01-01 RX ORDER — HYDROMORPHONE HCL 110MG/55ML
0.25 PATIENT CONTROLLED ANALGESIA SYRINGE INTRAVENOUS EVERY 5 MIN PRN
Status: DISCONTINUED | OUTPATIENT
Start: 2020-01-01 | End: 2020-01-01 | Stop reason: HOSPADM

## 2020-01-01 RX ORDER — SODIUM CHLORIDE 9 MG/ML
INJECTION, SOLUTION INTRAVENOUS
Status: DISPENSED
Start: 2020-01-01 | End: 2020-01-01

## 2020-01-01 RX ORDER — NICOTINE POLACRILEX 4 MG
15 LOZENGE BUCCAL PRN
Status: DISCONTINUED | OUTPATIENT
Start: 2020-01-01 | End: 2020-01-01 | Stop reason: HOSPADM

## 2020-01-01 RX ORDER — PANTOPRAZOLE SODIUM 40 MG/10ML
40 INJECTION, POWDER, LYOPHILIZED, FOR SOLUTION INTRAVENOUS EVERY 12 HOURS
Status: DISCONTINUED | OUTPATIENT
Start: 2020-01-01 | End: 2020-01-01 | Stop reason: HOSPADM

## 2020-01-01 RX ORDER — POLYETHYLENE GLYCOL 3350 17 G/17G
17 POWDER, FOR SOLUTION ORAL DAILY PRN
Status: DISCONTINUED | OUTPATIENT
Start: 2020-01-01 | End: 2021-01-01 | Stop reason: HOSPADM

## 2020-01-01 RX ORDER — ONDANSETRON 2 MG/ML
INJECTION INTRAMUSCULAR; INTRAVENOUS PRN
Status: DISCONTINUED | OUTPATIENT
Start: 2020-01-01 | End: 2020-01-01 | Stop reason: SDUPTHER

## 2020-01-01 RX ORDER — ALLOPURINOL 300 MG/1
TABLET ORAL
Qty: 90 TABLET | Refills: 1 | Status: ON HOLD
Start: 2020-01-01 | End: 2021-01-01 | Stop reason: HOSPADM

## 2020-01-01 RX ORDER — DILTIAZEM HYDROCHLORIDE 5 MG/ML
10 INJECTION INTRAVENOUS ONCE
Status: COMPLETED | OUTPATIENT
Start: 2020-01-01 | End: 2020-01-01

## 2020-01-01 RX ORDER — PROMETHAZINE HYDROCHLORIDE 25 MG/ML
6.25 INJECTION, SOLUTION INTRAMUSCULAR; INTRAVENOUS PRN
Status: DISCONTINUED | OUTPATIENT
Start: 2020-01-01 | End: 2020-01-01 | Stop reason: HOSPADM

## 2020-01-01 RX ORDER — POTASSIUM CHLORIDE 7.45 MG/ML
10 INJECTION INTRAVENOUS ONCE
Status: COMPLETED | OUTPATIENT
Start: 2020-01-01 | End: 2020-01-01

## 2020-01-01 RX ORDER — DEXTROSE MONOHYDRATE 25 G/50ML
12.5 INJECTION, SOLUTION INTRAVENOUS PRN
Status: DISCONTINUED | OUTPATIENT
Start: 2020-01-01 | End: 2021-01-01 | Stop reason: HOSPADM

## 2020-01-01 RX ORDER — TAMSULOSIN HYDROCHLORIDE 0.4 MG/1
0.4 CAPSULE ORAL 2 TIMES DAILY
Status: DISCONTINUED | OUTPATIENT
Start: 2020-01-01 | End: 2020-01-01 | Stop reason: HOSPADM

## 2020-01-01 RX ORDER — FUROSEMIDE 40 MG/1
40 TABLET ORAL DAILY
Status: DISCONTINUED | OUTPATIENT
Start: 2020-01-01 | End: 2021-01-01

## 2020-01-01 RX ORDER — ACETAMINOPHEN 650 MG/1
650 SUPPOSITORY RECTAL EVERY 6 HOURS PRN
Status: DISCONTINUED | OUTPATIENT
Start: 2020-01-01 | End: 2020-01-01 | Stop reason: HOSPADM

## 2020-01-01 RX ORDER — MEPERIDINE HYDROCHLORIDE 25 MG/ML
12.5 INJECTION INTRAMUSCULAR; INTRAVENOUS; SUBCUTANEOUS EVERY 5 MIN PRN
Status: DISCONTINUED | OUTPATIENT
Start: 2020-01-01 | End: 2020-01-01 | Stop reason: HOSPADM

## 2020-01-01 RX ORDER — FLUCONAZOLE 2 MG/ML
200 INJECTION, SOLUTION INTRAVENOUS EVERY 24 HOURS
Status: DISCONTINUED | OUTPATIENT
Start: 2020-01-01 | End: 2021-01-01 | Stop reason: HOSPADM

## 2020-01-01 RX ORDER — DEXTROSE AND SODIUM CHLORIDE 5; .9 G/100ML; G/100ML
INJECTION, SOLUTION INTRAVENOUS
Status: DISCONTINUED
Start: 2020-01-01 | End: 2020-01-01 | Stop reason: WASHOUT

## 2020-01-01 RX ORDER — POTASSIUM CHLORIDE 20 MEQ/1
20 TABLET, EXTENDED RELEASE ORAL 2 TIMES DAILY WITH MEALS
Status: DISCONTINUED | OUTPATIENT
Start: 2020-01-01 | End: 2020-01-01

## 2020-01-01 RX ORDER — CYCLOBENZAPRINE HCL 5 MG
TABLET ORAL
Qty: 270 TABLET | Refills: 0 | Status: SHIPPED | OUTPATIENT
Start: 2020-01-01

## 2020-01-01 RX ORDER — SODIUM CHLORIDE 9 MG/ML
INJECTION, SOLUTION INTRAVENOUS CONTINUOUS
Status: DISCONTINUED | OUTPATIENT
Start: 2020-01-01 | End: 2021-01-01

## 2020-01-01 RX ORDER — DEXTROSE MONOHYDRATE 25 G/50ML
12.5 INJECTION, SOLUTION INTRAVENOUS PRN
Status: DISCONTINUED | OUTPATIENT
Start: 2020-01-01 | End: 2020-01-01 | Stop reason: HOSPADM

## 2020-01-01 RX ORDER — POTASSIUM CHLORIDE 20 MEQ/1
40 TABLET, EXTENDED RELEASE ORAL 2 TIMES DAILY
Status: DISCONTINUED | OUTPATIENT
Start: 2020-01-01 | End: 2020-01-01

## 2020-01-01 RX ORDER — POTASSIUM CHLORIDE 7.45 MG/ML
10 INJECTION INTRAVENOUS PRN
Status: DISCONTINUED | OUTPATIENT
Start: 2020-01-01 | End: 2021-01-01 | Stop reason: HOSPADM

## 2020-01-01 RX ORDER — ACETAMINOPHEN 325 MG/1
650 TABLET ORAL EVERY 6 HOURS PRN
Status: DISCONTINUED | OUTPATIENT
Start: 2020-01-01 | End: 2021-01-01 | Stop reason: HOSPADM

## 2020-01-01 RX ORDER — FUROSEMIDE 40 MG/1
TABLET ORAL
Qty: 90 TABLET | Refills: 3 | Status: ON HOLD | OUTPATIENT
Start: 2020-01-01 | End: 2021-01-01 | Stop reason: HOSPADM

## 2020-01-01 RX ORDER — HYDROMORPHONE HCL 110MG/55ML
0.5 PATIENT CONTROLLED ANALGESIA SYRINGE INTRAVENOUS EVERY 5 MIN PRN
Status: DISCONTINUED | OUTPATIENT
Start: 2020-01-01 | End: 2020-01-01 | Stop reason: HOSPADM

## 2020-01-01 RX ORDER — PROMETHAZINE HYDROCHLORIDE 25 MG/1
12.5 TABLET ORAL EVERY 6 HOURS PRN
Status: DISCONTINUED | OUTPATIENT
Start: 2020-01-01 | End: 2020-01-01 | Stop reason: HOSPADM

## 2020-01-01 RX ORDER — FUROSEMIDE 40 MG/1
TABLET ORAL
Qty: 90 TABLET | Refills: 3 | OUTPATIENT
Start: 2020-01-01

## 2020-01-01 RX ORDER — HYDROMORPHONE HYDROCHLORIDE 1 MG/ML
0.5 INJECTION, SOLUTION INTRAMUSCULAR; INTRAVENOUS; SUBCUTANEOUS EVERY 4 HOURS PRN
Status: DISCONTINUED | OUTPATIENT
Start: 2020-01-01 | End: 2021-01-01 | Stop reason: HOSPADM

## 2020-01-01 RX ORDER — BUPIVACAINE HYDROCHLORIDE AND EPINEPHRINE 5; 5 MG/ML; UG/ML
INJECTION, SOLUTION PERINEURAL
Status: COMPLETED | OUTPATIENT
Start: 2020-01-01 | End: 2020-01-01

## 2020-01-01 RX ORDER — SODIUM CHLORIDE 0.9 % (FLUSH) 0.9 %
10 SYRINGE (ML) INJECTION PRN
Status: DISCONTINUED | OUTPATIENT
Start: 2020-01-01 | End: 2021-01-01 | Stop reason: HOSPADM

## 2020-01-01 RX ORDER — TAMSULOSIN HYDROCHLORIDE 0.4 MG/1
0.4 CAPSULE ORAL 2 TIMES DAILY
Status: DISCONTINUED | OUTPATIENT
Start: 2020-01-01 | End: 2021-01-01 | Stop reason: HOSPADM

## 2020-01-01 RX ORDER — ASPIRIN 81 MG/1
81 TABLET, CHEWABLE ORAL DAILY
Status: DISCONTINUED | OUTPATIENT
Start: 2020-01-01 | End: 2021-01-01 | Stop reason: HOSPADM

## 2020-01-01 RX ORDER — INSULIN LISPRO 100 [IU]/ML
0-6 INJECTION, SOLUTION INTRAVENOUS; SUBCUTANEOUS NIGHTLY
Status: DISCONTINUED | OUTPATIENT
Start: 2020-01-01 | End: 2021-01-01 | Stop reason: HOSPADM

## 2020-01-01 RX ORDER — ALLOPURINOL 300 MG/1
TABLET ORAL
Qty: 30 TABLET | Refills: 5 | OUTPATIENT
Start: 2020-01-01

## 2020-01-01 RX ADMIN — ACETAMINOPHEN 650 MG: 325 TABLET ORAL at 01:28

## 2020-01-01 RX ADMIN — ASPIRIN 81 MG: 81 TABLET, CHEWABLE ORAL at 08:47

## 2020-01-01 RX ADMIN — Medication 100 MCG: at 10:27

## 2020-01-01 RX ADMIN — DEXTROSE MONOHYDRATE 250 ML: 50 INJECTION, SOLUTION INTRAVENOUS at 08:06

## 2020-01-01 RX ADMIN — DILTIAZEM HYDROCHLORIDE 10 MG: 5 INJECTION INTRAVENOUS at 07:21

## 2020-01-01 RX ADMIN — DEXAMETHASONE SODIUM PHOSPHATE 4 MG: 4 INJECTION, SOLUTION INTRAMUSCULAR; INTRAVENOUS at 10:08

## 2020-01-01 RX ADMIN — SODIUM CHLORIDE: 9 INJECTION, SOLUTION INTRAVENOUS at 09:00

## 2020-01-01 RX ADMIN — DIPHENHYDRAMINE HYDROCHLORIDE 5 ML: 12.5 SOLUTION ORAL at 02:59

## 2020-01-01 RX ADMIN — SODIUM BICARBONATE: 84 INJECTION, SOLUTION INTRAVENOUS at 01:59

## 2020-01-01 RX ADMIN — POTASSIUM CHLORIDE 10 MEQ: 7.46 INJECTION, SOLUTION INTRAVENOUS at 01:56

## 2020-01-01 RX ADMIN — METOCLOPRAMIDE 10 MG: 5 INJECTION, SOLUTION INTRAMUSCULAR; INTRAVENOUS at 14:42

## 2020-01-01 RX ADMIN — ENOXAPARIN SODIUM 40 MG: 40 INJECTION SUBCUTANEOUS at 20:50

## 2020-01-01 RX ADMIN — SODIUM CHLORIDE, POTASSIUM CHLORIDE, SODIUM LACTATE AND CALCIUM CHLORIDE 2000 ML: 600; 310; 30; 20 INJECTION, SOLUTION INTRAVENOUS at 14:15

## 2020-01-01 RX ADMIN — POTASSIUM BICARBONATE 40 MEQ: 782 TABLET, EFFERVESCENT ORAL at 22:07

## 2020-01-01 RX ADMIN — DIPHENHYDRAMINE HYDROCHLORIDE 5 ML: 12.5 SOLUTION ORAL at 10:57

## 2020-01-01 RX ADMIN — POTASSIUM CHLORIDE 10 MEQ: 7.46 INJECTION, SOLUTION INTRAVENOUS at 02:33

## 2020-01-01 RX ADMIN — IOHEXOL 50 ML: 240 INJECTION, SOLUTION INTRATHECAL; INTRAVASCULAR; INTRAVENOUS; ORAL at 09:50

## 2020-01-01 RX ADMIN — SODIUM CHLORIDE: 9 INJECTION, SOLUTION INTRAVENOUS at 09:56

## 2020-01-01 RX ADMIN — LIDOCAINE HYDROCHLORIDE 100 MG: 20 INJECTION, SOLUTION INFILTRATION; PERINEURAL at 10:00

## 2020-01-01 RX ADMIN — POTASSIUM CHLORIDE 10 MEQ: 7.46 INJECTION, SOLUTION INTRAVENOUS at 15:46

## 2020-01-01 RX ADMIN — Medication 100 MCG: at 10:19

## 2020-01-01 RX ADMIN — FLUCONAZOLE 200 MG: 2 INJECTION, SOLUTION INTRAVENOUS at 17:04

## 2020-01-01 RX ADMIN — DIPHENOXYLATE HYDROCHLORIDE AND ATROPINE SULFATE 1 TABLET: 2.5; .025 TABLET ORAL at 16:11

## 2020-01-01 RX ADMIN — INSULIN LISPRO 2 UNITS: 100 INJECTION, SOLUTION INTRAVENOUS; SUBCUTANEOUS at 07:54

## 2020-01-01 RX ADMIN — FUROSEMIDE 40 MG: 40 TABLET ORAL at 08:47

## 2020-01-01 RX ADMIN — IOPAMIDOL 75 ML: 755 INJECTION, SOLUTION INTRAVENOUS at 10:50

## 2020-01-01 RX ADMIN — SODIUM CHLORIDE: 9 INJECTION, SOLUTION INTRAVENOUS at 02:17

## 2020-01-01 RX ADMIN — Medication 100 MCG: at 10:20

## 2020-01-01 RX ADMIN — POTASSIUM CHLORIDE 10 MEQ: 7.46 INJECTION, SOLUTION INTRAVENOUS at 00:09

## 2020-01-01 RX ADMIN — OCTREOTIDE ACETATE 100 MCG: 100 INJECTION, SOLUTION INTRAVENOUS; SUBCUTANEOUS at 08:50

## 2020-01-01 RX ADMIN — INSULIN GLARGINE 28 UNITS: 100 INJECTION, SOLUTION SUBCUTANEOUS at 10:07

## 2020-01-01 RX ADMIN — TAMSULOSIN HYDROCHLORIDE 0.4 MG: 0.4 CAPSULE ORAL at 07:48

## 2020-01-01 RX ADMIN — DIPHENOXYLATE HYDROCHLORIDE AND ATROPINE SULFATE 1 TABLET: 2.5; .025 TABLET ORAL at 07:49

## 2020-01-01 RX ADMIN — PHENYLEPHRINE HYDROCHLORIDE 100 MCG: 10 INJECTION INTRAVENOUS at 10:04

## 2020-01-01 RX ADMIN — DIGOXIN 250 MCG: 0.25 INJECTION INTRAMUSCULAR; INTRAVENOUS at 15:34

## 2020-01-01 RX ADMIN — FLUCONAZOLE 200 MG: 2 INJECTION, SOLUTION INTRAVENOUS at 17:09

## 2020-01-01 RX ADMIN — SODIUM CHLORIDE 10 ML: 9 INJECTION INTRAMUSCULAR; INTRAVENOUS; SUBCUTANEOUS at 02:18

## 2020-01-01 RX ADMIN — Medication 100 MCG: at 10:08

## 2020-01-01 RX ADMIN — POTASSIUM CHLORIDE 10 MEQ: 7.46 INJECTION, SOLUTION INTRAVENOUS at 03:11

## 2020-01-01 RX ADMIN — INSULIN LISPRO 1 UNITS: 100 INJECTION, SOLUTION INTRAVENOUS; SUBCUTANEOUS at 20:53

## 2020-01-01 RX ADMIN — DILTIAZEM HYDROCHLORIDE 30 MG: 30 TABLET, FILM COATED ORAL at 23:04

## 2020-01-01 RX ADMIN — SODIUM BICARBONATE: 84 INJECTION, SOLUTION INTRAVENOUS at 14:16

## 2020-01-01 RX ADMIN — HYDROMORPHONE HYDROCHLORIDE 0.5 MG: 1 INJECTION, SOLUTION INTRAMUSCULAR; INTRAVENOUS; SUBCUTANEOUS at 20:48

## 2020-01-01 RX ADMIN — DILTIAZEM HYDROCHLORIDE 30 MG: 30 TABLET, FILM COATED ORAL at 06:40

## 2020-01-01 RX ADMIN — PROPOFOL 40 MG: 10 INJECTION, EMULSION INTRAVENOUS at 10:02

## 2020-01-01 RX ADMIN — MIDAZOLAM 1 MG: 1 INJECTION INTRAMUSCULAR; INTRAVENOUS at 09:52

## 2020-01-01 RX ADMIN — Medication 10 ML: at 22:08

## 2020-01-01 RX ADMIN — ENOXAPARIN SODIUM 40 MG: 40 INJECTION SUBCUTANEOUS at 07:48

## 2020-01-01 RX ADMIN — Medication 100 MCG: at 10:10

## 2020-01-01 RX ADMIN — POTASSIUM CHLORIDE 10 MEQ: 7.46 INJECTION, SOLUTION INTRAVENOUS at 21:55

## 2020-01-01 RX ADMIN — POTASSIUM CHLORIDE 10 MEQ: 7.46 INJECTION, SOLUTION INTRAVENOUS at 20:48

## 2020-01-01 RX ADMIN — LIDOCAINE HYDROCHLORIDE 100 MG: 20 INJECTION, SOLUTION EPIDURAL; INFILTRATION; INTRACAUDAL; PERINEURAL at 10:00

## 2020-01-01 RX ADMIN — SODIUM CHLORIDE: 9 INJECTION, SOLUTION INTRAVENOUS at 05:02

## 2020-01-01 RX ADMIN — TAMSULOSIN HYDROCHLORIDE 0.4 MG: 0.4 CAPSULE ORAL at 08:46

## 2020-01-01 RX ADMIN — ASPIRIN 81 MG: 81 TABLET, CHEWABLE ORAL at 07:49

## 2020-01-01 RX ADMIN — SODIUM CHLORIDE 1000 ML: 9 INJECTION, SOLUTION INTRAVENOUS at 18:13

## 2020-01-01 RX ADMIN — ONDANSETRON 4 MG: 2 INJECTION INTRAMUSCULAR; INTRAVENOUS at 13:40

## 2020-01-01 RX ADMIN — IOPAMIDOL 100 ML: 755 INJECTION, SOLUTION INTRAVENOUS at 19:18

## 2020-01-01 RX ADMIN — HYDROMORPHONE HYDROCHLORIDE 0.5 MG: 1 INJECTION, SOLUTION INTRAMUSCULAR; INTRAVENOUS; SUBCUTANEOUS at 12:29

## 2020-01-01 RX ADMIN — Medication 10 ML: at 08:00

## 2020-01-01 RX ADMIN — TAMSULOSIN HYDROCHLORIDE 0.4 MG: 0.4 CAPSULE ORAL at 22:08

## 2020-01-01 RX ADMIN — DIPHENHYDRAMINE HYDROCHLORIDE 25 MG: 50 INJECTION, SOLUTION INTRAMUSCULAR; INTRAVENOUS at 14:42

## 2020-01-01 RX ADMIN — MAGNESIUM SULFATE IN WATER 2 G: 40 INJECTION, SOLUTION INTRAVENOUS at 07:57

## 2020-01-01 RX ADMIN — SODIUM CHLORIDE: 9 INJECTION, SOLUTION INTRAVENOUS at 09:30

## 2020-01-01 RX ADMIN — POTASSIUM BICARBONATE 40 MEQ: 782 TABLET, EFFERVESCENT ORAL at 08:48

## 2020-01-01 RX ADMIN — INSULIN GLARGINE 28 UNITS: 100 INJECTION, SOLUTION SUBCUTANEOUS at 07:54

## 2020-01-01 RX ADMIN — INSULIN LISPRO 3 UNITS: 100 INJECTION, SOLUTION INTRAVENOUS; SUBCUTANEOUS at 20:49

## 2020-01-01 RX ADMIN — HYDROMORPHONE HYDROCHLORIDE 0.5 MG: 1 INJECTION, SOLUTION INTRAMUSCULAR; INTRAVENOUS; SUBCUTANEOUS at 15:31

## 2020-01-01 RX ADMIN — SODIUM BICARBONATE: 84 INJECTION, SOLUTION INTRAVENOUS at 15:38

## 2020-01-01 RX ADMIN — DEXTROSE MONOHYDRATE 12.5 G: 25 INJECTION, SOLUTION INTRAVENOUS at 07:51

## 2020-01-01 RX ADMIN — POTASSIUM CHLORIDE 10 MEQ: 7.46 INJECTION, SOLUTION INTRAVENOUS at 15:19

## 2020-01-01 RX ADMIN — INSULIN LISPRO 4 UNITS: 100 INJECTION, SOLUTION INTRAVENOUS; SUBCUTANEOUS at 17:22

## 2020-01-01 RX ADMIN — ONDANSETRON 4 MG: 2 INJECTION INTRAMUSCULAR; INTRAVENOUS at 10:44

## 2020-01-01 RX ADMIN — Medication 100 MCG: at 10:23

## 2020-01-01 RX ADMIN — DILTIAZEM HYDROCHLORIDE 30 MG: 30 TABLET, FILM COATED ORAL at 11:41

## 2020-01-01 RX ADMIN — DILTIAZEM HYDROCHLORIDE 30 MG: 30 TABLET, FILM COATED ORAL at 17:06

## 2020-01-01 RX ADMIN — POTASSIUM CHLORIDE 10 MEQ: 7.46 INJECTION, SOLUTION INTRAVENOUS at 23:04

## 2020-01-01 RX ADMIN — INSULIN LISPRO 4 UNITS: 100 INJECTION, SOLUTION INTRAVENOUS; SUBCUTANEOUS at 08:50

## 2020-01-01 RX ADMIN — PROPOFOL 40 MG: 10 INJECTION, EMULSION INTRAVENOUS at 10:01

## 2020-01-01 RX ADMIN — CLINDAMYCIN IN 5 PERCENT DEXTROSE 900 MG: 18 INJECTION, SOLUTION INTRAVENOUS at 09:52

## 2020-01-01 RX ADMIN — PHENYLEPHRINE HYDROCHLORIDE 100 MCG: 10 INJECTION INTRAVENOUS at 10:08

## 2020-01-01 RX ADMIN — Medication 100 MCG: at 10:24

## 2020-01-01 RX ADMIN — ENOXAPARIN SODIUM 40 MG: 40 INJECTION SUBCUTANEOUS at 08:48

## 2020-01-01 RX ADMIN — Medication 100 MCG: at 10:22

## 2020-01-01 RX ADMIN — PANTOPRAZOLE SODIUM 40 MG: 40 INJECTION, POWDER, FOR SOLUTION INTRAVENOUS at 13:40

## 2020-01-01 RX ADMIN — POTASSIUM CHLORIDE 10 MEQ: 7.46 INJECTION, SOLUTION INTRAVENOUS at 23:59

## 2020-01-01 RX ADMIN — DILTIAZEM HYDROCHLORIDE 5 MG/HR: 5 INJECTION INTRAVENOUS at 07:28

## 2020-01-01 RX ADMIN — POTASSIUM CHLORIDE 10 MEQ: 7.46 INJECTION, SOLUTION INTRAVENOUS at 01:20

## 2020-01-01 RX ADMIN — POTASSIUM BICARBONATE 40 MEQ: 782 TABLET, EFFERVESCENT ORAL at 18:54

## 2020-01-01 RX ADMIN — FUROSEMIDE 40 MG: 40 TABLET ORAL at 07:49

## 2020-01-01 RX ADMIN — SODIUM CHLORIDE, POTASSIUM CHLORIDE, SODIUM LACTATE AND CALCIUM CHLORIDE: 600; 310; 30; 20 INJECTION, SOLUTION INTRAVENOUS at 10:38

## 2020-01-01 RX ADMIN — INSULIN LISPRO 2 UNITS: 100 INJECTION, SOLUTION INTRAVENOUS; SUBCUTANEOUS at 12:32

## 2020-01-01 RX ADMIN — TAMSULOSIN HYDROCHLORIDE 0.4 MG: 0.4 CAPSULE ORAL at 20:48

## 2020-01-01 RX ADMIN — HYDROMORPHONE HYDROCHLORIDE 0.5 MG: 1 INJECTION, SOLUTION INTRAMUSCULAR; INTRAVENOUS; SUBCUTANEOUS at 08:49

## 2020-01-01 RX ADMIN — DIPHENOXYLATE HYDROCHLORIDE AND ATROPINE SULFATE 1 TABLET: 2.5; .025 TABLET ORAL at 11:41

## 2020-01-01 RX ADMIN — HYDROCODONE BITARTRATE AND ACETAMINOPHEN 1 TABLET: 5; 325 TABLET ORAL at 00:43

## 2020-01-01 RX ADMIN — DILTIAZEM HYDROCHLORIDE 30 MG: 30 TABLET, FILM COATED ORAL at 11:22

## 2020-01-01 RX ADMIN — POTASSIUM CHLORIDE 10 MEQ: 7.46 INJECTION, SOLUTION INTRAVENOUS at 16:05

## 2020-01-01 RX ADMIN — OCTREOTIDE ACETATE 100 MCG: 100 INJECTION, SOLUTION INTRAVENOUS; SUBCUTANEOUS at 17:07

## 2020-01-01 RX ADMIN — INSULIN LISPRO 2 UNITS: 100 INJECTION, SOLUTION INTRAVENOUS; SUBCUTANEOUS at 11:42

## 2020-01-01 RX ADMIN — POTASSIUM CHLORIDE 10 MEQ: 7.46 INJECTION, SOLUTION INTRAVENOUS at 03:26

## 2020-01-01 RX ADMIN — Medication 100 MCG: at 10:17

## 2020-01-01 RX ADMIN — DIPHENHYDRAMINE HYDROCHLORIDE 5 ML: 12.5 SOLUTION ORAL at 08:51

## 2020-01-01 RX ADMIN — Medication 100 MCG: at 10:25

## 2020-01-01 RX ADMIN — PROPOFOL 30 MG: 10 INJECTION, EMULSION INTRAVENOUS at 10:00

## 2020-01-01 RX ADMIN — Medication 10 ML: at 00:05

## 2020-01-01 RX ADMIN — ETOMIDATE 10 MG: 20 INJECTION, SOLUTION INTRAVENOUS at 10:01

## 2020-01-01 RX ADMIN — PROPOFOL 140 MCG/KG/MIN: 10 INJECTION, EMULSION INTRAVENOUS at 10:00

## 2020-01-01 RX ADMIN — SODIUM CHLORIDE 10 ML: 9 INJECTION INTRAMUSCULAR; INTRAVENOUS; SUBCUTANEOUS at 13:40

## 2020-01-01 RX ADMIN — PHENYLEPHRINE HYDROCHLORIDE 60 MCG/MIN: 10 INJECTION INTRAVENOUS at 10:29

## 2020-01-01 RX ADMIN — Medication 10 ML: at 20:53

## 2020-01-01 RX ADMIN — POTASSIUM CHLORIDE 10 MEQ: 7.46 INJECTION, SOLUTION INTRAVENOUS at 12:10

## 2020-01-01 RX ADMIN — HYDROMORPHONE HYDROCHLORIDE 0.5 MG: 1 INJECTION, SOLUTION INTRAMUSCULAR; INTRAVENOUS; SUBCUTANEOUS at 00:25

## 2020-01-01 RX ADMIN — Medication 100 MCG: at 10:15

## 2020-01-01 RX ADMIN — POTASSIUM CHLORIDE 10 MEQ: 7.46 INJECTION, SOLUTION INTRAVENOUS at 00:43

## 2020-01-01 RX ADMIN — POTASSIUM CHLORIDE 10 MEQ: 7.46 INJECTION, SOLUTION INTRAVENOUS at 04:35

## 2020-01-01 RX ADMIN — TAMSULOSIN HYDROCHLORIDE 0.4 MG: 0.4 CAPSULE ORAL at 20:50

## 2020-01-01 RX ADMIN — POTASSIUM CHLORIDE 10 MEQ: 7.46 INJECTION, SOLUTION INTRAVENOUS at 14:12

## 2020-01-01 ASSESSMENT — PAIN SCALES - GENERAL
PAINLEVEL_OUTOF10: 0
PAINLEVEL_OUTOF10: 3
PAINLEVEL_OUTOF10: 7
PAINLEVEL_OUTOF10: 5
PAINLEVEL_OUTOF10: 4
PAINLEVEL_OUTOF10: 5
PAINLEVEL_OUTOF10: 0
PAINLEVEL_OUTOF10: 0
PAINLEVEL_OUTOF10: 6
PAINLEVEL_OUTOF10: 0
PAINLEVEL_OUTOF10: 0

## 2020-01-01 ASSESSMENT — PAIN DESCRIPTION - DESCRIPTORS
DESCRIPTORS: SORE

## 2020-01-01 ASSESSMENT — PULMONARY FUNCTION TESTS
PIF_VALUE: 13
PIF_VALUE: 19
PIF_VALUE: 19
PIF_VALUE: 14
PIF_VALUE: 1
PIF_VALUE: 10
PIF_VALUE: 13
PIF_VALUE: 1
PIF_VALUE: 19
PIF_VALUE: 1
PIF_VALUE: 18
PIF_VALUE: 13
PIF_VALUE: 14
PIF_VALUE: 1
PIF_VALUE: 14
PIF_VALUE: 16
PIF_VALUE: 19
PIF_VALUE: 19
PIF_VALUE: 18
PIF_VALUE: 19
PIF_VALUE: 19
PIF_VALUE: 1
PIF_VALUE: 18
PIF_VALUE: 18
PIF_VALUE: 13
PIF_VALUE: 19
PIF_VALUE: 13
PIF_VALUE: 19
PIF_VALUE: 1
PIF_VALUE: 1
PIF_VALUE: 18
PIF_VALUE: 19
PIF_VALUE: 1
PIF_VALUE: 1
PIF_VALUE: 17
PIF_VALUE: 18
PIF_VALUE: 1
PIF_VALUE: 0
PIF_VALUE: 19
PIF_VALUE: 1
PIF_VALUE: 13
PIF_VALUE: 23
PIF_VALUE: 18
PIF_VALUE: 2
PIF_VALUE: 1
PIF_VALUE: 1
PIF_VALUE: 13
PIF_VALUE: 1
PIF_VALUE: 2
PIF_VALUE: 34
PIF_VALUE: 19
PIF_VALUE: 1
PIF_VALUE: 9
PIF_VALUE: 1
PIF_VALUE: 18
PIF_VALUE: 19
PIF_VALUE: 1
PIF_VALUE: 18
PIF_VALUE: 19
PIF_VALUE: 19
PIF_VALUE: 1
PIF_VALUE: 1
PIF_VALUE: 0
PIF_VALUE: 18
PIF_VALUE: 0
PIF_VALUE: 1
PIF_VALUE: 19
PIF_VALUE: 19
PIF_VALUE: 13
PIF_VALUE: 18

## 2020-01-01 ASSESSMENT — ENCOUNTER SYMPTOMS
COUGH: 0
ABDOMINAL PAIN: 0
SHORTNESS OF BREATH: 0
SHORTNESS OF BREATH: 0
VOMITING: 0
VOMITING: 0
ABDOMINAL PAIN: 0
DIARRHEA: 0
COUGH: 0
BLOOD IN STOOL: 1
RHINORRHEA: 0
NAUSEA: 0
NAUSEA: 0
DIARRHEA: 1
RHINORRHEA: 0

## 2020-01-01 ASSESSMENT — PAIN DESCRIPTION - PAIN TYPE
TYPE: ACUTE PAIN

## 2020-01-01 ASSESSMENT — PAIN DESCRIPTION - PROGRESSION
CLINICAL_PROGRESSION: NOT CHANGED
CLINICAL_PROGRESSION: GRADUALLY IMPROVING

## 2020-01-01 ASSESSMENT — PAIN DESCRIPTION - LOCATION
LOCATION: THROAT
LOCATION: THROAT
LOCATION: LEG
LOCATION: THROAT
LOCATION: THROAT

## 2020-01-01 ASSESSMENT — PAIN DESCRIPTION - ORIENTATION
ORIENTATION: RIGHT

## 2020-01-01 ASSESSMENT — PAIN DESCRIPTION - FREQUENCY: FREQUENCY: INTERMITTENT

## 2020-01-01 ASSESSMENT — PAIN DESCRIPTION - ONSET
ONSET: ON-GOING

## 2020-01-01 ASSESSMENT — PAIN - FUNCTIONAL ASSESSMENT: PAIN_FUNCTIONAL_ASSESSMENT: 0-10

## 2020-01-09 RX ORDER — IBUPROFEN 800 MG/1
TABLET ORAL
Qty: 90 TABLET | Refills: 2 | Status: SHIPPED | OUTPATIENT
Start: 2020-01-09 | End: 2020-01-01 | Stop reason: SDUPTHER

## 2020-01-16 RX ORDER — ALLOPURINOL 300 MG/1
TABLET ORAL
Qty: 30 TABLET | Refills: 0 | Status: SHIPPED | OUTPATIENT
Start: 2020-01-16 | End: 2020-02-14

## 2020-01-24 RX ORDER — BLOOD-GLUCOSE METER
1 KIT MISCELLANEOUS 3 TIMES DAILY
Qty: 1 DEVICE | Refills: 0 | Status: SHIPPED | OUTPATIENT
Start: 2020-01-24 | End: 2020-04-29 | Stop reason: SDUPTHER

## 2020-01-31 ENCOUNTER — TELEPHONE (OUTPATIENT)
Dept: FAMILY MEDICINE CLINIC | Age: 60
End: 2020-01-31

## 2020-01-31 NOTE — TELEPHONE ENCOUNTER
Patient wants all his future refills sent to [address] [place] [address], [place] - [address] Gracieladee Rula [phone] - F [phone]      No longer wants to use [facility] due to med costs.       Provider out of office        Please advise

## 2020-02-14 RX ORDER — ALLOPURINOL 300 MG/1
TABLET ORAL
Qty: 30 TABLET | Refills: 0 | Status: SHIPPED | OUTPATIENT
Start: 2020-02-14 | End: 2020-03-11

## 2020-02-14 NOTE — TELEPHONE ENCOUNTER
Please send to pt's local pharmacy. If med is rejected by insurance we will start PA or will switch to suggested alterative.  Pt advise and has appt schedule for 03/02/2020

## 2020-02-24 ENCOUNTER — TELEPHONE (OUTPATIENT)
Dept: FAMILY MEDICINE CLINIC | Age: 60
End: 2020-02-24

## 2020-02-24 NOTE — TELEPHONE ENCOUNTER
Patient states he is still having trouble getting his long lasting insulin. He was told by pharmacy the insulin glargine (LANTUS) 100 UNIT/ML injection pen was not covered. They will not tell him which ones are covered. He wants the provider to call [facility] to find out which long lasting insulins are covered.       Provider out of office      Please advise

## 2020-02-24 NOTE — TELEPHONE ENCOUNTER
Called [facility] and they stated that pt's med is in formulary but pt has a co-payment of $94.40 due to his deductible. They stated that a 90 day supply would be $200.    Pt advise as I also call the pharmacy and they states that his medication is ready for p/u  Pt advise

## 2020-03-02 ENCOUNTER — OFFICE VISIT (OUTPATIENT)
Dept: FAMILY MEDICINE CLINIC | Age: 60
End: 2020-03-02
Payer: COMMERCIAL

## 2020-03-02 VITALS
HEIGHT: 67 IN | RESPIRATION RATE: 12 BRPM | WEIGHT: 226.13 LBS | DIASTOLIC BLOOD PRESSURE: 68 MMHG | BODY MASS INDEX: 35.49 KG/M2 | HEART RATE: 94 BPM | SYSTOLIC BLOOD PRESSURE: 102 MMHG | OXYGEN SATURATION: 99 %

## 2020-03-02 PROBLEM — R60.0 LOCALIZED EDEMA: Status: ACTIVE | Noted: 2020-03-02

## 2020-03-02 PROCEDURE — 1036F TOBACCO NON-USER: CPT | Performed by: FAMILY MEDICINE

## 2020-03-02 PROCEDURE — 3046F HEMOGLOBIN A1C LEVEL >9.0%: CPT | Performed by: FAMILY MEDICINE

## 2020-03-02 PROCEDURE — 3017F COLORECTAL CA SCREEN DOC REV: CPT | Performed by: FAMILY MEDICINE

## 2020-03-02 PROCEDURE — G8427 DOCREV CUR MEDS BY ELIG CLIN: HCPCS | Performed by: FAMILY MEDICINE

## 2020-03-02 PROCEDURE — 2022F DILAT RTA XM EVC RTNOPTHY: CPT | Performed by: FAMILY MEDICINE

## 2020-03-02 PROCEDURE — G8417 CALC BMI ABV UP PARAM F/U: HCPCS | Performed by: FAMILY MEDICINE

## 2020-03-02 PROCEDURE — G8484 FLU IMMUNIZE NO ADMIN: HCPCS | Performed by: FAMILY MEDICINE

## 2020-03-02 PROCEDURE — 99214 OFFICE O/P EST MOD 30 MIN: CPT | Performed by: FAMILY MEDICINE

## 2020-03-02 RX ORDER — FUROSEMIDE 40 MG/1
TABLET ORAL
Qty: 30 TABLET | Refills: 5 | Status: SHIPPED | OUTPATIENT
Start: 2020-03-02 | End: 2020-04-10 | Stop reason: SDUPTHER

## 2020-03-02 RX ORDER — POTASSIUM CHLORIDE 20 MEQ/1
20 TABLET, EXTENDED RELEASE ORAL 2 TIMES DAILY
Qty: 60 TABLET | Refills: 5 | Status: SHIPPED | OUTPATIENT
Start: 2020-03-02 | End: 2020-01-01 | Stop reason: SDUPTHER

## 2020-03-02 ASSESSMENT — PATIENT HEALTH QUESTIONNAIRE - PHQ9
1. LITTLE INTEREST OR PLEASURE IN DOING THINGS: 0
SUM OF ALL RESPONSES TO PHQ9 QUESTIONS 1 & 2: 0
SUM OF ALL RESPONSES TO PHQ QUESTIONS 1-9: 0
SUM OF ALL RESPONSES TO PHQ QUESTIONS 1-9: 0
2. FEELING DOWN, DEPRESSED OR HOPELESS: 0

## 2020-03-09 ENCOUNTER — HOSPITAL ENCOUNTER (OUTPATIENT)
Age: 60
Discharge: HOME | End: 2020-03-09
Payer: COMMERCIAL

## 2020-03-09 ENCOUNTER — HOSPITAL ENCOUNTER (OUTPATIENT)
Dept: GENERAL RADIOLOGY | Age: 60
Discharge: HOME | End: 2020-03-09
Payer: COMMERCIAL

## 2020-03-09 PROCEDURE — 74018 RADEX ABDOMEN 1 VIEW: CPT

## 2020-03-09 PROCEDURE — 36415 COLL VENOUS BLD VENIPUNCTURE: CPT

## 2020-03-09 PROCEDURE — 83036 HEMOGLOBIN GLYCOSYLATED A1C: CPT

## 2020-03-10 LAB
ESTIMATED AVERAGE GLUCOSE: 240.3 MG/DL
HBA1C MFR BLD: 10 %

## 2020-03-11 RX ORDER — ALLOPURINOL 300 MG/1
TABLET ORAL
Qty: 30 TABLET | Refills: 5 | Status: SHIPPED | OUTPATIENT
Start: 2020-03-11 | End: 2020-01-01 | Stop reason: SDUPTHER

## 2020-03-16 RX ORDER — FUROSEMIDE 20 MG/1
TABLET ORAL
Qty: 30 TABLET | Refills: 5 | Status: SHIPPED | OUTPATIENT
Start: 2020-03-16 | End: 2020-01-01

## 2020-04-10 ENCOUNTER — TELEPHONE (OUTPATIENT)
Dept: FAMILY MEDICINE CLINIC | Age: 60
End: 2020-04-10

## 2020-04-10 RX ORDER — FUROSEMIDE 40 MG/1
TABLET ORAL
Qty: 30 TABLET | Refills: 5 | Status: SHIPPED | OUTPATIENT
Start: 2020-04-10 | End: 2020-01-01 | Stop reason: SDUPTHER

## 2020-04-29 RX ORDER — BLOOD-GLUCOSE METER
1 KIT MISCELLANEOUS 3 TIMES DAILY
Qty: 1 DEVICE | Refills: 0 | Status: ON HOLD | OUTPATIENT
Start: 2020-04-29 | End: 2021-01-01 | Stop reason: HOSPADM

## 2020-04-29 RX ORDER — BLOOD-GLUCOSE METER
1 KIT MISCELLANEOUS 3 TIMES DAILY
Qty: 300 EACH | Refills: 3 | Status: SHIPPED | OUTPATIENT
Start: 2020-04-29

## 2020-04-29 RX ORDER — LANCETS 28 GAUGE
1 EACH MISCELLANEOUS DAILY
Qty: 100 EACH | Refills: 3 | Status: ON HOLD | OUTPATIENT
Start: 2020-04-29 | End: 2021-01-01 | Stop reason: HOSPADM

## 2020-04-30 ENCOUNTER — HOSPITAL ENCOUNTER (OUTPATIENT)
Dept: CT IMAGING | Age: 60
Discharge: HOME | End: 2020-04-30
Payer: COMMERCIAL

## 2020-04-30 ENCOUNTER — HOSPITAL ENCOUNTER (OUTPATIENT)
Age: 60
Discharge: HOME | End: 2020-04-30
Payer: COMMERCIAL

## 2020-04-30 LAB
A/G RATIO: 1.5 (ref 1.1–2.2)
ALBUMIN SERPL-MCNC: 4 G/DL (ref 3.4–5)
ALP BLD-CCNC: 181 U/L (ref 40–129)
ALT SERPL-CCNC: 23 U/L (ref 10–40)
ANION GAP SERPL CALCULATED.3IONS-SCNC: 9 MMOL/L (ref 3–16)
AST SERPL-CCNC: 18 U/L (ref 15–37)
BILIRUB SERPL-MCNC: 0.4 MG/DL (ref 0–1)
BUN BLDV-MCNC: 14 MG/DL (ref 7–20)
CALCIUM SERPL-MCNC: 9.2 MG/DL (ref 8.3–10.6)
CHLORIDE BLD-SCNC: 102 MMOL/L (ref 99–110)
CO2: 29 MMOL/L (ref 21–32)
CREAT SERPL-MCNC: 0.9 MG/DL (ref 0.8–1.3)
GFR AFRICAN AMERICAN: >60
GFR NON-AFRICAN AMERICAN: >60
GLOBULIN: 2.6 G/DL
GLUCOSE BLD-MCNC: 288 MG/DL (ref 70–99)
POTASSIUM SERPL-SCNC: 4.5 MMOL/L (ref 3.5–5.1)
SODIUM BLD-SCNC: 140 MMOL/L (ref 136–145)
TOTAL PROTEIN: 6.6 G/DL (ref 6.4–8.2)

## 2020-04-30 PROCEDURE — 6360000004 HC RX CONTRAST MEDICATION: Performed by: INTERNAL MEDICINE

## 2020-04-30 PROCEDURE — 80053 COMPREHEN METABOLIC PANEL: CPT

## 2020-04-30 PROCEDURE — 74177 CT ABD & PELVIS W/CONTRAST: CPT

## 2020-04-30 PROCEDURE — 36415 COLL VENOUS BLD VENIPUNCTURE: CPT

## 2020-04-30 RX ADMIN — IOPAMIDOL 75 ML: 755 INJECTION, SOLUTION INTRAVENOUS at 13:09

## 2020-04-30 RX ADMIN — IOHEXOL 50 ML: 240 INJECTION, SOLUTION INTRATHECAL; INTRAVASCULAR; INTRAVENOUS; ORAL at 13:09

## 2020-08-10 NOTE — TELEPHONE ENCOUNTER
[facility] received a call from:     Name of Caller: Diann    Relationship to patient:daughter    Organization name: na    Best contact number:[phone]     Reason for call: Pt is faxing over [place] paperwork for her work for [employer] to care for pt

## 2020-08-18 NOTE — TELEPHONE ENCOUNTER
Patient daughter Gianni Richards is calling again, about her [facility] papers. She would like a callback concerning this matter. Tried to  Erasmo Newton but she stated we do not know what we are doing and this should have been done by now.      Gianni Richards can be reached @[phone]

## 2020-08-18 NOTE — TELEPHONE ENCOUNTER
Forms were faxed to her office August 11 and completed today.   Duane Meléndez needs to understand that these forms are typically filled out in a timely manner and in her father's case, he is receiving his care per oncology and therefore the oncologist should typically complete these forms for her in the future

## 2020-09-02 PROBLEM — C77.2 SECONDARY AND UNSPECIFIED MALIGNANT NEOPLASM OF INTRA-ABDOMINAL LYMPH NODES (HCC): Status: ACTIVE | Noted: 2020-01-01
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
## 2020-10-19 NOTE — TELEPHONE ENCOUNTER
I rec'd a call from Dr. Lina Worrell today in the OR, he talked to Dr. Elaine Arias about pt that is having issues with his port that was placed by IR. Needs to have the port removed and replaced on the other side. I called and left a message for pt to call the office back.

## 2020-10-20 NOTE — TELEPHONE ENCOUNTER
Pt called back and scheduled port removal and port placement for 11/2. Wanted it this day due to chemo treatments are every other week.

## 2020-10-28 NOTE — PATIENT INSTRUCTIONS

## 2020-10-30 NOTE — PROGRESS NOTES
Patient not reached. Preop instructions left on voice mail. Aykrvn___[phone]____________    -Date__11/2/2020_____time__0945_____arrival___0815   [facility]_________  -Nothing to eat or drink after midnight  -Responsible adult 25 or older to stay on site while you are here and drive you home and stay with you after  -Follow any instructions your doctors office has given you  -Bring a complete list of all your medications and supplements  -If you normally take the following medications in the morning please do so with a small    sip of water-heart,blood pressure,seizure,breathing or thyroid-avoid water pilll Do not take blood pressure medications ending in \"maria e\" or \"pril\" the AM of surgery or the jeniffer prior  -You may use your inhalers  -Take half of your normal dose of any long acting insulins the night before-do not take    any diabetic medications in the morning  -Follow your doctors instructions regarding blood thinners  -Any questions call your surgeons office  Anesthesia attempts to review all Endo charts prior to surgery and will place any PAT orders,Surgery patients will have orders placed based on history in chart which may not be complete  ENDOSCOPY PATIENTS ONLY-FOLLOW YOUR DOCTORS BOWEL PREP INSTRUCTIONS,THIS MAY [address] MIDNIGHT  There is a one visitor policy at [facility] for all surgeries and endoscopies. Whether the visitor can stay or will be asked to wait in the car will depend on the current policy and if social distancing can be maintained. The policy is subject to change at any time. Please make sure the visitor has a cell phone that is on,charged and able to accept calls, as this may be the way that the staff communicates with them. Pain management is NO VISITOR policyThe patients ride is expected to remain in the car with a cell phone for communication. If the ride is leaving the hospital grounds please make sure they are back in time for pickup.  Have the patient inform the staff on arrival what their rides plans are while the patient is in the facility. At the MAIN there is one visitor allowed. Please note that the visitor policy is subject to change.

## 2020-10-30 NOTE — RESULT ENCOUNTER NOTE

## 2020-10-30 NOTE — PROGRESS NOTES
Jeff Wilson received a viral test for COVID-19. They were educated on isolation and quarantine as appropriate. For any symptoms, they were directed to seek care from their PCP, given contact information to establish with a doctor, directed to an urgent care or the emergency room.
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
## 2020-11-02 NOTE — ANESTHESIA POSTPROCEDURE EVALUATION
Department of Anesthesiology  Postprocedure Note    Patient: Cristina Noland  MRN: [id number]  YOB: 1960  Date of evaluation: 11/2/2020  Time:  11:42 AM     Procedure Summary     Date:  11/02/20 Room / Location:  [address]    Anesthesia Start:  9635 Anesthesia Stop:  1100    Procedures:       REMOVAL OF PORT- A -CATHETER (Right Chest)      POWER PORT-A-CATHETER PLACEMENT    ([id number],60193) (Left Chest) Diagnosis:       Infection due to Port-A-Cath, initial encounter      (O45.850T INFECTED PORT)    Surgeon:  Wanda Tilley MD Responsible Provider:  Lois Grande MD    Anesthesia Type:  general, MAC ASA Status:  2          Anesthesia Type: general, MAC    Carlton Phase I: Carlton Score: 10    Carlton Phase II:      Last vitals: Reviewed and per EMR flowsheets.        Anesthesia Post Evaluation    Level of consciousness: awake  Complications: no

## 2020-11-02 NOTE — BRIEF OP NOTE
Brief Postoperative Note      Patient: Shanti Mccoy  YOB: 1960  MRN: [id number]    Date of Procedure: 11/2/2020    Pre-Op Diagnosis: T80.219A INFECTED PORT    Post-Op Diagnosis: Same       Procedure(s):  REMOVAL OF PORT- A -CATHETER  POWER PORT-A-CATHETER PLACEMENT    ([id number],[id number])    Surgeon(s):  Randy Escalera MD    Assistant:  Surgical Assistant: Wilfredo Weaver    Anesthesia: General    Estimated Blood Loss (mL): Minimal    Complications: None    Specimens:   * No specimens in log *    Implants:  Implant Name Type Inv. Item Serial No.  Lot No. LRB No. Used Action   PORT INFUS OD2.7MM ID1. 5MM INTRO 8FR TI POLYUR CATH DETACH [[id number]] [[employer]]  PORT INFUS OD2.7MM ID1. 5MM INTRO 8FR TI POLYUR CATH DETACH [[id number]] [[employer]]  [employer] [id number] Left 1 Implanted         Drains:   [REMOVED] Closed/Suction Drain Right Abdomen Bulb 8 [place] (Removed)       [REMOVED] Biliary Tube Other (Comment) 8.5 fr RUQ (Removed)       [REMOVED] Gastrostomy/Enterostomy/Jejunostomy Gastrostomy 18 fr (Removed)       [REMOVED] Urethral Catheter Straight-tip 20 fr (Removed)       Findings:  Old right port removed, new left port placed    Electronically signed by Randy Escalera MD on 11/2/2020 at 11:00 AM

## 2020-11-02 NOTE — H&P
[facility] GENERAL AND LAPAROSCOPIC SURGERY                       PATIENT NAME: Carlos Alford     ADMISSION DATE: 11/2/2020  8:09 AM      TODAY'S DATE: 11/2/2020      HISTORY OF PRESENT ILLNESS:              The patient is a 63 y.o. male who presents with a port in place. Site has eroded and tubing exposed at skin level. Has pancreas cancer, needs IV access for chemotherapy.     Past Medical History:        Diagnosis Date    Allergic rhinitis     Cancer ([address]     pancreatic    Gout     Hypertension     resolved, no medications    Prolonged emergence from general anesthesia     slow to wake, w port insertion woke up during    Type II or unspecified type diabetes mellitus without mention of complication, not stated as uncontrolled        Past Surgical History:        Procedure Laterality Date    CYSTOSCOPY  06/19/2018    cysto, green light laser of prostate    ENDOSCOPY, COLON, DIAGNOSTIC      KNEE ARTHROSCOPY Left     OTHER SURGICAL HISTORY  02/26/2018    WHIPPLE and CHOLECYSECTOMY - Dr. Elli Hickey Right 2012    rotator cuff repair    UPPER GASTROINTESTINAL ENDOSCOPY  02/21/2018       Current Medications:   Current Facility-Administered Medications: 0.9 % sodium chloride infusion, , Intravenous, Continuous  HYDROmorphone (DILAUDID) injection 0.25 mg, 0.25 mg, Intravenous, Q5 Min PRN  fentaNYL (SUBLIMAZE) injection 50 mcg, 50 mcg, Intravenous, Q5 Min PRN  HYDROmorphone (DILAUDID) injection 0.25 mg, 0.25 mg, Intravenous, Q5 Min PRN  HYDROmorphone (DILAUDID) injection 0.5 mg, 0.5 mg, Intravenous, Q5 Min PRN  oxyCODONE (ROXICODONE) immediate release tablet 5 mg, 5 mg, Oral, PRN **OR** oxyCODONE (ROXICODONE) immediate release tablet 10 mg, 10 mg, Oral, PRN  diphenhydrAMINE (BENADRYL) injection 12.5 mg, 12.5 mg, Intravenous, Once PRN  promethazine (PHENERGAN) injection 6.25 mg, 6.25 mg, Intravenous, PRN  labetalol (NORMODYNE;TRANDATE) injection 5 mg, 5 mg, Intravenous, Q10 Min PRN  meperidine (DEMEROL) injection 12.5 mg, 12.5 mg, Intravenous, Q5 Min PRN  Prior to Admission medications    Medication Sig Start Date End Date Taking? Authorizing Provider   tamsulosin (FLOMAX) 0.4 MG capsule Take 1 capsule by mouth 2 times daily 9/2/20  Yes Capri Oh MD   allopurinol (ZYLOPRIM) 300 MG tablet TAKE 1 TABLET BY MOUTH EVERY DAY 9/2/20  Yes Capri Oh MD   potassium chloride (KLOR-CON M) 20 MEQ extended release tablet Take 1 tablet by mouth 2 times daily 9/2/20  Yes Capri Oh MD   furosemide (LASIX) 40 MG tablet TAKE 1 TABLET BY MOUTH EVERY DAY 9/2/20  Yes Capri Oh MD   cyclobenzaprine (FLEXERIL) 5 MG tablet TAKE 1 TABLET BY MOUTH 3 TIMES A DAY AS NEEDED FOR MUSCLE SPASMS 8/27/20  Yes SAUNDRA See NP   insulin lispro, 1 Unit Dial, (HUMALOG KWIKPEN) 100 UNIT/ML SOPN Inject 6 Units into the skin 3 times daily (before meals) 8/21/20  Yes Capri Oh MD   insulin glargine (LANTUS;BASAGLAR) 100 UNIT/ML injection pen Inject 28 Units into the skin every morning  Patient taking differently: Inject 33 Units into the skin every morning  8/21/20  Yes Capri Oh MD   aspirin EC 81 MG EC tablet Take 1 tablet by mouth daily 11/11/16  Yes Capri hO MD   ibuprofen (ADVIL;MOTRIN) 800 MG tablet TAKE 1 TABLET BY MOUTH 3 TIMES DAILY WITH MEALS 8/21/20   Capri Oh MD   Blood Glucose Monitoring Suppl (FREESTYLE LITE) KENTRELL 1 Device by Does not apply route 3 times daily 4/29/20   Capri Oh MD   blood glucose test strips (FREESTYLE LITE) strip 1 each by In Vitro route 3 times daily 4/29/20   Capri Oh MD   FreeStyle Lancets MISC 1 each by Does not apply route daily 4/29/20   Capri Oh MD   Insulin Pen Needle 32G X 4 MM MISC 1 each by Does not apply route 4 times daily 10/15/19   Capri Oh MD        Allergies:  Patient has no known allergies.     Social History:    reports that he has never smoked. He has never used smokeless tobacco. He reports that he does not drink alcohol or use drugs. Family History:        Problem Relation Age of Onset    High Blood Pressure Father     Heart Disease Father         atrial fibrillation    Cancer Father         colon    Diabetes Maternal Aunt     Diabetes Paternal Grandmother     Diabetes Paternal Grandfather        REVIEW OF SYSTEMS:  CONSTITUTIONAL:  positive for  fatigue and weight loss  HEENT:  negative  RESPIRATORY:  negative  CARDIOVASCULAR:  negative  GASTROINTESTINAL:  negative   GENITOURINARY:  negative  HEMATOLOGIC/LYMPHATIC:  negative  NEUROLOGICAL:  negative  SKIN: negative    PHYSICAL EXAM:  VITALS:  /76   Pulse 91   Temp 97.8 °F (36.6 °C) (Temporal)   Resp 14   Ht 5' 9\" (1.753 m)   Wt 205 lb (93 kg)   SpO2 99%   BMI 30.27 kg/m²   24HR INTAKE/OUTPUT:  No intake or output data in the 24 hours ending 11/02/20 0926  DRAIN/TUBE OUTPUT:     CONSTITUTIONAL:  alert, no apparent distress and mildly overweight  EYES:  sclera clear  ENT:  normocepalic, without obvious abnormality  NECK:  supple, symmetrical, trachea midline and no carotid bruits  LUNGS:  clear to auscultation  CARDIOVASCULAR:  regular rate and rhythm and no murmur noted  ABDOMEN:  scars noted are healed, normal bowel sounds, soft, non-distended, non-tender, voluntary guarding absent, no masses palpated, no hepatosplenomegally and hernia absent  MUSCULOSKELETAL:  0+ pitting edema lower extremities  NEUROLOGIC:  Mental Status Exam:  Level of Alertness:   awake  Orientation:   person, place, time  SKIN:  Right clavicle erosion of skin from port tubing. Port in place right chest with chronic skin inflammatory change    DATA:    Radiology Review:      No results found.     IMPRESSION/RECOMMENDATIONS:    Eroded port a cath  Poor venous access  Pancreatic cancer with need for IV access for chemotherapy    Will remove and replace port today  DW pt, all Q answered  R/B/A explained, will proceed    Shantell Newark

## 2020-11-03 NOTE — OP NOTE
[facility]                     [address] [facility], [address] [facility]                                OPERATIVE REPORT    PATIENT NAME: Ana Earl               :        1960  MED REC NO:   [id number]                          ROOM:  ACCOUNT NO:   [de-identified]                           ADMIT DATE: 2020  PROVIDER:     Jarret Vaca MD    DATE OF PROCEDURE:  2020    PREOPERATIVE DIAGNOSES:  1. Pancreas cancer. 2.  Poor venous access and need for IV access for chemotherapy. 3.  Dysfunctional port in place with eroded skin and infected  Port-A-Cath. POSTOPERATIVE DIAGNOSES:  1. Pancreas cancer. 2.  Poor venous access and need for IV access for chemotherapy. 3.  Dysfunctional port in place with eroded skin and infected  Port-A-Cath. PROCEDURE:  1.  Placement of left-sided new PowerPort, single lumen. 2.  Fluoro use for port placement. 3.  Removal of previously placed dysfunctional and eroded right-sided  Port-A-Cath. SURGEON:  Jarret Vaca MD    ANESTHESIA:  General plus local.    ESTIMATED BLOOD LOSS:  Minimal.    COMPLICATIONS:  None. SPECIMENS:  None. Old port removed and discarded. INDICATIONS FOR SURGERY:  The patient is a 50-year-old gentleman with  pancreas cancer. He is about 2 years out from resection and is on  chemotherapy. The patient needs Port-A-Cath placement for venous access  for chemotherapy, labs and imaging. He has a right-sided port placed  previously, which has eroded through the skin superficially in the  region of the clavicle, coming over this area to the right IJ vein. Attempts for local repair of this were unsuccessful and he presents for  definitive removal today. This site was confirmed with him. All  questions were answered regarding removal of this preoperatively, and  placement of a new device is planned on the left side at a separate  venous access entry site.     ADDITIONAL DETAILS OF SURGERY:  Antibiotics were administered. The  patient was taken to the operating room, placed on the operative table  in supine position. Compression boots were activated and the chest and  neck area was all prepped and draped sterilely. The area of the old  port was isolated, Betadine covered gauze was placed in this region and  this was draped away from the surgical field. Time-out was done. Local  anesthetic was administered down in the clavipectoral groove on the left  side. Skin incision was made in this area. Dissection was carried down  to the level of cephalic vein. Cephalic vein was isolated, controlled  proximally and distally with 3-0 silk sutures. A transverse tenotomy  was then created and under fluoroscopic guidance, the Port-A-Cath tubing  was passed directly into the vein through the central venous circulation  and placed at the superior vena cava right atrial junction region. Excellent aspiration and flow were established. The ties were then  secured at the vein site. The catheter was cut to correct length and  placed on the port hub. The overlying blue sleeve assembly was affixed  in position and the catheter was again aspirated and flushed and  functioned nicely. The port was then checked again with fluoro and the  positioning of the port and the tubing appeared fine. The port was then  flushed clear a final time and the wound was closed with interrupted 3-0  Vicryl in subdermal plane, 4-0 Monocryl for the skin and Dermabond glue. Once this was all sealed up, the attention was then turned to the right  side. Local anesthetic was placed in the area of the patient's prior  port. Skin incision was made transversely over the port hub region and  dissection was carried down to the catheter and hub. This was then  extracted by gently removing it from the subcutaneous tissue. It came  out smoothly without hesitation.   The Port-A-Cath tubing measurements 45  cm at the hub and 30 cm at the distal aspect. Probably the catheter was  cut and the outer half was used as opposed to the inner half with the  numbers going down to zero, when it was initially placed in  Radiology;  however, we will check a chest x-ray to make sure that there is no  additional catheter left in the chest.  It did flight out easily without  hesitation. The pseudocapsule around the port was removed. The venous access site  going and tunneling subcutaneously was suture ligated with 3-0 Vicryl  suture. The pressure was held over the IJ site and this appeared  hemostatic. The patient's wound was closed with interrupted 3-0 Vicryl  sutures and sterile dressings were placed. The patient was taken to  recovery room in stable condition postop.         Jorden Gonzalez MD    D: 11/02/2020 11:24:10       T: 11/02/2020 11:28:59     [id number]  Job#: [id number]     Doc#: [id number]    CC:  MD Alton Stewart MD

## 2020-11-04 NOTE — TELEPHONE ENCOUNTER
Danielle Casarez MD - would patient benefit from statin therapy? Patient has a history of T2DM and last LDL was 113 (5/2017). Per ADA and ACC/AHA guidelines, a moderate intensity statin is recommended; consider rosuvastatin 10 mg daily. LOV: 9/2/2020; Shauna Petros scheduled: 1/6/2021    If you'd like, I can contact patient/family to discuss any changes in therapy. Thank you,     Ary Flores, PharmD  PGY-2 Ambulatory Care Pharmacy Resident    O: [phone] opt 7    ==============================================================  CLINICAL PHARMACY: STATIN REVIEW    SUBJECTIVE:   Identified as DM care gap for [employer]: statin therapy. OBJECTIVE:  No Known Allergies    Medications per current medication list:  Current Outpatient Medications   Medication Sig Dispense Refill    HYDROcodone-acetaminophen (NORCO) 5-325 MG per tablet Take 1 tablet by mouth every 4 hours as needed for Pain for up to 7 days.  15 tablet 0    clindamycin (CLEOCIN) 300 MG capsule Take 2 capsules by mouth 3 times daily for 3 days 18 capsule 0    tamsulosin (FLOMAX) 0.4 MG capsule Take 1 capsule by mouth 2 times daily 180 capsule 1    allopurinol (ZYLOPRIM) 300 MG tablet TAKE 1 TABLET BY MOUTH EVERY DAY 90 tablet 1    potassium chloride (KLOR-CON M) 20 MEQ extended release tablet Take 1 tablet by mouth 2 times daily 180 tablet 0    furosemide (LASIX) 40 MG tablet TAKE 1 TABLET BY MOUTH EVERY DAY 90 tablet 0    cyclobenzaprine (FLEXERIL) 5 MG tablet TAKE 1 TABLET BY MOUTH 3 TIMES A DAY AS NEEDED FOR MUSCLE SPASMS 270 tablet 0    insulin lispro, 1 Unit Dial, (HUMALOG KWIKPEN) 100 UNIT/ML SOPN Inject 6 Units into the skin 3 times daily (before meals) 5 pen 0    insulin glargine (LANTUS;BASAGLAR) 100 UNIT/ML injection pen Inject 28 Units into the skin every morning (Patient taking differently: Inject 33 Units into the skin every morning ) 8 pen 3    ibuprofen (ADVIL;MOTRIN) 800 MG tablet TAKE 1 TABLET BY MOUTH 3 TIMES DAILY WITH MEALS 270 tablet 0    Blood Glucose Monitoring Suppl (FREESTYLE LITE) KENTRELL 1 Device by Does not apply route 3 times daily 1 Device 0    blood glucose test strips (FREESTYLE LITE) strip 1 each by In Vitro route 3 times daily 300 each 3    FreeStyle Lancets MISC 1 each by Does not apply route daily 100 each 3    Insulin Pen Needle 32G X 4 MM MISC 1 each by Does not apply route 4 times daily 200 each 3    aspirin EC 81 MG EC tablet Take 1 tablet by mouth daily 30 tablet 11     No current facility-administered medications for this visit. Labs:  Lab Results   Component Value Date    CHOL 186 05/20/2017    CHOL 170 05/14/2016    CHOL 178 09/01/2014     Lab Results   Component Value Date    TRIG 187 (H) 02/22/2018    TRIG 162 (H) 05/20/2017    TRIG 190 (H) 05/14/2016     Lab Results   Component Value Date    HDL 41 05/20/2017    HDL 44 05/14/2016    HDL 43 09/01/2014     Lab Results   Component Value Date    LDLCALC 113 (H) 05/20/2017    LDLCALC 88 05/14/2016    LDLCALC 111 (H) 09/01/2014     Lab Results   Component Value Date    LABVLDL 32 05/20/2017    LABVLDL 38 05/14/2016    LABVLDL 24 09/01/2014     No results found for: [facility]  Lab Results   Component Value Date    ALT 26 09/10/2020        ASCVD 10 year risk: 16.3% (based off lipid panel from 2017)    ASSESSMENT:  2019 ADA Guidelines Age:     >/= 36years old:   o No history of ASCVD or 10-year ASCVD risk < 20% - moderate-intensity statin is recommended. · Per chart review, no history of statin use     PLAN:  Sent statin recommendation message to primary care provider.     Thank you,    Ibis Lema, PharmD  PGY-2 Ambulatory Care Pharmacy Resident    O: [phone] opt 7

## 2020-11-04 NOTE — TELEPHONE ENCOUNTER
I apologize, I overlooked this. Statin therapy is not appropriate at this time.     Thank you,     Fabien Mack, RachidD  PGY-2 Ambulatory Care Pharmacy Resident    O: [phone] opt 7      CLINICAL PHARMACY CONSULT: MED RECONCILIATION/REVIEW ADDENDUM    For Pharmacy Admin Tracking Only    PHSO: Yes  Total # of Interventions Recommended: 0  Recommended intervention potential cost savings: 0  Total Interventions Accepted: 0  Time Spent (min): [address], PharmD  [address]

## 2020-11-04 NOTE — TELEPHONE ENCOUNTER
You certainly can discuss this with the patient and keep in mind that he is on chemotherapy for pancreatic cancer

## 2020-12-21 PROBLEM — K92.2 ACUTE LOWER GI BLEEDING: Status: ACTIVE | Noted: 2020-01-01

## 2020-12-21 PROBLEM — K64.9 BLEEDING HEMORRHOID: Status: ACTIVE | Noted: 2020-01-01

## 2020-12-21 NOTE — ED PROVIDER NOTES
[facility]        Pt Name: Tyrone Hinkle  MRN: [id number]  [place] 1960  Date of evaluation: 12/21/2020  Provider: Maximino Felix PA-C  PCP: Capri Oh MD     I have seen and evaluated this patient with my supervising physician Dustin Cruz MD.    [address] [place] [address]       Chief Complaint   Patient presents with    Rectal Bleeding     patient c/o rectal bleeding for 5 days. Currently taking chemo for Pancratic cancer. Family hx of colon ca       HISTORY OF PRESENT ILLNESS   (Location, Timing/Onset, Context/Setting, Quality, Duration, Modifying Factors, Severity, Associated Signs and Symptoms)  Note limiting factors. Tyrone Hinkle is a 63 y.o. male presents to the emergency department today for evaluation for rectal bleeding. The patient states that he has a history of pancreatic cancer, and he states that he is currently on chemotherapy infusions. He states that his last infusion was on Thursday. The patient states that over the past 6 days he has noticed bright red blood from his rectum. He states that he will notice it with a bowel movement, and he states that it is now just leaking on the pad that he is wearing in his underwear. Patient states that he is feeling fatigued but he states that he does not have any dizziness or lightheadedness. Patient does not have any abdominal pain. He does not have any nausea, vomiting or diarrhea. No chest pain or shortness of breath. No fever chills per no cough or congestion. He has never required a blood transfusion, but has been told that he will need to be transfused if it is under 8. He denies any history of a GI bleed. He is on a baby aspirin. Nursing Notes were all reviewed and agreed with or any disagreements were addressed in the HPI.     REVIEW OF SYSTEMS    (2-9 systems for level 4, 10 or more for level 5)     Review of Systems   Constitutional: Positive for fatigue. Negative for activity change, appetite change, chills and fever. HENT: Negative for congestion and rhinorrhea. Respiratory: Negative for cough and shortness of breath. Cardiovascular: Negative for chest pain. Gastrointestinal: Positive for blood in stool. Negative for abdominal pain, diarrhea, nausea and vomiting. Genitourinary: Negative for difficulty urinating, dysuria and hematuria. Positives and Pertinent negatives as per HPI. Except as noted above in the ROS, all other systems were reviewed and negative.        PAST MEDICAL HISTORY     Past Medical History:   Diagnosis Date    Allergic rhinitis     Cancer ([facility] [address]     pancreatic    Gout     Hypertension     resolved, no medications    Prolonged emergence from general anesthesia     slow to wake, w port insertion woke up during    Type II or unspecified type diabetes mellitus without mention of complication, not stated as uncontrolled          SURGICAL HISTORY     Past Surgical History:   Procedure Laterality Date    CYSTOSCOPY  06/19/2018    cysto, green light laser of prostate    ENDOSCOPY, COLON, DIAGNOSTIC      KNEE ARTHROSCOPY Left     OTHER SURGICAL HISTORY  02/26/2018    WHIPPLE and CHOLECYSECTOMY - Dr. Jonna Cramer Right 11/2/2020    REMOVAL OF PORT- A -CATHETER performed by Dona Em MD at [address] [facility] [address] Left 11/2/2020    POWER PORT-A-CATHETER [facility] [facility]    ([id number]) performed by Dona Em MD at [address] Right 2012    rotator cuff repair    UPPER GASTROINTESTINAL ENDOSCOPY  02/21/2018         CURRENTMEDICATIONS       Previous Medications    ALLOPURINOL (ZYLOPRIM) 300 MG TABLET    TAKE 1 TABLET BY MOUTH EVERY DAY    ASPIRIN EC 81 MG EC TABLET    Take 1 tablet by mouth daily    BLOOD GLUCOSE MONITORING SUPPL (FREESTYLE LITE) KENTRELL    1 Device by Does not apply route 3 times daily    BLOOD GLUCOSE evaluation if   clinically indicated. 5. Bilateral nonobstructing nephrolithiasis. No results found. PROCEDURES   Unless otherwise noted below, none     Procedures    CRITICAL CARE TIME   N/A    CONSULTS:  None      EMERGENCY DEPARTMENT COURSE and DIFFERENTIAL DIAGNOSIS/MDM:   Vitals:    Vitals:    12/21/20 1723   BP: 133/83   Pulse: 94   Resp: 15   Temp: 96.6 °F (35.9 °C)   TempSrc: Temporal   SpO2: 100%   Weight: 200 lb (90.7 kg)   Height: 5' 8.5\" (1.74 m)       Patient was given the following medications:  Medications   0.9 % sodium chloride bolus (1,000 mLs Intravenous New Bag 12/21/20 1813)   iopamidol (ISOVUE-370) 76 % injection 100 mL (100 mLs Intravenous Given 12/21/20 1918)           Briefly, this is a 69-year-old male, history of pancreatic cancer currently on chemotherapy for evaluation for bright red blood per rectal x6 days, patient is on aspirin, no other blood thinners. Patient states he started to feel fatigued    On physical exam, his abdomen is benign. He has bright red blood per rectum, with blood noted on my finger and blood noted coming from the dome itself    His CBC shows a leukopenia, lymphopenia, likely due to his chemotherapy. His anemia today is 8.6, down from 9.3. No occult blood was sent as it is grossly positive. Actiq acid is normal.  Coags are unremarkable    CMP shows a glucose of 246 however there is no anion gap and CO2 is normal.  Lactic acid is normal    CTA shows no evidence of acute GI bleed. Stable findings otherwise. The patient's hemoglobin is decreased when compared from previous, and he does have bright red blood per rectum, I feel that he will need to be admitted for GI evaluation for lower GI bleed. GI has been paged. Hospitalist has been paged. Stable for admission      FINAL IMPRESSION      1. Lower GI bleed    2.  Anemia, unspecified type          DISPOSITION/PLAN   DISPOSITION Decision To Admit 12/21/2020 07:59:01 PM      PATIENT REFERREDTO:  No follow-up provider specified.     DISCHARGE MEDICATIONS:  New Prescriptions    No medications on file       DISCONTINUED MEDICATIONS:  Discontinued Medications    No medications on file              (Please note that portions of this note were completed with a voice recognition program.  Efforts were made to edit the dictations but occasionally words are mis-transcribed.)    Berna Wallace PA-C (electronically signed)            Berna Wallace PA-C  12/21/20 2005

## 2020-12-21 NOTE — ED NOTES
Bed: 23  Expected date:   Expected time:   Means of arrival:   Comments:  Sumit Lancaster, [facility]  12/21/20 0963

## 2020-12-21 NOTE — ED PROVIDER NOTES
I independently performed a history and physical on Fide Quigley Garnett. All diagnostic, treatment, and disposition decisions were made by myself in conjunction with the advanced practice provider. I have participated in the medical decision making and directed the treatment plan and disposition of the patient. For further details of [address] [place] [address]'s emergency department encounter, please see the advanced practice provider's documentation. CHIEF COMPLAINT  Chief Complaint   Patient presents with    Rectal Bleeding     patient c/o rectal bleeding for 5 days. Currently taking chemo for Pancratic cancer. Family hx of colon ca     Briefly, Fide Driver is a 63 y.o. male  who presents to the ED complaining of bright red rectal bleeding. No abdominal pain but has rectal pain only during defectation. The BM's occur every few hours. He feels a little lightheaded sometimes but no syncope. No fevers. Not anticoagulated except aspirin. On chemo for pancreatic CA and sees Dr. Sofia Jamil at [facility]. No chest pain cough or SOB. No nausea/vomiting. No recent colonoscopy. H/o Whipple roughly 3 years ago. FOCUSED PHYSICAL EXAMINATION  /83   Pulse 94   Temp 96.6 °F (35.9 °C) (Temporal)   Resp 15   Ht 5' 8.5\" (1.74 m)   Wt 200 lb (90.7 kg)   SpO2 100%   BMI 29.97 kg/m²    Focused physical examination notable for no acute distress, well-appearing, well-nourished, normal speech and mentation without obvious facial droop, no obvious rash. No obvious cranial nerve deficits on my initial exam.  Pale, abd soft NTND, no peritonitis, RRR CTAB, rectal exam per VLAD. MDM:  Diagnostic considerations included kidney stone, pyelonephritis, UTI, appendicitis, bowel obstruction, diverticulitis, hernia, gastritis/gastroenteritis, pancreatitis, cholecystitis, hepatitis, constipation, IBS, IBD, LGIB UGIB    ED course was notable for suspect LGIB by history, no n/v or melena to suggest UGIB.   Hgb is 8.6 which is lower than his 9-10 baseline, and VS are not indicative of hemorrhagic shock but this will need to be closely monitored. No indication for transfusion currently. This will need to be trended though. The patient has no CT evidence of an active GI bleed or extravasation and stable necrotic lesion in the retroperitoneum. His hemodynamics and findings are not consistent with an aortoenteric fistula in his bowels do not appear inflamed on the imaging. During the patient's ED course, the patient was given:  Medications   0.9 % sodium chloride bolus (0 mLs Intravenous Stopped 12/21/20 2058)   iopamidol (ISOVUE-370) 76 % injection 100 mL (100 mLs Intravenous Given 12/21/20 1918)        CLINICAL IMPRESSION  1. Lower GI bleed    2. Anemia, unspecified type        DISPOSITION  Peyton Farah was admitted in fair condition. The plan is to admit to the hospital at this time under the hospitalist service. Hospitalist accepted the patient and will take over the patient's care. The total critical care time spent while evaluating and treating this patient was 34 minutes. This excludes time spent doing separately billable procedures. This includes time at the bedside, data interpretation, medication management, obtaining critical history from collateral sources if the patient is unable to provide it directly, and physician consultation. Specifics of interventions taken and potentially life-threatening diagnostic considerations are listed above in the medical decision making. This chart was created using Dragon dictation software. Efforts were made by me to ensure accuracy, however some errors may be present due to limitations of this technology.             Natalia Galicia MD  12/21/20 0647

## 2020-12-22 NOTE — PROGRESS NOTES
Admission assessment complete. See flow sheet. Meds given per MAR. Pt admitted from home after having rectal bleeding for 6 days. Admitted with an acute GI bleed. Port of left chest accessed. Q6H H/H nurse draws. 0300 Hemoglobin 8.5 next draw at 0900. Consults with general surgery and GI, will see pt in the AM. NPO after midnight. Pt is AxO x4. Take pills WWW. Urinal at bedside. SBA  X1, x1 BM loose with streaks of blood. Pt expressed no other needs at this time. Will continue to educate patient as needed. Fall precautions in place, hourly rounding, call light and belongings in reach, bed in lowest position, wheels locked in place, side rails up x 2, walkways free of clutter    The care plan and education has been reviewed and mutually agreed upon with the patient.

## 2020-12-22 NOTE — PROGRESS NOTES
Patient doing well. VSS. BS is 78. Patient denies any complaints. FLoor notified of patients return. Patient ready for discharge to floor.

## 2020-12-22 NOTE — H&P
Hospital Medicine History & Physical      PCP: Trinidad Dawson MD    Date of Admission: 12/21/2020    Date of Service: Pt seen/examined on 12/21/2020  and Admitted to Inpatient with expected LOS greater than two midnights due to medical therapy. Chief Complaint:    Chief Complaint   Patient presents with    Rectal Bleeding     patient c/o rectal bleeding for 5 days. Currently taking chemo for Pancratic cancer. Family hx of colon ca        History Of Present Illness: The patient is a 63 y.o. male with history of pancreatic cancer on chemotherapy, pancytopenia secondary to cancer, hypertension, type 2 diabetes presented with a 6-day history of reported bright red blood per rectum. He reports that he started about a week ago after being constipated and had to strain to pass a bowel motion with hard stools. At the time he felt some pain and blood coating on the stool and in the toilet bowl. It has persisted since with associated rectal/anal pain. No abdominal pains. No fevers or chills. No nausea or vomiting. He reports feeling weak and dizziness on getting up. We do not have any recent H&H in the system. Current H&H 8.6 and 27.3. CT abdomen did not reveal any acute pathology  INR is 1.17 and patient is on aspirin but no other anticoagulation.     Past Medical History:        Diagnosis Date    Allergic rhinitis     Cancer ([address]     pancreatic    Gout     Hypertension     resolved, no medications    Prolonged emergence from general anesthesia     slow to wake, w port insertion woke up during    Type II or unspecified type diabetes mellitus without mention of complication, not stated as uncontrolled        Past Surgical History:        Procedure Laterality Date    CYSTOSCOPY  06/19/2018    cysto, green light laser of prostate    ENDOSCOPY, COLON, DIAGNOSTIC      KNEE ARTHROSCOPY Left     OTHER SURGICAL HISTORY  02/26/2018    WHIPPLE and CHOLECYSECTOMY - Dr. Alexus German MISC 1 each by Does not apply route daily 4/29/20   Lauren Linda MD   Insulin Pen Needle 32G X 4 MM MISC 1 each by Does not apply route 4 times daily 10/15/19   Lauren Linda MD       Allergies:  Patient has no known allergies. Social History:  The patient currently lives with family    TOBACCO:   reports that he has never smoked. He has never used smokeless tobacco.  ETOH:   reports no history of alcohol use. Family History:  Reviewed in detail and negative for DM, Early CAD, Cancer, CVA. Positive as follows:        Problem Relation Age of Onset    High Blood Pressure Father     Heart Disease Father         atrial fibrillation    Cancer Father         colon    Diabetes Maternal Aunt     Diabetes Paternal Grandmother     Diabetes Paternal Grandfather        REVIEW OF SYSTEMS:   Positive for rectal bleed and as noted in the HPI. All other systems reviewed and negative. PHYSICAL EXAM:    /83   Pulse 94   Temp 96.6 °F (35.9 °C) (Temporal)   Resp 15   Ht 5' 8.5\" (1.74 m)   Wt 200 lb (90.7 kg)   SpO2 100%   BMI 29.97 kg/m²     General appearance: No apparent distress appears stated age and cooperative. HEENT Normal cephalic, atraumatic without obvious deformity. Pupils equal, round, and reactive to light. Extra ocular muscles intact. Conjunctivae/corneas clear. Neck: Supple, No jugular venous distention/bruits. Trachea midline without thyromegaly or adenopathy with full range of motion. Lungs: Clear to auscultation, bilaterally without Rales/Wheezes/Rhonchi with good respiratory effort. Heart: Regular rate and rhythm with Normal S1/S2 without murmurs, rubs or gallops, point of maximum impulse non-displaced  Abdomen: Soft, non-tender or non-distended without rigidity or guarding and positive bowel sounds all four quadrants. Extremities: No clubbing, cyanosis, or edema bilaterally. Full range of motion without deformity and normal gait intact.   Skin: Skin color, texture, turgor normal.  No rashes or lesions. Neurologic: Alert and oriented X 3, neurovascularly intact with sensory/motor intact upper extremities/lower extremities, bilaterally. Cranial nerves: II-XII intact, grossly non-focal.  Mental status: Alert, oriented, thought content appropriate. CBC   Recent Labs     12/21/20 1810   WBC 3.0*   HGB 8.6*   HCT 27.3*   PLT 68*      RENAL  Recent Labs     12/21/20 1810   *   K 4.2      CO2 27   BUN 13   CREATININE 0.7*     LFT'S  Recent Labs     12/21/20 1810   AST 18   ALT 14   BILITOT 0.4   ALKPHOS 141*     COAG  Recent Labs     12/21/20 1810   INR 1.17*     CARDIAC ENZYMES  No results for input(s): CKTOTAL, CKMB, CKMBINDEX, TROPONINI in the last 72 hours.     U/A:    Lab Results   Component Value Date    COLORU YELLOW 10/18/2018    WBCUA 55 10/18/2018    RBCUA 10 10/18/2018    BACTERIA 4+ 05/20/2018    CLARITYU Clear 10/18/2018    SPECGRAV 1.016 10/18/2018    LEUKOCYTESUR MODERATE 10/18/2018    BLOODU MODERATE 10/18/2018    GLUCOSEU 500 10/18/2018       ABG    Lab Results   Component Value Date    AUI4NOS 23.7 02/26/2018    BEART -3 02/26/2018    U7BNWCDT 90 02/26/2018    PHART 7.279 02/26/2018    MDL0ARX 50.5 02/26/2018    PO2ART 65.4 02/26/2018    XRR6JZB 25 02/26/2018           Active Hospital Problems    Diagnosis Date Noted    Acute lower GI bleeding [K92.2] 12/21/2020    Bleeding hemorrhoid [K64.9] 12/21/2020    Type 2 diabetes mellitus with hyperglycemia, with long-term current use of insulin (HCC) [E11.65, Z79.4] 01/09/2019    Pancytopenia due to chemotherapy [facility]) [D61.810] 08/29/2018    Malignant neoplasm of head of pancreas ([facility] [address] [C25.0] 03/27/2018    Essential hypertension [I10] 10/24/2012         ASSESSMENT/PLAN:  63 y.o. male with history of pancreatic cancer on chemotherapy, pancytopenia secondary to cancer, hypertension, type 2 diabetes presented with a 6-day history of reported bright red blood per rectum concerning for acute GI bleed due to bleeding hemorrhoids with posthemorrhagic anemia    Plan:  -Monitor H&H every 6  Hours  -If hemoglobin drops below 7.0, to transfuse  -Monitor hemodynamics  -GI and general surgery consult  -Hold aspirin and avoid anticoagulant  -Continue other chronic home medication  -Glycemic control with insulin      DVT Prophylaxis: SCD  Diet: Diet NPO, After Midnight Exceptions are: Ice Chips  DIET CARB CONTROL;  Code Status: Prior         Jake Ventura MD    Thank you Garima Hernandez MD for the opportunity to be involved in this patient's care. If you have any questions or concerns please feel free to contact me at [phone].

## 2020-12-22 NOTE — PROGRESS NOTES
Patient arrived from ENDO to PACU 6. Attached to monitoring system. Report received per ENDO nurse and CRNA. No problems reported during procedure. VSS. Patient drowsy at this time.

## 2020-12-22 NOTE — CONSULTS
Gastroenterology Consult Note      Patient: Rebecca Patiño  : 1960  Acct#:      Date:  2020    Subjective:       History of Present Illness  Patient is a 63 y.o.  male admitted with Acute lower GI bleeding [K92.2] who is seen in consult for BRBPR. He has h/o pancreatic ca s/p whipple. He is on chemo. Has pancytopenia due to chemo. He was passing hard BMs about a week ago and then began having bright red blood dripping and soiling his underwear daily since. Does report some perianal pain. No abdominal pain, N/V. No fevers, chills. He had a colonoscopy in 2018 with polyps and hemorrhoids and is due for repeat screening colonoscopy. Past Medical History:   Diagnosis Date    Allergic rhinitis     Cancer ([facility] [address]     pancreatic    Gout     Hypertension     resolved, no medications    Prolonged emergence from general anesthesia     slow to wake, w port insertion woke up during    Type II or unspecified type diabetes mellitus without mention of complication, not stated as uncontrolled       Past Surgical History:   Procedure Laterality Date    CYSTOSCOPY  2018    cysto, green light laser of prostate    ENDOSCOPY, COLON, DIAGNOSTIC      KNEE ARTHROSCOPY Left     OTHER SURGICAL HISTORY  2018    WHIPPLE and CHOLECYSECTOMY - Dr. Patel  Right 2020    REMOVAL OF PORT- A -CATHETER performed by Bianca Mixon MD at [place] [address] Left 2020    POWER PORT-A-CATHETER [facility] [facility]    ([id number]) performed by Bianca Mixon MD at [address] [place] [address] Right     rotator cuff repair    UPPER GASTROINTESTINAL ENDOSCOPY  2018      Past Endoscopic History: see hpi    Admission Meds  No current facility-administered medications on file prior to encounter.       Current Outpatient Medications on File Prior to Encounter   Medication Sig Dispense Refill    cyclobenzaprine (FLEXERIL) 5 MG tablet TAKE 1 TABLET BY MOUTH 3  TIMES DAILY AS NEEDED FOR  MUSCLE SPASMS 270 tablet 0    furosemide (LASIX) 40 MG tablet TAKE 1 TABLET BY MOUTH  DAILY 90 tablet 3    HUMALOG KWIKPEN 100 UNIT/ML SOPN INJECT 6 UNITS  SUBCUTANEOUSLY 3 TIMES  DAILY (BEFORE MEALS) 15 mL 3    tamsulosin (FLOMAX) 0.4 MG capsule Take 1 capsule by mouth 2 times daily 180 capsule 1    allopurinol (ZYLOPRIM) 300 MG tablet TAKE 1 TABLET BY MOUTH EVERY DAY 90 tablet 1    insulin glargine (LANTUS;BASAGLAR) 100 UNIT/ML injection pen Inject 28 Units into the skin every morning (Patient taking differently: Inject 33 Units into the skin every morning ) 8 pen 3    ibuprofen (ADVIL;MOTRIN) 800 MG tablet TAKE 1 TABLET BY MOUTH 3 TIMES DAILY WITH MEALS 270 tablet 0    blood glucose test strips (FREESTYLE LITE) strip 1 each by In Vitro route 3 times daily 300 each 3    aspirin EC 81 MG EC tablet Take 1 tablet by mouth daily 30 tablet 11    potassium chloride (KLOR-CON M) 20 MEQ extended release tablet TAKE 1 TABLET BY MOUTH  TWICE DAILY 180 tablet 3    Blood Glucose Monitoring Suppl (FREESTYLE LITE) KENTRELL 1 Device by Does not apply route 3 times daily 1 Device 0    FreeStyle Lancets MISC 1 each by Does not apply route daily 100 each 3    Insulin Pen Needle 32G X 4 MM MISC 1 each by Does not apply route 4 times daily 200 each 3            Allergies  No Known Allergies   Social   Social History     Tobacco Use    Smoking status: Never Smoker    Smokeless tobacco: Never Used   Substance Use Topics    Alcohol use: No        Family History   Problem Relation Age of Onset    High Blood Pressure Father     Heart Disease Father         atrial fibrillation    Cancer Father         colon    Diabetes Maternal Aunt     Diabetes Paternal Grandmother     Diabetes Paternal Grandfather         Review of Systems  Constitutional: negative for fevers, chills, sweats    Ears, nose, mouth, throat, and face: negative for nasal congestion and sore throat   Respiratory: negative for cough and shortness of breath   Cardiovascular: negative for chest pain and dyspnea   Gastrointestinal: see hpi   Genitourinary:negative for dysuria and frequency   Integument/breast: negative for pruritus and rash   Hematologic/lymphatic: negative for bleeding and easy bruising   Musculoskeletal:negative for arthralgias and myalgias   Neurological: negative for dizziness and weakness         Physical Exam  Blood pressure 127/78, pulse 98, temperature 98.3 °F (36.8 °C), temperature source Oral, resp. rate 16, height 5' 8.5\" (1.74 m), weight 200 lb (90.7 kg), SpO2 98 %. General appearance: alert, pale, cooperative, no distress, appears stated age  Eyes: Anicteric  Head: Normocephalic, without obvious abnormality  Lungs: clear to auscultation bilaterally, Normal Effort  Heart: regular rate and rhythm, normal S1 and S2, no murmurs or rubs  Abdomen: soft, non-tender. Bowel sounds normal. No masses,  no organomegaly. Extremities: atraumatic, no cyanosis or edema  Skin: warm and dry, no jaundice  Neuro: Grossly intact, A&OX3  Musculoskeletal: 5/5  strength BUE      Data Review:    Recent Labs     12/21/20 1810 12/22/20  0321   WBC 3.0* 3.4*   HGB 8.6* 8.5*   HCT 27.3* 26.9*   MCV 81.7 80.1   PLT 68* 72*     Recent Labs     12/21/20 1810 12/22/20  0321   * 134*   K 4.2 4.0    103   CO2 27 26   BUN 13 11   CREATININE 0.7* 0.7*     Recent Labs     12/21/20 1810   AST 18   ALT 14   BILITOT 0.4   ALKPHOS 141*     No results for input(s): LIPASE, AMYLASE in the last 72 hours. Recent Labs     12/21/20 1810   PROTIME 13.6*   INR 1.17*     No results for input(s): PTT in the last 72 hours. No results for input(s): OCCULTBLD in the last 72 hours. Imaging Studies:                 CTA-scan of abdomen and pelvis 12/21/20  Impression   1. No evidence of acute GI bleed.    2. Prior history of Whipple procedure with no abnormalities of the GI tract

## 2020-12-22 NOTE — PROGRESS NOTES
Shift assessment completed. VSS. Denies having any pain at this time. The care plan and education have been reviewed and mutually agreed upon with the patient. Call light in reach. Will continue to monitor. 1337 Patient had one episode of emesis at this time after eating lunch. Prn medication given, see MAR. Provider notified and can continue with discharge as planned.
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
## 2020-12-22 NOTE — BRIEF OP NOTE
Flex sig:  Normal colored stool in the rectum and sigmoid. Unprepped. Active bleeding was noted from the anal canal.  It appeared that there were small anal fissures and small hemorrhoids. Rec:   Fiber supplement             Can apply topical hydrocortisone x 1 wk. [id number] [address]y for [employer]. Ebl[de-identified]  < 5ml.        Nilda Silverio MD  [address]

## 2020-12-22 NOTE — ANESTHESIA POSTPROCEDURE EVALUATION
Department of Anesthesiology  Postprocedure Note    Patient: Mohini Mosquera  MRN: [id number]  YOB: 1960  Date of evaluation: 12/22/2020  Time:  10:40 AM     Procedure Summary     Date: 12/22/20 Room / Location: [address] [place] [address]    Anesthesia Start: 7631 Anesthesia Stop: 8311    Procedure: SIGMOIDOSCOPY DIAGNOSTIC FLEXIBLE (N/A ) Diagnosis: (Rectal bleeding)    Surgeons: Damion Gudino MD Responsible Provider: Gaetano Yeh MD    Anesthesia Type: MAC ASA Status: 3          Anesthesia Type: MAC    Carlton Phase I: Carlton Score: 10    Carlton Phase II:      Last vitals: Reviewed and per EMR flowsheets.        Anesthesia Post Evaluation    Patient location during evaluation: PACU  Patient participation: complete - patient participated  Level of consciousness: awake and alert  Pain score: 2  Airway patency: patent  Nausea & Vomiting: no vomiting  Complications: no  Cardiovascular status: blood pressure returned to baseline  Respiratory status: acceptable  Hydration status: euvolemic

## 2020-12-22 NOTE — ED NOTES
Report called to AdanT, RN verbalized understanding and denied any need for further information, patient to be transported to unit at this time      Josselin Bobby RN  12/22/20 9168

## 2020-12-22 NOTE — PROGRESS NOTES
Patient returned to room. Bedside report given to Marco A Jackson RN on 4t. All questions and concerns addressed. VSS.
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
## 2020-12-28 NOTE — DISCHARGE SUMMARY
[facility] DISCHARGE SUMMARY    Patient Demographics    Patient. F[facility]  Date of Birth. 1960  MRN. [id number]     Primary care provider. Kevan Boyd MD  (Tel: [phone])    Admit date: 12/21/2020    Discharge date (blank if same as Note Date): 12/22/2020  Note Date: 12/28/2020     Reason for Hospitalization. Chief Complaint   Patient presents with    Rectal Bleeding     patient c/o rectal bleeding for 5 days. Currently taking chemo for Pancratic cancer. Family hx of colon ca           [facility] Course. Lower gi bleed  - underwent colonscopy  - Active bleeding was noted from the anal canal.  It appeared that there were small anal fissures and small hemorrhoids  - discharged stable on topical steroids and fiber supplement     Consults. IP CONSULT TO GI  IP CONSULT TO GENERAL SURGERY    Physical examination on discharge day. /83   Pulse 93   Temp 99.9 °F (37.7 °C) (Oral)   Resp 16   Ht 5' 8.5\" (1.74 m)   Wt 200 lb (90.7 kg)   SpO2 98%   BMI 29.97 kg/m²   General appearance. Alert. Looks comfortable. HEENT. Sclera clear. Moist mucus membranes. Cardiovascular. Regular rate and rhythm, normal S1, S2. No murmur. Respiratory. Not using accessory muscles. Clear to auscultation bilaterally, no wheeze. Gastrointestinal. Abdomen soft, non-tender, not distended, normal bowel sounds  Neurology. Facial symmetry. No speech deficits. Moving all extremities equally. Extremities. No edema in lower extremities. Skin. Warm, dry, normal turgor    Condition at time of discharge stable     Medication instructions provided to patient at discharge.      Medication List      CHANGE how you take these medications    insulin glargine 100 UNIT/ML injection pen  Commonly known as: LANTUS;BASAGLAR  Inject 28 Units into the skin every morning  What changed: how much to take

## 2020-12-28 NOTE — PROGRESS NOTES
Physician Progress Note      PATIENT:               Heath Aldana  CSN #:                  [id number]  :                       1960  ADMIT DATE:       2020 5:19 PM  [facility] DATE:        2020 2:55 PM  RESPONDING  PROVIDER #:        Cecilio Navas MD          QUERY TEXT:    Pt admitted with Rectal Bleeding. Pt noted to have Pancreatic Cancer s/p CHEMO   Tx. If possible, please document in progress notes and discharge summary the   etiology of Rectal Bleeding. The medical record reflects the following:  Risk Factors: 63 y.o. male with history of pancreatic cancer on chemotherapy,   pancytopenia secondary to cancer, presented with a 6-day history of reported   bright red blood per rectum. Clinical Indicators: Hgb 8.6 Hct 27.3, RBC 3.35, Plt 68, s/p flex sig active   bleeding in anal canal w small anal fissures and hemorrhoids. Per GEN/SURG   consult 20 : Bleeding likely due to Pancytopenia. Treatment: monitoring, GI and GEN Surg consults, s/p Flex Sig. Colonoscopy    Thank you. Please contact me if you have any questions @ [email or web address],  Soham Florence RN, MSN, CDS  Options provided:  -- Rectal Bleeding  due to Hemorrhoids  -- Rectal Bleeding due to Pancytopenia  -- Rectal Bleeding due to Malignant neoplasm of Head of Pancreas. -- Other - I will add my own diagnosis  -- Disagree - Not applicable / Not valid  -- Disagree - Clinically unable to determine / Unknown  -- Refer to Clinical Documentation Reviewer    PROVIDER RESPONSE TEXT:    This patient has Rectal Bleeding due to Hemorrhoids. Query created by: Vianey Verma on 2020 8:57 AM      QUERY TEXT:    Patient admitted with Rectal Bleeding, noted to have Urine culture positive   for Klebsiella aerogenes. . If possible, please document in progress notes and   discharge summary if you are evaluating and/or treating any of the following:     The medical record reflects the following: Risk Factors: 63 y.o. male with history of pancreatic cancer on chemotherapy,   pancytopenia secondary to cancer, hypertension, type 2 diabetes presented with   a 6-day history of reported bright red blood per rectum  Clinical Indicators: Urine culture 12/22/20: Klebsiella aerogenes, Urine +   wbc, leukocytes  Treatment: urine culture & sensitivity, IVFs, monitoring    Thank you. Please contact me if you have any questions @ [email or web address],  Soham Florence RN, MSN, CDS  Options provided:  -- UTI due to Klebsiella aerogenes  -- UTI due to [place] Jose aerogenes not clinically significant  -- Other - I will add my own diagnosis  -- Disagree - Not applicable / Not valid  -- Disagree - Clinically unable to determine / Unknown  -- Refer to Clinical Documentation Reviewer    PROVIDER RESPONSE TEXT:    UTI due to Klebsiella aerogenes is not clinically significant.     Query created by: Vianey Verma on 12/28/2020 9:08 AM      Electronically signed by:  Cecilio Navas MD 12/28/2020 2:00 PM negative...

## 2020-12-29 PROBLEM — S82.891A MALLEOLAR FRACTURE, RIGHT, CLOSED, INITIAL ENCOUNTER: Status: ACTIVE | Noted: 2020-01-01

## 2020-12-29 NOTE — ED PROVIDER NOTES
This patient was seen by the Mid-Level Provider. I have seen and examined the patient, agree with the workup, evaluation, management and diagnosis. Care plan has been discussed. My assessment reveals a 24-year-old male with a history of pancreatic cancer on chemotherapy who presents with some generalized weakness. This is a 24-year-old male with a history of pancreatic cancer, on chemotherapy, who presents with a low potassium, thrush and generalized weakness. Patient states has had diarrhea for the last several weeks. Patient also states he has been falling at home and has some right ankle pain. Radiology results:    XR FOOT RIGHT (MIN 3 VIEWS)   Final Result   Slight soft tissue swelling question in the forefoot. No acute bony   abnormality. Bones are osteoporotic. XR ANKLE RIGHT (MIN 3 VIEWS)   Final Result   Minimally displaced fracture of the lateral malleolus. Possible very small coexistent fracture the periarticular anterior tibia,   versus artifact from overlying shadows.          CT HEAD WO CONTRAST    (Results Pending)         LABS:    Labs Reviewed   BASIC METABOLIC PANEL - Abnormal; Notable for the following components:       Result Value    Potassium 2.2 (*)     Chloride 111 (*)     CO2 13 (*)     Glucose 266 (*)     All other components within normal limits    Narrative:     CALL  [facility] tel. [phone],  Chemistry results called to and read back by medardo price, 12/29/2020  14:57, by MARYLU  Performed at:  [facility] [address],  [facility], [address]   Phone [phone]   CBC WITH AUTO DIFFERENTIAL - Abnormal; Notable for the following components:    WBC 2.9 (*)     RBC 3.86 (*)     Hemoglobin 9.8 (*)     Hematocrit 31.0 (*)     MCH 25.5 (*)     RDW 23.8 (*)     Lymphocytes Absolute 0.1 (*)     Toxic Granulation Present (*)     Macrocytes Occasional (*)     Polychromasia Occasional (*)     Hypochromia Occasional (*) Schistocytes Occasional (*)     [place] Cells Occasional (*)     Ovalocytes 1+ (*)     Tear Drop Cells Occasional (*)     All other components within normal limits    Narrative:     Performed at:  [facility] [address], [place], [address]   Phone [phone]   PROTIME-INR - Abnormal; Notable for the following components:    Protime 16.2 (*)     INR 1.39 (*)     All other components within normal limits    Narrative:     Performed at:  [facility] [address], [place], [address] [employer]   Phone [phone]   HEPATIC FUNCTION PANEL - Abnormal; Notable for the following components:     Total Protein 5.2 (*)     Alb 2.7 (*)     Alkaline Phosphatase 148 (*)     All other components within normal limits    Narrative:     Mindy Sales  [facility] tel. [phone],  Chemistry results called to and read back by medardo price, 12/29/2020  14:57, by DECDA  Performed at:  [facility] [address], [place], [address]   Phone [phone]   LIPASE - Abnormal; Notable for the following components:    Lipase 5.0 (*)     All other components within normal limits    Narrative:     Isaacgene Mónica  [facility] tel. [phone],  Chemistry results called to and read back by medardo price, 12/29/2020  14:57, by DECDA  Performed at:  [facility] [address], [place], [address]   Phone [phone]   POCT GLUCOSE - Abnormal; Notable for the following components:    POC Glucose 245 (*)     All other components within normal limits    Narrative:     Performed at:  [facility] [address], [place], [address]   Phone [phone]   MAGNESIUM    Narrative:     Fernando Conti. [phone],  Chemistry results called to and read back by medardo price, 12/29/2020  14:57, by DECDA  Performed at:  [facility]  [address] [facility], [place], [address]   Phone [phone]   POCT GLUCOSE           EKG:    Junctional rhythm versus atrial fibrillation versus sinus arrhythmia at a rate of 98 beats a minute with no acute ST elevations or depressions or pathologic Q waves. Exam:    Ill-appearing male but no acute distress. Neurologically he was alert and oriented with no focal neurological motor or sensory deficits throughout. Medical decision makin-year-old male with a history of end-stage pancreatic cancer who presents with generalized weakness and hypokalemia. The patient was given admitted for further care, potassium replacement, IV fluid with oncology consultation. He is in stable condition. FINAL IMPRESSION:    1. Hypokalemia    2. Generalized weakness    3. Diarrhea, unspecified type    4.  Closed nondisplaced fracture of lateral malleolus of right fibula, initial encounter           Beverley Oshea MD  [phone]

## 2020-12-29 NOTE — ED NOTES
Patient assisted to bedside commode, had loose stool. No blood noted.       Luis Zarate RN  12/29/20 [id number]

## 2020-12-29 NOTE — ED PROVIDER NOTES
[facility]        Pt Name: Chasity Last  MRN: [id number]  [place] 1960  Date of evaluation: 12/29/2020  Provider: Colton Henriquez PA-C  PCP: Carmen Adorno MD     I have seen and evaluated this patient with my supervising physician Sameer Sullivan MD.    [address] [place] [address]       Chief Complaint   Patient presents with   [facility] Fatigue     Pt sent by Dr. Ej Raymond d/t potassium 2.1 and thrush. Pt has hx pancreatic cancer. Reports diarrhea times 5 weeks       HISTORY OF PRESENT ILLNESS   (Location, Timing/Onset, Context/Setting, Quality, Duration, Modifying Factors, Severity, Associated Signs and Symptoms)  Note limiting factors. Chasity Last is a 63 y.o. male who presents to the emergency department today for evaluation for general fatigue, nausea, and diarrhea. The patient has a history of pancreatic cancer, is followed by Dr. Ej Raymond, oncology. The patient states that for the past few days he has had increasing fatigue, and multiple episodes of diarrhea. There has been no blood in the stool or black tarry appearance of the stool. He was recently admitted for GI bleed, colonoscopy at that time did show anal fissures and hemorrhoids. The patient was sent home from the hospital last Tuesday. He has not really been eating or drinking much since being home. The family states that the patient had 3 falls 6 days ago, and family is concerned about the bruising to the ankle as well as to the foot. The patient did fall and hit his head 1 time next days ago, but there is no loss of consciousness. No vomiting. The patient has been acting normally since. Patient has no headaches the patient was supposed to have his therapy today, however after seeing his oncologist, having basic labs drawn, he was told to come to the emergency room because his potassium was low.   The patient is complaining of generalized abdominal pain which he is rating is a 6/10. States that this is due to his normal pain that he has. He is not had any vomiting. He has no chest pain or shortness of breath. He has no fever chills. He has no cough or congestion. He has no urinary symptoms. Nursing Notes were all reviewed and agreed with or any disagreements were addressed in the HPI. REVIEW OF SYSTEMS    (2-9 systems for level 4, 10 or more for level 5)     Review of Systems   Constitutional: Positive for fatigue. Negative for activity change, appetite change, chills and fever. HENT: Negative for congestion and rhinorrhea. Respiratory: Negative for cough and shortness of breath. Cardiovascular: Negative for chest pain. Gastrointestinal: Positive for diarrhea. Negative for abdominal pain, nausea and vomiting. Genitourinary: Negative for difficulty urinating, dysuria and hematuria. Musculoskeletal: Positive for arthralgias. Skin: Negative for wound. Neurological: Negative for weakness and numbness. Positives and Pertinent negatives as per HPI. Except as noted above in the ROS, all other systems were reviewed and negative.        PAST MEDICAL HISTORY     Past Medical History:   Diagnosis Date    Allergic rhinitis     Cancer ([facility] [address]     pancreatic    Gout     Hypertension     resolved, no medications    Prolonged emergence from general anesthesia     slow to wake, w port insertion woke up during    Type II or unspecified type diabetes mellitus without mention of complication, not stated as uncontrolled          SURGICAL HISTORY     Past Surgical History:   Procedure Laterality Date    CYSTOSCOPY  06/19/2018    cysto, green light laser of prostate    ENDOSCOPY, COLON, DIAGNOSTIC      KNEE ARTHROSCOPY Left     OTHER SURGICAL HISTORY  02/26/2018    [facility]    REMOVAL OF PORT- A -CATHETER performed by Anum Parikh MD at [id number] [place] [address] PORT SURGERY Left 11/2/2020    POWER PORT-A-CATHETER PLACEMENT    ([id number]) performed by Reese Acharya MD at [address] [place] [address] Right 2012    rotator cuff repair    SIGMOIDOSCOPY N/A 12/22/2020    SIGMOIDOSCOPY DIAGNOSTIC FLEXIBLE performed by Michelle Olivas MD at [place] [address]  02/21/2018         CURRENTMEDICATIONS       Previous Medications    ALLOPURINOL (ZYLOPRIM) 300 MG TABLET    TAKE 1 TABLET BY MOUTH EVERY DAY    ASPIRIN EC 81 MG EC TABLET    Take 1 tablet by mouth daily    BLOOD GLUCOSE MONITORING SUPPL (FREESTYLE LITE) KENTRELL    1 Device by Does not apply route 3 times daily    BLOOD GLUCOSE TEST STRIPS (FREESTYLE LITE) STRIP    1 each by In Vitro route 3 times daily    CYCLOBENZAPRINE (FLEXERIL) 5 MG TABLET    TAKE 1 TABLET BY MOUTH 3  TIMES DAILY AS NEEDED FOR  MUSCLE SPASMS    FREESTYLE LANCETS MISC    1 each by Does not apply route daily    FUROSEMIDE (LASIX) 40 MG TABLET    TAKE 1 TABLET BY MOUTH  DAILY    HUMALOG KWIKPEN 100 UNIT/ML SOPN    INJECT 6 UNITS  SUBCUTANEOUSLY 3 TIMES  DAILY (BEFORE MEALS)    IBUPROFEN (ADVIL;MOTRIN) 800 MG TABLET    TAKE 1 TABLET BY MOUTH 3 TIMES DAILY WITH MEALS    INSULIN GLARGINE (LANTUS;BASAGLAR) 100 UNIT/ML INJECTION PEN    Inject 28 Units into the skin every morning    INSULIN PEN NEEDLE 32G X 4 MM MISC    1 each by Does not apply route 4 times daily    POTASSIUM CHLORIDE (KLOR-CON M) 20 MEQ EXTENDED RELEASE TABLET    TAKE 1 TABLET BY MOUTH  TWICE DAILY    TAMSULOSIN (FLOMAX) 0.4 MG CAPSULE    Take 1 capsule by mouth 2 times daily         ALLERGIES     Patient has no known allergies.     FAMILYHISTORY       Family History   Problem Relation Age of Onset    High Blood Pressure Father     Heart Disease Father         atrial fibrillation    Cancer Father         colon    Diabetes Maternal Aunt     Diabetes Paternal Grandmother     Diabetes Paternal Grandfather           SOCIAL HISTORY Social History     Tobacco Use    Smoking status: Never Smoker    Smokeless tobacco: Never Used   Substance Use Topics    Alcohol use: No    Drug use: No       SCREENINGS             PHYSICAL EXAM    (up to 7 for level 4, 8 or more for level 5)     ED Triage Vitals [12/29/20 1333]   BP Temp Temp Source Pulse Resp SpO2 Height Weight   (!) 145/94 97.8 °F (36.6 °C) Temporal 112 18 100 % -- 184 lb (83.5 kg)       Physical Exam  Vitals signs and nursing note reviewed. Constitutional:       Appearance: He is well-developed. He is ill-appearing. He is not diaphoretic. Comments: Ill-appearing, nontoxic   HENT:      Head: Normocephalic and atraumatic. Right Ear: External ear normal.      Left Ear: External ear normal.      Nose: Nose normal.   Eyes:      General:         Right eye: No discharge. Left eye: No discharge. Neck:      Musculoskeletal: Normal range of motion and neck supple. Trachea: No tracheal deviation. Cardiovascular:      Rate and Rhythm: Normal rate and regular rhythm. Heart sounds: No murmur. Pulmonary:      Effort: Pulmonary effort is normal. No respiratory distress. Breath sounds: Normal breath sounds. No wheezing or rales. Abdominal:      General: Bowel sounds are normal. There is no distension. Palpations: Abdomen is soft. Tenderness: There is no abdominal tenderness. There is no guarding. Musculoskeletal: Normal range of motion. Comments: There is ecchymosis, tenderness and edema noted over the lateral malleolus on the right, and over the dorsum of the right foot. Dorsalis pedis and posterior tibialis pulse are 2+. Normal sensation light touch for neurovascularly intact   Skin:     General: Skin is warm and dry. Neurological:      Mental Status: He is alert and oriented to person, place, and time.    Psychiatric:         Behavior: Behavior normal.         DIAGNOSTIC RESULTS   LABS:    Labs Reviewed   BASIC METABOLIC PANEL - Abnormal; Notable for the following components:       Result Value    Potassium 2.2 (*)     Chloride 111 (*)     CO2 13 (*)     Glucose 266 (*)     All other components within normal limits    Narrative:     ONEIL Chowdhury  [facility] tel. [phone],  Chemistry results called to and read back by medardo price, 12/29/2020  14:57, by DECDA  Performed at:  [facility] [address], [place], [address]   Phone [phone]   CBC WITH AUTO DIFFERENTIAL - Abnormal; Notable for the following components:    WBC 2.9 (*)     RBC 3.86 (*)     Hemoglobin 9.8 (*)     Hematocrit 31.0 (*)     MCH 25.5 (*)     RDW 23.8 (*)     Lymphocytes Absolute 0.1 (*)     Toxic Granulation Present (*)     Macrocytes Occasional (*)     Polychromasia Occasional (*)     Hypochromia Occasional (*)     Schistocytes Occasional (*)     Juaquin Cells Occasional (*)     Ovalocytes 1+ (*)     Tear Drop Cells Occasional (*)     All other components within normal limits    Narrative:     Performed at:  [facility] [address], [place], [address]   Phone [phone]   PROTIME-INR - Abnormal; Notable for the following components:    Protime 16.2 (*)     INR 1.39 (*)     All other components within normal limits    Narrative:     Performed at:  [facility] [address], [place], [address]   Phone [phone]   HEPATIC FUNCTION PANEL - Abnormal; Notable for the following components:     Total Protein 5.2 (*)     Alb 2.7 (*)     Alkaline Phosphatase 148 (*)     All other components within normal limits    Narrative:     Quentin Salas  [facility] tel. [phone],  Chemistry results called to and read back by medardo price, 12/29/2020  14:57, by DECDA  Performed at:  [facility] [address], [place], [address]   Phone [phone]   LIPASE - Abnormal; Notable for the following components:    Lipase 5.0 (*)     All other components within normal limits    Narrative:     Eva Bonillajorge luis  [facility] tel. [phone],  Chemistry results called to and read back by medardo reed er, 12/29/2020  14:57, by DECDA  Performed at:  [facility]  [address] [facility], [address]   Phone [phone]   POCT GLUCOSE - Abnormal; Notable for the following components:    POC Glucose 245 (*)     All other components within normal limits    Narrative:     Performed at:  [facility]  [address] [facility], [address]   Phone [phone]   MAGNESIUM    Narrative:     Eva Bonillajorge luis  [facility] tel. [phone],  Chemistry results called to and read back by medardo reed er, 12/29/2020  14:57, by DECDA  Performed at:  [facility]  [address] [facility], [address]   Phone [phone]   POCT GLUCOSE       All other labs were within normal range or not returned as of this dictation. EKG: All EKG's are interpreted by the Emergency Department Physician in the absence of a cardiologist.  Please see their note for interpretation of EKG. RADIOLOGY:   Non-plain film images such as CT, Ultrasound and MRI are read by the radiologist. Plain radiographic images are visualized and preliminarily interpreted by the ED Provider with the below findings:        Interpretation per the Radiologist below, if available at the time of this note:    CT HEAD WO CONTRAST   Final Result   No acute intracranial abnormality. XR FOOT RIGHT (MIN 3 VIEWS)   Final Result   Slight soft tissue swelling question in the forefoot. No acute bony   abnormality. Bones are osteoporotic. XR ANKLE RIGHT (MIN 3 VIEWS)   Final Result   Minimally displaced fracture of the lateral malleolus. Possible very small coexistent fracture the periarticular anterior tibia,   versus artifact from overlying shadows.            Xr Ankle Right (min 3 Views)    Result Date: 12/29/2020  EXAMINATION: THREE XRAY VIEWS OF THE RIGHT ANKLE 12/29/2020 3:28 pm COMPARISON: None. HISTORY: ORDERING SYSTEM PROVIDED HISTORY: injury TECHNOLOGIST PROVIDED HISTORY: Reason for exam:->injury Reason for Exam: right ankle pain Acuity: Acute Type of Exam: Initial Mechanism of Injury: fall FINDINGS: There is a minimally displaced fracture of the lateral malleolus. There also appears to be very small fracture involving the periarticular anterior tibia, shown on the lateral image. No dislocation. Minimally displaced fracture of the lateral malleolus. Possible very small coexistent fracture the periarticular anterior tibia, versus artifact from overlying shadows. PROCEDURES   Unless otherwise noted below, none     Procedures  The patient had a fracture of lateral malleolus. A short leg and sugar tong splint was placed by the emergency department technician, it was applied appropriately and the patient was neurovascularly intact as observed by myself. CRITICAL CARE TIME   Critical Care  There was a high probability of life-threatening clinical deterioration in the patient's condition requiring my urgent intervention. Total critical care time with the patient was 35 minutes excluding separately reportable procedures.   Critical care required due to patients hypokalemia, requiring iv and oral replacement admission to hospitalist service      CONSULTS:  None      EMERGENCY DEPARTMENT COURSE and DIFFERENTIAL DIAGNOSIS/MDM:   Vitals:    Vitals:    12/29/20 1730 12/29/20 1800 12/29/20 1849 12/29/20 1903   BP: 125/81 129/71 (!) 140/72 127/84   Pulse: 97 97 82 109   Resp:   16 16   Temp:       TempSrc:       SpO2: 100% 100% 97% 100%   Weight:           Patient was given the following medications:  Medications   HYDROmorphone HCl PF (DILAUDID) injection 0.5 mg (0.5 mg Intravenous Given 12/29/20 1531)   lactated ringers bolus (0 mLs Intravenous Stopped 12/29/20 1530)   metoclopramide (REGLAN) injection 10 mg (10 mg Intravenous Given 12/29/20 1442)   diphenhydrAMINE (BENADRYL) injection 25 mg (25 mg Intravenous Given 12/29/20 1442)   potassium chloride 10 mEq/100 mL IVPB (Peripheral Line) (0 mEq Intravenous Stopped 12/29/20 1646)   potassium bicarb-citric acid (EFFER-K) effervescent tablet 40 mEq (40 mEq Oral Given 12/29/20 1854)           Briefly, this is a 26-year-old male who presents to the emergency department today for evaluation for an abnormal lab test as well as general fatigue. The patient states that he does have a history of pancreatic cancer, last dose was 2 weeks ago. Had labs checked today, and was noted to have a potassium of 2.1. EKG as documented by my attending, please see his note for further details    The family is concerned that the patient had 3 falls 6 days ago, has not had any falls since. He does have some bruising noted to his right foot and right ankle. X-ray does show a fracture of his lateral malleolus, patient does not have any tenderness over his distal tibia. He will be placed in a sugar tong and short leg splint    CBC shows a leukopenia, as well as anemia, and a lymphopenia likely due to his chemotherapy. BMP shows a hypokalemia of 2.2 and a CO2 of 13. Patient was given 2 L of lactated Ringer's, and was given oral and IV potassium. Hepatic function panel, lipase and magnesium are negative. Patient will need to be admitted for his electrolyte disturbance, further care and evaluation. The hospitalist is agreed for admission, the patient is updated and agreeable with plan. Stable for admission. FINAL IMPRESSION      1. Hypokalemia    2. Generalized weakness    3. Diarrhea, unspecified type    4. Closed nondisplaced fracture of lateral malleolus of right fibula, initial encounter          DISPOSITION/PLAN   DISPOSITION Admitted 12/29/2020 05:23:53 PM      PATIENT REFERREDTO:  No follow-up provider specified.     DISCHARGE MEDICATIONS:  New Prescriptions No medications on file       DISCONTINUED MEDICATIONS:  Discontinued Medications    No medications on file              (Please note that portions of this note were completed with a voice recognition program.  Efforts were made to edit the dictations but occasionally words are mis-transcribed.)    Cari Garcia PA-C (electronically signed)            Cari Garcia PA-C  12/29/20 1934

## 2020-12-30 NOTE — CONSULTS
[id number] [facility] Orthopedic Surgery  Consult Note    Patient: Duncan Mei Date: 12/29/2020  Requesting Physician: Anshu Carranza MD  Room: [address] [place], [place] [address]    Chief complaint: RIGHT lateral malleolus fracture    HPI: Lizeth Ruiz is a 63 y.o. male who presented to [facility] ER with c/o fatigue, nausea and diarrhea. He was discharged last Tuesday after GI bleed. He had a couple of  falls about 6 days prior and exhibited swelling over right ankle/foot. He was ambulating at home with a walker because he thought he had a ankle sprain. PMH includes: Pancreatic cancer/chemo ([facility]), gout, HTN, DM II. Describes no pain in ankle or new numbness/tingling. I reviewed previous records including:  Last week's hospitalization records for rectal bleeding     I independently reviewed RIGHT ankle xray which demonstrated:   Minimally displaced fracture of the lateral malleolus.         Patient lives at home and uses walker to ambulate since recent ankle injury.     Medical History:  Past Medical History:   Diagnosis Date    Allergic rhinitis     Cancer ([address]     pancreatic    Gout     Hypertension     resolved, no medications    Prolonged emergence from general anesthesia     slow to wake, w port insertion woke up during    Type II or unspecified type diabetes mellitus without mention of complication, not stated as uncontrolled      Past Surgical History:   Procedure Laterality Date    CYSTOSCOPY  06/19/2018    cysto, green light laser of prostate    ENDOSCOPY, COLON, DIAGNOSTIC      KNEE ARTHROSCOPY Left     OTHER SURGICAL HISTORY  02/26/2018    WHIPPLE and CHOLECYSECTOMY - Dr. Lakia Leon Right 11/2/2020    REMOVAL OF PORT- A -CATHETER performed by Marta Corey MD at [facility] Left 11/2/2020    POWER PORT-A-CATHETER [facility]    ([id number]) performed by Marta Corey MD at [facility] Right 2012    rotator cuff repair    SIGMOIDOSCOPY N/A 12/22/2020    SIGMOIDOSCOPY DIAGNOSTIC FLEXIBLE performed by Mike Batres MD at [address]  02/21/2018       Social History:    reports that he has never smoked. He has never used smokeless tobacco.    Family History:        Problem Relation Age of Onset    High Blood Pressure Father     Heart Disease Father         atrial fibrillation    Cancer Father         colon    Diabetes Maternal Aunt     Diabetes Paternal Grandmother     Diabetes Paternal Grandfather        Medications:  ALL MEDICATIONS HAVE BEEN REVIEWED:  Scheduled:   furosemide  40 mg Oral Daily    potassium chloride  20 mEq Oral BID WC    tamsulosin  0.4 mg Oral BID    insulin glargine  28 Units Subcutaneous QAM    sodium chloride flush  10 mL Intravenous 2 times per day    enoxaparin  40 mg Subcutaneous Daily    insulin lispro  0-12 Units Subcutaneous TID WC    insulin lispro  0-6 Units Subcutaneous Nightly    aspirin  81 mg Oral Daily     Continuous:   sodium chloride 50 mL/hr at 12/30/20 0502    dilTIAZem (CARDIZEM) 125 mg in dextrose 5% 125 mL infusion 5 mg/hr (12/30/20 0728)    sodium chloride      dextrose       PRN:magic (miracle) mouthwash with nystatin, perflutren lipid microspheres, HYDROmorphone, HYDROmorphone, HYDROcodone 5 mg - acetaminophen, potassium chloride **OR** potassium alternative oral replacement **OR** potassium chloride, glucose, dextrose, glucagon (rDNA), dextrose, sodium chloride flush, promethazine **OR** ondansetron, polyethylene glycol, acetaminophen **OR** acetaminophen    Allergies: No Known Allergies    Review of Systems:  Constitutional: Negative for fever, chills; positive for general fatigue   Skin:  Negative for pruritis, rash  Eyes: Negative for photophobia and visual disturbance. ENT:  Negative for rhinorrhea, epistaxis; positive for sore throat; states he \"has thrush\".    Respiratory: Negative for cough and shortness of breath. Cardiovascular: Negative for chest pain. Gastrointestinal: Negative vomiting; positive for nausea and diarrhea. Genitourinary: Negative for dysuria and difficulty urinating. Neurological: Negative for confusion, dysarthria, tremors, seizures. Psychiatric:  Negative for depression or anxiety  Musculoskeletal:  Negative for ankle pain      Objective:  Vitals:    12/30/20 0828   BP: 125/76   Pulse: 106   Resp:    Temp:    SpO2: 100%      Physical Examination:  GENERAL: No apparent distress, thin  SKIN:  Warm and dry  EYES: Nonicteric. ENT: Mucous membranes moist  HEAD: Normocephalic, atraumatic  RESPIRATORY: Resp easy and unlabored  CARDIOVASCULAR: Regular rate and rhythm  GI: Abdomen soft, nontender  NEURO: Awake and alert. No speech defect  PSYCHIATRIC: Appropriate affect; not agitated  MUSCULOSKELETAL:  RIGHT LE  Inspection: Splint in place; appears well-fitting; swelling of great toe  ROM:  Wiggles all toes  Sensation: intact to all toes  Vascular:   Toes pink and warm  Sensory:    Right Upper Extremity:  normal  Left Upper Extremity:  normal  Right Lower Extremity:  normal  Left Lower Extremity:  normal    Labs reviewed:  Recent Labs     12/29/20  1405 12/30/20  0655   WBC 2.9* 6.5   HGB 9.8* 9.6*   HCT 31.0* 29.4*    261     Recent Labs     12/29/20  1405 12/29/20 2052 12/30/20  0655     --  139   K 2.2* 2.7* 2.7*   *  --  114*   CO2 13*  --  14*   BUN 13  --  10   CREATININE 1.2  --  1.3   GLUCOSE 266*  --  257*   CALCIUM 8.7  --  8.8   MG 1.90  --  1.80     Recent Labs     12/29/20  1405   INR 1.39*   PROTIME 16.2*       Lab Results   Component Value Date    COLORU YELLOW 12/22/2020    CLARITYU Clear 12/22/2020    PHUR 7.0 12/22/2020    GLUCOSEU Negative 12/22/2020    BLOODU Negative 12/22/2020    LEUKOCYTESUR SMALL (A) 12/22/2020    BILIRUBINUR Negative 12/22/2020    UROBILINOGEN 0.2 12/22/2020    RBCUA 2 12/22/2020    WBCUA 25 (H) 12/22/2020    BACTERIA 4+ (A) 05/20/2018       Imaging:  CT HEAD WO CONTRAST   Final Result   No acute intracranial abnormality. XR FOOT RIGHT (MIN 3 VIEWS)   Final Result   Slight soft tissue swelling question in the forefoot. No acute bony   abnormality. Bones are osteoporotic. XR ANKLE RIGHT (MIN 3 VIEWS)   Final Result   Minimally displaced fracture of the lateral malleolus. Possible very small coexistent fracture the periarticular anterior tibia,   versus artifact from overlying shadows. IMPRESSION:  Right lateral malleolus fracture  Pancreatic cancer/chemotherapy/thrush  Recent GI bleed  DM  HTN    Active Problems:    Malleolar fracture, right, closed, initial encounter  Resolved Problems:    * No resolved hospital problems. *      RECOMMENDATIONS:  Closed treatment of fracture with tall walking boot and use of walker; will order from [employer] . Weight bear as tolerate in boot  PT/OT   SW consult for discharge disposition  Thrush:he has Magic Mouthwash at bedside  Follow up with Dr. Justin Silverio in 2 weeks; call [phone] for appointment. .      Patient does not use tobacco products. I have reviewed imaging and plan with Dr. Justin Silverio.         GENNY WALLACE, SAUNDRA-CNP  12/30/2020  9:40 AM

## 2020-12-30 NOTE — PROGRESS NOTES
Patient up to 4T. Vital signs stable. Tachycardia 100-105bpm. A&O x4. Assisted to bed and oriented to room. Call light within reach.

## 2020-12-30 NOTE — PROGRESS NOTES
Evaluation:   Behavioral-Environmental Outcomes:  None Identified   Food/Nutrient Intake Outcomes:  Food and Nutrient Intake, Supplement Intake  Physical Signs/Symptoms Outcomes:  Biochemical Data, Weight     Discharge Planning:     Too soon to determine     Electronically signed by Rosmery Ansari RD, LD on 12/30/20 at 11:58 AM EST    Contact: [phone]
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
## 2020-12-30 NOTE — PROGRESS NOTES
1740-1258 systolic Bp remained > 394, -120 bpm. Multiple attempts to [employer]" resulted in replacing both telemon machine and changing telemetry box.

## 2020-12-30 NOTE — PROGRESS NOTES
Pt resting awake in bed. Ace wrap and splint noted to right LE. Able to move toes on command and reports full sensation. Has been having multiple bouts of large amounts off watery diarrhea overnight. Is currently on a cardizem drip due to elevated HR overnight. Bedside monitor in place and being monitored on 3t. PO meds taken easily with water. Echo being performed at bedside.

## 2020-12-30 NOTE — PROGRESS NOTES
[facility]    12/30/2020 11:30 AM        Name: Rosa Phalen . Admitted: 12/29/2020  Primary Care Provider: Mariangel Ruby MD (Tel: [phone])      Subjective:  . Patient feeling poorly. He lives alone however his daughter has been staying with him the past few days as he has fallen 3 times. Seems to correlate with getting up and moving around or slipping he has had profuse diarrhea for several weeks now. He is going to the bathroom every hour. He denies abdominal pain. He states that he cannot eat a lot as it just goes right through him. He denies chest pain he started having some dysphagia and difficulties and painful swallowing yesterday. He was here a week ago with some rectal bleeding and had a flex sig revealing some anal fissures.     Reviewed interval ancillary notes    Current Medications      magic (miracle) mouthwash with nystatin, 4x Daily PRN      0.9 % sodium chloride infusion, Continuous      perflutren lipid microspheres (DEFINITY) injection 1.65 mg, ONCE PRN      sodium chloride 0.9 % infusion,       potassium chloride 10 mEq/100 mL IVPB (Peripheral Line), Q1H      dilTIAZem (CARDIZEM) tablet 30 mg, 4 times per day      HYDROmorphone HCl PF (DILAUDID) injection 0.5 mg, Q30 Min PRN      HYDROmorphone HCl PF (DILAUDID) injection 0.5 mg, Q4H PRN      HYDROcodone-acetaminophen (NORCO) 5-325 MG per tablet 1 tablet, Q4H PRN      potassium chloride (KLOR-CON M) extended release tablet 40 mEq, PRN    Or      potassium bicarb-citric acid (EFFER-K) effervescent tablet 40 mEq, PRN    Or      potassium chloride 10 mEq/100 mL IVPB (Peripheral Line), PRN      furosemide (LASIX) tablet 40 mg, Daily      tamsulosin (FLOMAX) capsule 0.4 mg, BID      insulin glargine (LANTUS;BASAGLAR) injection pen 28 Units, QAM      glucose (GLUTOSE) 40 % oral gel 15 g, PRN     dextrose 50 % IV solution, PRN      glucagon (rDNA) injection 1 mg, PRN      dextrose 5 % solution, PRN      sodium chloride flush 0.9 % injection 10 mL, 2 times per day      sodium chloride flush 0.9 % injection 10 mL, PRN      enoxaparin (LOVENOX) injection 40 mg, Daily      promethazine (PHENERGAN) tablet 12.5 mg, Q6H PRN    Or      ondansetron (ZOFRAN) injection 4 mg, Q6H PRN      polyethylene glycol (GLYCOLAX) packet 17 g, Daily PRN      acetaminophen (TYLENOL) tablet 650 mg, Q6H PRN    Or      acetaminophen (TYLENOL) suppository 650 mg, Q6H PRN      insulin lispro (1 Unit Dial) 0-12 Units, TID WC      insulin lispro (1 Unit Dial) 0-6 Units, Nightly      aspirin chewable tablet 81 mg, Daily        Objective:  /76   Pulse 106   Temp 96.8 °F (36 °C) (Axillary)   Resp 18   Ht 5' 8\" (1.727 m)   Wt 184 lb (83.5 kg)   SpO2 100%   BMI 27.98 kg/m²     Intake/Output Summary (Last 24 hours) at 12/30/2020 1130  Last data filed at 12/30/2020 0653  Gross per 24 hour   Intake 770 ml   Output 1725 ml   Net -955 ml      Wt Readings from Last 3 Encounters:   12/29/20 184 lb (83.5 kg)   12/21/20 200 lb (90.7 kg)   11/02/20 205 lb (93 kg)       General appearance:  Appears comfortable  Eyes: Sclera clear. Pupils equal.  ENT: Moist oral mucosa. Trachea midline, no adenopathy. Cardiovascular: Regular rhythm, normal S1, S2. No murmur. No edema in lower extremities  Respiratory: Not using accessory muscles. Good inspiratory effort. Clear to auscultation bilaterally, no wheeze or crackles. GI: Abdomen soft, no tenderness, not distended, normal bowel sounds  Musculoskeletal: No cyanosis in digits, neck supple  Neurology: CN 2-12 grossly intact. No speech or motor deficits  Psych: Normal affect.  Alert and oriented in time, place and person  Skin: Warm, dry, normal turgor    Labs and Tests:  CBC:   Recent Labs     12/29/20  1405 12/30/20  0655   WBC 2.9* 6.5   HGB 9.8* 9.6*    261     BMP:    Recent Labs     12/29/20  1405 12/29/20  2052 12/30/20  0655     --  139   K 2.2* 2.7* 2.7*   *  --  114*   CO2 13*  --  14*   BUN 13  --  10   CREATININE 1.2  --  1.3   GLUCOSE 266*  --  257*     Hepatic:   Recent Labs     12/29/20  1405   AST 18   ALT 16   BILITOT 0.4   ALKPHOS 148*       CT HEAD WO CONTRAST   Final Result   No acute intracranial abnormality.           XR FOOT RIGHT (MIN 3 VIEWS)   Final Result   Slight soft tissue swelling question in the forefoot. No acute bony   abnormality. Bones are osteoporotic.           XR ANKLE RIGHT (MIN 3 VIEWS)   Final Result   Minimally displaced fracture of the lateral malleolus.       Possible very small coexistent fracture the periarticular anterior tibia,   versus artifact from overlying shadows. CT abd/pelvis 12/21  1. No evidence of acute GI bleed. 2. Prior history of Whipple procedure with no abnormalities of the GI tract   noted otherwise. 3. Stable necrotic lesion in the retroperitoneum anterior to the aorta   suspected to represent a necrotic lymph node.  Correlate with prior oncology   and surgical history. 4. Stable hepatic cyst in the caudate lobe.  A subtle area of low attenuation   in the right hepatic lobe cannot be characterized by CT but does appear   similar to prior exam.  MRI may be considered for further evaluation if   clinically indicated. 5. Bilateral nonobstructing nephrolithiasis. Problem List  Active Problems:    Malleolar fracture, right, closed, initial encounter  Resolved Problems:    * No resolved hospital problems. *       Assessment & Plan:   1. Diarrhea-likely from chemo. Stool studies pending. Imodium/lomotil scheduled if negative. If persistent may need GI eval. Had flex sig last week which just revealed fissures. Start IV fluids until we can get this under control. 2. Metabolic acidosis-start bicarb. Repeat BMP in a.m.  3. Severe hypokalemia-replace. Mag is okay.   Secondary to profuse diarrhea. 4. Severe oral candidiasis-start IV diflucan, continue magic mouthwash with nystatin. 5. afib-on cardizem drip. Discussed with EP, change to po cardizem. 6. R malleolar fracture-non surgical. Ortho on board. PT/OT to assess. 7. Dm 2-continue lantus 28 and medium SSI. a1c 10  8. Syncope, recurrent falls-likely orthostatic hypotension from profuse diarrhea.        Diet: DIET CARB CONTROL;  Code:Full Code  DVT PPX: leslie Mac PA-C   12/30/2020 11:30 AM

## 2020-12-30 NOTE — PROGRESS NOTES
Shift assessment complete. Vital signs stable. Afebrile. Sinus tachycardia on tele. IV K+ replacement infusing. Placed on r/o cdiff precautions due to ongoing diarrhea. Stool sample sent. Urine sample sent. Awaiting results. Splint to right leg. Neuro WNL (see doc flowsheet for detailed assessment). Reports sore throat & thrush present. Magic mouthwash ordered. The care plan and education has been reviewed and mutually agreed upon with the patient. Fall precautions in place. Bed alarm on. Call light within reach. All needs met at this time. Will continue to monitor.
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
## 2020-12-30 NOTE — PROGRESS NOTES
1210- Stopped cardizem drip at 1210 per verbal order from Marquita Seymour NP. Pt now on PO cardizem 30 mg QID. 1240- pt voided per urinal and had water yellow diarrhea in . samples of each collected. 1300- verified with pharmacy that bicarb is compatible with IVP potassium.

## 2020-12-30 NOTE — H&P
arthroscopy (Left); Shoulder Arthroplasty (Right, 2012); Upper gastrointestinal endoscopy (02/21/2018); other surgical history (02/26/2018); Pancreas surgery; Cystocopy (06/19/2018); Endoscopy, colon, diagnostic; Port Surgery (Right, 11/2/2020); Port Surgery (Left, 11/2/2020); and sigmoidoscopy (N/A, 12/22/2020). Medications:  No current facility-administered medications on file prior to encounter.       Current Outpatient Medications on File Prior to Encounter   Medication Sig Dispense Refill    loperamide (IMODIUM) 1 MG/5ML solution Take by mouth 4 times daily as needed for Diarrhea      loperamide (IMODIUM) 2 MG capsule Take 2 mg by mouth 4 times daily as needed for Diarrhea      cyclobenzaprine (FLEXERIL) 5 MG tablet TAKE 1 TABLET BY MOUTH 3  TIMES DAILY AS NEEDED FOR  MUSCLE SPASMS 270 tablet 0    furosemide (LASIX) 40 MG tablet TAKE 1 TABLET BY MOUTH  DAILY 90 tablet 3    potassium chloride (KLOR-CON M) 20 MEQ extended release tablet TAKE 1 TABLET BY MOUTH  TWICE DAILY 180 tablet 3    tamsulosin (FLOMAX) 0.4 MG capsule Take 1 capsule by mouth 2 times daily 180 capsule 1    allopurinol (ZYLOPRIM) 300 MG tablet TAKE 1 TABLET BY MOUTH EVERY DAY 90 tablet 1    ibuprofen (ADVIL;MOTRIN) 800 MG tablet TAKE 1 TABLET BY MOUTH 3 TIMES DAILY WITH MEALS 270 tablet 0    aspirin EC 81 MG EC tablet Take 1 tablet by mouth daily 30 tablet 11    HUMALOG KWIKPEN 100 UNIT/ML SOPN INJECT 6 UNITS  SUBCUTANEOUSLY 3 TIMES  DAILY (BEFORE MEALS) 15 mL 3    insulin glargine (LANTUS;BASAGLAR) 100 UNIT/ML injection pen Inject 28 Units into the skin every morning (Patient taking differently: Inject 33 Units into the skin every morning ) 8 pen 3    Blood Glucose Monitoring Suppl (FREESTYLE LITE) KENTRELL 1 Device by Does not apply route 3 times daily 1 Device 0    blood glucose test strips (FREESTYLE LITE) strip 1 each by In Vitro route 3 times daily 300 each 3    FreeStyle Lancets MISC 1 each by Does not apply route daily 100 each 3    Insulin Pen Needle 32G X 4 MM MISC 1 each by Does not apply route 4 times daily 200 each 3     Current Facility-Administered Medications   Medication Dose Route Frequency Provider Last Rate Last Admin    magic (miracle) mouthwash with nystatin  5 mL Swish & Spit 4x Daily PRN Rayshawn Gasca PA-C   5 mL at 12/30/20 0259    HYDROmorphone HCl PF (DILAUDID) injection 0.5 mg  0.5 mg Intravenous Q30 Min PRN Colton Henriquez PA-C   0.5 mg at 12/29/20 1531    HYDROmorphone HCl PF (DILAUDID) injection 0.5 mg  0.5 mg Intravenous Q4H PRN Danyell Estrada MD   0.5 mg at 12/30/20 0025    HYDROcodone-acetaminophen (NORCO) 5-325 MG per tablet 1 tablet  1 tablet Oral Q4H PRN Danyell Estrada MD        potassium chloride (KLOR-CON M) extended release tablet 40 mEq  40 mEq Oral PRN Danyell Estrada MD        Or   Navya Mendoza potassium bicarb-citric acid (EFFER-K) effervescent tablet 40 mEq  40 mEq Oral PRN Danyell Estrada MD        Or   Navya Mendoza potassium chloride 10 mEq/100 mL IVPB (Peripheral Line)  10 mEq Intravenous PRN Danyell Estrada  mL/hr at 12/30/20 0326 10 mEq at 12/30/20 0326    furosemide (LASIX) tablet 40 mg  40 mg Oral Daily Danyell Estrada MD        potassium chloride (KLOR-CON M) extended release tablet 20 mEq  20 mEq Oral BID  Danyell Estrada MD        tamsulosin [facility]) capsule 0.4 mg  0.4 mg Oral BID Danyell Estrada MD   0.4 mg at 12/29/20 2050    insulin glargine (LANTUS;BASAGLAR) injection pen 28 Units  28 Units Subcutaneous QAM Danyell Estrada MD        glucose (GLUTOSE) 40 % oral gel 15 g  15 g Oral PRN Danyell Estrada MD        dextrose 50 % IV solution  12.5 g Intravenous PRN Danyell Estrada MD        glucagon (rDNA) injection 1 mg  1 mg Intramuscular PRN Danyell Estrada MD        dextrose 5 % solution  100 mL/hr Intravenous PRN Danyell Estrada MD        sodium chloride flush 0.9 % injection 10 mL  10 mL Intravenous 2 times per day Danyell Estrada MD   10 mL at 12/30/20 0005    sodium chloride flush 0.9 % injection 10 mL 10 mL Intravenous PRN Alaina Cruz MD        enoxaparin (LOVENOX) injection 40 mg  40 mg Subcutaneous Daily Alaina Cruz MD   40 mg at 12/29/20 2050    promethazine (PHENERGAN) tablet 12.5 mg  12.5 mg Oral Q6H PRN Alaina Cruz MD        Or    ondansetron (ZOFRAN) injection 4 mg  4 mg Intravenous Q6H PRN Alaina Cruz MD        polyethylene glycol (GLYCOLAX) packet 17 g  17 g Oral Daily PRN Alaina Cruz MD        acetaminophen (TYLENOL) tablet 650 mg  650 mg Oral Q6H PRN Alaina Cruz MD        Or   Rob Flint acetaminophen (TYLENOL) suppository 650 mg  650 mg Rectal Q6H PRN Alaina Cruz MD        insulin lispro (1 Unit Dial) 0-12 Units  0-12 Units Subcutaneous TID WC Alaina Cruz MD   Stopped at 12/29/20 2028    insulin lispro (1 Unit Dial) 0-6 Units  0-6 Units Subcutaneous Nightly Alaina Cruz MD   3 Units at 12/29/20 2049    aspirin chewable tablet 81 mg  81 mg Oral Daily Kell Campbell PA-C         Allergies:  No Known Allergies     Social History:   reports that he has never smoked. He has never used smokeless tobacco. He reports that he does not drink alcohol or use drugs. Family History:  family history includes Cancer in his father; Diabetes in his maternal aunt, paternal grandfather, and paternal grandmother; Heart Disease in his father; High Blood Pressure in his father. ,     Physical Exam:  /79   Pulse 107   Temp 96.5 °F (35.8 °C) (Axillary)   Resp 15   Ht 5' 8\" (1.727 m)   Wt 184 lb (83.5 kg)   SpO2 96%   BMI 27.98 kg/m²     General appearance:  Appears comfortable. Well nourished  Eyes: Sclera clear, pupils equal  ENT: Moist mucus membranes, no thrush. Trachea midline. Cardiovascular: Regular rhythm, normal S1, S2. No murmur, gallop, rub.  No edema in lower extremities  Respiratory: Clear to auscultation bilaterally, no wheeze, good inspiratory effort  Gastrointestinal: Abdomen soft, non-tender, not distended, normal bowel sounds  Musculoskeletal: No cyanosis in digits, neck supple  Neurology: Cranial nerves grossly intact. Alert and oriented in time, place and person. No speech or motor deficits  Psychiatry: Appropriate affect. Not agitated  Skin: Warm, dry, normal turgor, no rash    Labs:  CBC:   Lab Results   Component Value Date    WBC 2.9 12/29/2020    RBC 3.86 12/29/2020    HGB 9.8 12/29/2020    HCT 31.0 12/29/2020    MCV 80.5 12/29/2020    MCH 25.5 12/29/2020    MCHC 31.7 12/29/2020    RDW 23.8 12/29/2020     12/29/2020    MPV 6.9 12/29/2020     BMP:    Lab Results   Component Value Date     12/29/2020    K 2.7 12/29/2020    K 4.0 12/22/2020     12/29/2020    CO2 13 12/29/2020    BUN 13 12/29/2020    CREATININE 1.2 12/29/2020    CALCIUM 8.7 12/29/2020    GFRAA >60 12/29/2020    GFRAA >60 11/17/2012    LABGLOM >60 12/29/2020    GLUCOSE 266 12/29/2020       Chest Xray:   EKG:        Problem List  Active Problems:    Malleolar fracture, right, closed, initial encounter  Resolved Problems:    * No resolved hospital problems. *        Assessment/Plan:         1. Hypokalemia K 2.2  Likely d/t GI losses  Replace per protocol  Cont to monitor    Diarrhea  Stool studies and c diff pending    Fall and R malleolar fx  Pain control  Splint in place  Pt/ot     Pancreatic cancer on chemo  Oncology consulted    Admit as inpatien. I anticipate hospitalization spanning more than two midnights for investigation and treatment of the above medically necessary diagnoses.       Derrek Palma MD    [id number]

## 2020-12-30 NOTE — PROGRESS NOTES
HR sustaining 150s-170 while ambulating up to bathroom. HR 90s while at rest. Secure perfect serve message sent to hospitalist regarding elevated HR. EKG and IVF @50 ordered.

## 2020-12-31 NOTE — PROGRESS NOTES
[facility]   Progress Note      12/31/2020 8:30 AM        Name: Angela Roque . Admitted: 12/29/2020    SUBJECTIVE:  He remains weak and tired, continues to have diarrhea, patient states frequency has decreased, mouth remains sore.      Reviewed interval ancillary notes    Current Medications      magnesium sulfate 2 g in 50 mL IVPB premix, Once      potassium chloride (KLOR-CON M) extended release tablet 40 mEq, PRN    Or      potassium bicarb-citric acid (EFFER-K) effervescent tablet 40 mEq, PRN    Or      potassium chloride 10 mEq/100 mL IVPB (Peripheral Line), PRN      potassium bicarb-citric acid (EFFER-K) effervescent tablet 40 mEq, BID      magic (miracle) mouthwash with nystatin, 4x Daily PRN      0.9 % sodium chloride infusion, Continuous      perflutren lipid microspheres (DEFINITY) injection 1.65 mg, ONCE PRN      dilTIAZem (CARDIZEM) tablet 30 mg, 4 times per day      sodium bicarbonate 150 mEq in dextrose 5 % 1,000 mL infusion, Continuous      fluconazole (DIFLUCAN) in 0.9 % sodium chloride IVPB 200 mg, Q24H      diphenoxylate-atropine (LOMOTIL) 2.5-0.025 MG per tablet 1 tablet, 4x Daily PRN      octreotide (SANDOSTATIN) injection 100 mcg, TID PRN      HYDROmorphone HCl PF (DILAUDID) injection 0.5 mg, Q30 Min PRN      HYDROmorphone HCl PF (DILAUDID) injection 0.5 mg, Q4H PRN      HYDROcodone-acetaminophen (NORCO) 5-325 MG per tablet 1 tablet, Q4H PRN      furosemide (LASIX) tablet 40 mg, Daily      tamsulosin (FLOMAX) capsule 0.4 mg, BID      insulin glargine (LANTUS;BASAGLAR) injection pen 28 Units, QAM      glucose (GLUTOSE) 40 % oral gel 15 g, PRN      dextrose 50 % IV solution, PRN      glucagon (rDNA) injection 1 mg, PRN      dextrose 5 % solution, PRN      sodium chloride flush 0.9 % injection 10 mL, 2 times per day      sodium chloride flush 0.9 % injection 10 mL, PRN      enoxaparin (LOVENOX) injection 40 mg, Daily      promethazine (PHENERGAN) tablet 12.5 mg, Q6H PRN    Or      ondansetron (ZOFRAN) injection 4 mg, Q6H PRN      polyethylene glycol (GLYCOLAX) packet 17 g, Daily PRN      acetaminophen (TYLENOL) tablet 650 mg, Q6H PRN    Or      acetaminophen (TYLENOL) suppository 650 mg, Q6H PRN      insulin lispro (1 Unit Dial) 0-12 Units, TID WC      insulin lispro (1 Unit Dial) 0-6 Units, Nightly      aspirin chewable tablet 81 mg, Daily        Objective:  /71   Pulse 104   Temp 97.6 °F (36.4 °C) (Oral)   Resp 16   Ht 5' 8\" (1.727 m)   Wt 184 lb (83.5 kg)   SpO2 97%   BMI 27.98 kg/m²     Intake/Output Summary (Last 24 hours) at 12/31/2020 0830  Last data filed at 12/31/2020 0420  Gross per 24 hour   Intake 3696 ml   Output 2850 ml   Net 846 ml      Wt Readings from Last 3 Encounters:   12/29/20 184 lb (83.5 kg)   12/21/20 200 lb (90.7 kg)   11/02/20 205 lb (93 kg)       General appearance:  Appears comfortable  Eyes: Sclera clear. Pupils equal.  ENT: Moist oral mucosa. Trachea midline, no adenopathy. Cardiovascular: Regular rhythm, normal S1, S2. No murmur. No edema in lower extremities  Respiratory: Not using accessory muscles. Good inspiratory effort. Clear to auscultation bilaterally, no wheeze or crackles. GI: Abdomen soft, no tenderness, not distended  Musculoskeletal: No cyanosis in digits, neck supple  Neurology: CN 2-12 grossly intact. No speech or motor deficits  Psych: Normal affect.  Alert and oriented in time, place and person  Skin: Warm, dry, normal turgor    Labs and Tests:  CBC:   Recent Labs     12/29/20  1405 12/30/20  0655 12/31/20  0600   WBC 2.9* 6.5 5.1   HGB 9.8* 9.6* 8.4*    261 187     BMP:    Recent Labs     12/30/20  0655 12/30/20  1815 12/31/20  0600    138 140   K 2.7* 2.7* 2.6*   * 114* 111*   CO2 14* 16* 21   BUN 10 8 8   CREATININE 1.3 1.4* 1.3   GLUCOSE 257* 209* 162*     Hepatic:   Recent Labs     12/29/20  1405   AST 18   ALT 16   BILITOT 0.4   ALKPHOS 148* ASSESSMENT AND PLAN    Active Problems:    Diarrhea    Malleolar fracture, right, closed, initial encounter    Hypokalemia    Atrial fibrillation ([facility] [address]  Resolved Problems:    * No resolved hospital problems. 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 Mucositis and likely some oropharyngeal candidiasis. Weight loss. Dehydration. Anemia due to chemotherapy. Hypokalemia. Metabolic acidosis. Diabetes. Metastatic pancreatic carcinoma. Treated with surgery and chemotherapy. Had gemcitabine and abraxane. Recently started on Onivyde.      CBC reviewed, counts stable, he received one dose of Octreotide yesterday, RN planning on giving another dose this morning. Continues to receive Magic mouthwash for mucositis. Potassium to be replaced per protocol .       Lizette Blair, [facility]  [facility]
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
## 2020-12-31 NOTE — PROGRESS NOTES
Occupational Therapy   Occupational Therapy Initial Assessment  Date: 2020   Patient Name: Aidee Sawant  MRN: [id number]     : 1960    Date of Service: 2020    Discharge Recommendations:  Aidee Sawant scored a 19/24 on the AM-PAC ADL Inpatient form. Current research shows that an AM-PAC score of 18 or greater is typically associated with a discharge to the patient's home setting. Based on the patient's AM-PAC score, and their current ADL deficits, it is recommended that the patient have 2-3 sessions per week of Occupational Therapy at d/c to increase the patient's independence. At this time, this patient demonstrates the endurance and safety to discharge home with home services and a follow up treatment frequency of 2-3x/wk. Please see assessment section for further patient specific details. HOME HEALTH CARE: LEVEL 3 SAFETY     - Initial home health evaluation to occur within 24-48 hours, in patient home   - Therapy evaluations in home within 24-48 hours of discharge; including DME and home safety   - Frontload therapy 5 days, then 3x a week   - Therapy to evaluate if patient has [address] needs for personal care   -  evaluation within 24-48 hours, includes evaluation of resources and insurance to determine [place], [place], LTC, and Medicaid options       If patient discharges prior to next session this note will serve as a discharge summary. Please see below for the latest assessment towards goals. OT Equipment Recommendations  Equipment Needed: Yes  Mobility Devices: ADL Assistive Devices  ADL Assistive Devices: Shower Chair with back    Assessment   Performance deficits / Impairments: Decreased functional mobility ; Decreased endurance;Decreased ADL status; Decreased high-level IADLs  Assessment: Pt is below his baseline level of occupational function, based on the above deficits associated with R closed malleolar fx.  Pt would benefit from continued skilled acute OT services to address these deficits. Treatment Diagnosis: Decreaed ADL/IADL status, endurance and functional mobility associated with R closed malleolar fx  Prognosis: Good  Decision Making: Medium Complexity  History: Pt 62 yo, lives alone with assist of daughters, stays on main level, independent ADLs/IADLs, ambulation. 4 recent falls. PMH: Pancreatic Ca, HTN, DM2, gout, whipple & jose ramon 2/18, R rot cuff repair, R portacath, cehmo  Exam: 6 clicks, 4 performance deficits/impairments, evolving presentation  Assistance / Modification: Min A bed mobility, CGA transfers, S/U don boot, I socks  OT Education: OT Role;Plan of Care;Transfer Training;Equipment  Patient Education: Purpose of OT eval, d/c recommendation. Pt independently verbalized and demonstrated understanding. Barriers to Learning: None  REQUIRES OT FOLLOW UP: Yes  Activity Tolerance  Activity Tolerance: Patient Tolerated treatment well  Safety Devices  Safety Devices in place: Yes  Type of devices: Nurse notified;Call light within reach; Left in chair           Patient Diagnosis(es): The primary encounter diagnosis was Hypokalemia. Diagnoses of Generalized weakness, Diarrhea, unspecified type, and Closed nondisplaced fracture of lateral malleolus of right fibula, initial encounter were also pertinent to this visit. has a past medical history of Allergic rhinitis, Cancer ([facility] [address] Gout, Hypertension, Prolonged emergence from general anesthesia, and Type II or unspecified type diabetes mellitus without mention of complication, not stated as uncontrolled. has a past surgical history that includes Knee arthroscopy (Left); Shoulder Arthroplasty (Right, 2012); Upper gastrointestinal endoscopy (02/21/2018); other surgical history (02/26/2018); Pancreas surgery; Cystocopy (06/19/2018); Endoscopy, colon, diagnostic; Port Surgery (Right, 11/2/2020); Port Surgery (Left, 11/2/2020); and sigmoidoscopy (N/A, 12/22/2020).     Treatment Diagnosis: Decreaed ADL/IADL status, endurance and functional mobility associated with R closed malleolar fx      Restrictions  Restrictions/Precautions  Restrictions/Precautions: Weight Bearing, Fall Risk  Required Braces or Orthoses?: Yes  Lower Extremity Weight Bearing Restrictions  Right Lower Extremity Weight Bearing: Weight Bearing As Tolerated(FWBAT with boot)  Required Braces or Orthoses  Right Lower Extremity Brace: Boot  RLE Brace Type: Tall walking boot  Position Activity Restriction  Other position/activity restrictions: Kennedy Altamirano is a 63 y.o. male who presents to the emergency department today for evaluation for general fatigue, nausea, and diarrhea. The patient has a history of pancreatic cancer, is followed by Dr. Edin Desouza, oncology. The patient states that for the past few days he has had increasing fatigue, and multiple episodes of diarrhea. There has been no blood in the stool or black tarry appearance of the stool. He was recently admitted for GI bleed, colonoscopy at that time did show anal fissures and hemorrhoids. The patient was sent home from the hospital last Tuesday. He has not really been eating or drinking much since being home. The family states that the patient had 3 falls 6 days ago, and family is concerned about the bruising to the ankle as well as to the foot. The patient did fall and hit his head 1 time next days ago, but there is no loss of consciousness. No vomiting. The patient has been acting normally since. Patient has no headaches the patient was supposed to have his therapy today, however after seeing his oncologist, having basic labs drawn, he was told to come to the emergency room because his potassium was low. The patient is complaining of generalized abdominal pain which he is rating is a 6/10. States that this is due to his normal pain that he has. He is not had any vomiting. He has no chest pain or shortness of breath. He has no fever chills.   He has no cough or congestion. He has no urinary symptoms. Subjective   General  Chart Reviewed: Yes  Additional Pertinent Hx: Pancreatic Ca, chemotherapy  Family / Caregiver Present: No  Referring Practitioner: Suze Tapia MD, for d/c planning  Diagnosis: R closed malleolar fx  Subjective  Subjective: Pt supine in bed, agreeable to OT eval. Pt reports 5/10 pain R ankle. RN called for pain medication. Patient Currently in Pain: Yes  Pain Assessment  Pain Assessment: 0-10  Pain Level: 5  Pain Type: Acute pain  Pain Location: Ankle  Pain Orientation: Right  Pre Treatment Pain Screening  Intervention List: Patient able to continue with treatment;Nurse called to administer meds  Vital Signs  Patient Currently in Pain: Yes  Social/Functional History  Social/Functional History  Lives With: Alone(one or another daughter staying with patient recently)  Type of Home: House  Home Layout: Two level, Performs ADL's on one level, Able to Live on Main level with bedroom/bathroom  Home Access: Stairs to enter with rails  Entrance Stairs - Number of Steps: 2 TRENT  Entrance Stairs - Rails: Both  Bathroom Shower/Tub: Walk-in shower  Bathroom Toilet: Standard  Bathroom Equipment: Built-in shower seat, Grab bars in shower, Hand-held shower  Home Equipment: Rolling walker, Reacher  ADL Assistance: Independent  Homemaking Assistance: Independent  Homemaking Responsibilities: Yes  Ambulation Assistance: Independent(normally no AD, had 3 recent falls and elected to use RW)  Transfer Assistance: Independent  Active : Yes  Additional Comments: Pt reports 4 recents falls.        Objective   Vision: Impaired  Vision Exceptions: Wears glasses for reading  Hearing: Within functional limits    Orientation  Overall Orientation Status: Within Normal Limits  Observation/Palpation  Posture: Fair  Balance  Sitting Balance: Independent  Standing Balance: Contact guard assistance(w/RW)  Standing Balance  Time: 3-4 min  Activity: functional mobility ~15-20 ft in room  Functional Mobility  Functional - Mobility Device: Rolling Walker  Activity: Other  Assist Level: Contact guard assistance  Functional Mobility Comments: Gait uneven, d/t boot on R LE, sock on L  ADL  Grooming: Setup(wash face)  UE Bathing: Setup(seated on EOB)  LE Dressing: Setup(don boot, independent don/doff socks)  Additional Comments: Pt declined further ADLs  Tone RUE  RUE Tone: Normotonic  Tone LUE  LUE Tone: Normotonic  Coordination  Movements Are Fluid And Coordinated: Yes     Bed mobility  Supine to Sit: Minimal assistance(HOB elevated; pt pulled on PT's arm.)  Scooting: Independent  Transfers  Stand Step Transfers: Contact guard assistance(w/RW)  Sit to stand: Contact guard assistance  Stand to sit: Contact guard assistance  Vision - Basic Assessment  Prior Vision: Wears glasses only for reading  Visual History: No significant visual history  Patient Visual Report: No visual complaint reported.   Cognition  Overall Cognitive Status: WNL  Perception  Overall Perceptual Status: WFL     Sensation  Overall Sensation Status: WFL        LUE AROM (degrees)  LUE AROM : WFL(for bed mobility, transfers, bathing/dressing; NT)  Left Hand AROM (degrees)  Left Hand AROM: WNL  RUE AROM (degrees)  RUE AROM : WFL(for bed mobility, transfers, bathing/dressing; NT)  Right Hand AROM (degrees)  Right Hand AROM: WNL  LUE Strength  Gross LUE Strength: (for bed mobility, transfers, bathing/dressing; NT)  L Hand General: NT(WFL)  RUE Strength  Gross RUE Strength: WFL(for bed mobility, transfers, bathing/dressing; NTfor bed mobility, transfers, bathing/dressing; NT)  R Hand General: NT(WFL)                   Plan   Plan  Times per week: 7  Times per day: Daily  Current Treatment Recommendations: Self-Care / ADL, Endurance Training, Functional Mobility Training, Safety Education & Training    AM-PAC Score        AM-[facility] Inpatient Daily Activity Raw Score: 19 (12/31/20 1101)  AM-PAC Inpatient ADL T-Scale Score : 40.22 (12/31/20 1101)  ADL Inpatient CMS 0-100% Score: 42.8 (12/31/20 1101)  ADL Inpatient CMS G-Code Modifier : CK (12/31/20 1101)    Goals  Short term goals  Time Frame for Short term goals: Discharge  Short term goal 1: Mod I for functional transfers to ADL surfaces w/RW  Short term goal 2: Mod I for functional mobility for ADL activity w/RW  Short term goal 3: Mod I for UB bathing/dressing  Short term goal 4: Mod I for LB bathing/dressingt  Short term goal 5: T to tolerate standing 5 min for ADL activity       Therapy Time   Individual Concurrent Group Co-treatment   Time In 0831         Time Out 0918         Minutes 47               Timed Code Treatment Minutes:  32 Min    Total Treatment Minutes:  LESLYE Cardozo., LESLYER/L, [id number]

## 2020-12-31 NOTE — PROGRESS NOTES
Physical Therapy    Facility/Department: [address] ORTHO/NEURO NURSING  Initial Assessment    NAME: Rolando Posey  : 1960  MRN: [id number]    Date of Service: 2020    Discharge Recommendations:[address] [facility] [place] scored a 18/24 on the AM-PAC short mobility form. Current research shows that an AM-PAC score of 18 or greater is typically associated with a discharge to the patient's home setting. Based on the patient's AM-PAC score and their current functional mobility deficits, it is recommended that the patient have 2-3 sessions per week of Physical Therapy at d/c to increase the patient's independence. At this time, this patient demonstrates the endurance and safety to discharge home with home  services and a follow up treatment frequency of 2-3x/wk. Please see assessment section for further patient specific details. If patient discharges prior to next session this note will serve as a discharge summary. Please see below for the latest assessment towards goals. HOME HEALTH CARE: LEVEL 3 SAFETY     - Initial home health evaluation to occur within 24-48 hours, in patient home   - Therapy evaluations in home within 24-48 hours of discharge; including DME and home safety   - Frontload therapy 5 days, then 3x a week   - Therapy to evaluate if patient has [address] needs for personal care   -  evaluation within 24-48 hours, includes evaluation of resources and insurance to determine AL, IL, LTC, and Medicaid options         PT Equipment Recommendations  Equipment Needed: No  Other: Pt has RW at home    Assessment   Body structures, Functions, Activity limitations: Decreased functional mobility ; Decreased ADL status; Decreased ROM; Decreased strength;Decreased endurance;Decreased balance; Increased pain  Assessment: Pt presents as below his baseline function and would benefit from skilled PT services to promote safe return to PLOF.   Prognosis: Good  Decision Making: Medium anal fissures and hemorrhoids. The patient was sent home from the hospital last Tuesday. He has not really been eating or drinking much since being home. The family states that the patient had 3 falls 6 days ago, and family is concerned about the bruising to the ankle as well as to the foot. The patient did fall and hit his head 1 time next days ago, but there is no loss of consciousness. No vomiting. The patient has been acting normally since. Patient has no headaches the patient was supposed to have his therapy today, however after seeing his oncologist, having basic labs drawn, he was told to come to the emergency room because his potassium was low. The patient is complaining of generalized abdominal pain which he is rating is a 6/10. States that this is due to his normal pain that he has. He is not had any vomiting. He has no chest pain or shortness of breath. He has no fever chills. He has no cough or congestion. He has no urinary symptoms.   Vision/Hearing  Vision: Impaired  Vision Exceptions: Wears glasses for reading  Hearing: Within functional limits     Subjective  General  Chart Reviewed: Yes  Family / Caregiver Present: No  Diagnosis: R Malleolar fracture, hypokalemia  Follows Commands: Within Functional Limits  General Comment  Comments: Pt supine in bed upon arrival.  Subjective  Subjective: Pt agreeable to PT/OT eval.  Pain Screening  Patient Currently in Pain: Yes  Pain Assessment  Pain Assessment: 0-10  Pain Level: 5  Vital Signs  Patient Currently in Pain: Yes       Orientation  Orientation  Overall Orientation Status: Within Functional Limits  Social/Functional History  Social/Functional History  Lives With: Alone(one or another daughter staying with patient recently)  Type of Home: House  Home Layout: Two level, Performs ADL's on one level, Able to Live on Main level with bedroom/bathroom  Home Access: Stairs to enter with rails  Entrance Stairs - Number of Steps: 2 TRENT  Entrance Stairs - Rails: Both  Bathroom Shower/Tub: Walk-in shower  Bathroom Toilet: Standard  Bathroom Equipment: Built-in shower seat, Grab bars in shower, Hand-held shower  Home Equipment: Rolling walker, Reacher  ADL Assistance: Independent  Homemaking Assistance: Independent  Homemaking Responsibilities: Yes  Ambulation Assistance: Independent(normally no AD, had 3 recent falls and elected to use RW)  Transfer Assistance: Independent  Active : Yes  Additional Comments: Pt reports 4 recents falls. Cognition        Objective     Observation/Palpation  Posture: Fair    AROM RLE (degrees)  RLE AROM: WFL  AROM LLE (degrees)  LLE AROM : WFL  Strength RLE  Strength RLE: WFL  Strength LLE  Strength LLE: WFL  Tone RLE  RLE Tone: Normotonic  Tone LLE  LLE Tone: Normotonic  Motor Control  Gross Motor?: WFL  Sensation  Overall Sensation Status: WFL  Bed mobility  Supine to Sit: Minimal assistance(pulling up in PT's hand)  Scooting: Independent  Transfers  Sit to Stand: Contact guard assistance  Stand to sit: Contact guard assistance  Ambulation  Ambulation?: Yes  Ambulation 1  Surface: level tile  Device: Rolling Walker  Assistance: Contact guard assistance  Quality of Gait: Pt ambulates with decreased step length, slow panda, wide AALIYAH, decreased step height, decreased heel strike, no LOB. Distance: ~30 ft in room  Stairs/Curb  Stairs?: No     Balance  Posture: Fair  Sitting - Static: Good  Sitting - Dynamic: Good  Standing - Static: Fair;+  Standing - Dynamic: [address]  Times per week: 7x  Times per day: Daily  Current Treatment Recommendations: Strengthening, ROM, Balance Training, Functional Mobility Training, Transfer Training, Endurance Training, Gait Training, Stair training, Safety Education & Training, Patient/Caregiver Education & Training, Equipment Evaluation, Education, & procurement  Safety Devices  Type of devices:  All fall risk precautions in place, Call light within reach, Chair alarm in place, Gait belt, Patient at risk for falls, Left in chair, Nurse notified  Restraints  Initially in place: No    G-Code       OutComes Score                                                  AM-PAC Score  AM-PAC Inpatient Mobility Raw Score : 18 (12/31/20 1103)  AM-PAC Inpatient T-Scale Score : 43.63 (12/31/20 1103)  Mobility Inpatient CMS 0-100% Score: 46.58 (12/31/20 1103)  Mobility Inpatient CMS G-Code Modifier : CK (12/31/20 1103)          Goals  Short term goals  Time Frame for Short term goals:  To be met prior to discharge  Short term goal 1: Pt will complete bed mobility with mod I  Short term goal 2: Pt will complete sit to/from stand with mod I  Short term goal 3: Pt will ambulate 50 ft with LRAD And mod I  Short term goal 4: Pt will navigate up/down 2 steps with HR and supervision       Therapy Time   Individual Concurrent Group Co-treatment   Time In 0834         Time Out 0916         Minutes 42         Timed Code Treatment Minutes: Rashawn Rubio 122, PT   Brigitte Randhawa, [address], DPT, [id number]

## 2020-12-31 NOTE — PROGRESS NOTES
Gave IVP digoxin 250 mcg IVP slowly. Pt resting in bed dozing on and off. Only x 1 episode of diarrhea early this morning. Tolerating clear liquids. Denies need for pain meds at this time. Remains on telemetry. Call light in reach.

## 2020-12-31 NOTE — PROGRESS NOTES
[facility] PROGRESS NOTE    12/31/2020 1:35 PM        Name: Artie Obrien . Admitted: 12/29/2020  Primary Care Provider: Jackeline Rhoades MD (Tel: [phone])      Subjective:  . Patient feeling slightly improved today. He did have some bowel movements through the night but has not had one since then. He is tolerating liquids. Denies abdominal pain. Nausea improved. Potassium remains low. Kim Mcgarry He lives alone however his daughter has been staying with him the past few days as he has fallen 3 times. Seems to correlate with getting up and moving around or slipping he has had profuse diarrhea for several weeks now. He is going to the bathroom every hour. He denies abdominal pain. He states that he cannot eat a lot as it just goes right through him. He denies chest pain he started having some dysphagia and difficulties and painful swallowing yesterday. He was here a week ago with some rectal bleeding and had a flex sig revealing some anal fissures.     Reviewed interval ancillary notes    Current Medications      potassium chloride (KLOR-CON M) extended release tablet 40 mEq, PRN    Or      potassium bicarb-citric acid (EFFER-K) effervescent tablet 40 mEq, PRN    Or      potassium chloride 10 mEq/100 mL IVPB (Peripheral Line), PRN      potassium bicarb-citric acid (EFFER-K) effervescent tablet 40 mEq, BID      magic (miracle) mouthwash with nystatin, 4x Daily PRN      0.9 % sodium chloride infusion, Continuous      perflutren lipid microspheres (DEFINITY) injection 1.65 mg, ONCE PRN      dilTIAZem (CARDIZEM) tablet 30 mg, 4 times per day      sodium bicarbonate 150 mEq in dextrose 5 % 1,000 mL infusion, Continuous      fluconazole (DIFLUCAN) in 0.9 % sodium chloride IVPB 200 mg, Q24H      diphenoxylate-atropine (LOMOTIL) 2.5-0.025 MG per tablet 1 tablet, 4x Daily PRN      octreotide (SANDOSTATIN) injection 100 mcg, TID PRN      HYDROmorphone HCl PF (DILAUDID) injection 0.5 mg, Q30 Min PRN      HYDROmorphone HCl PF (DILAUDID) injection 0.5 mg, Q4H PRN      HYDROcodone-acetaminophen (NORCO) 5-325 MG per tablet 1 tablet, Q4H PRN      furosemide (LASIX) tablet 40 mg, Daily      tamsulosin (FLOMAX) capsule 0.4 mg, BID      insulin glargine (LANTUS;BASAGLAR) injection pen 28 Units, QAM      glucose (GLUTOSE) 40 % oral gel 15 g, PRN      dextrose 50 % IV solution, PRN      glucagon (rDNA) injection 1 mg, PRN      dextrose 5 % solution, PRN      sodium chloride flush 0.9 % injection 10 mL, 2 times per day      sodium chloride flush 0.9 % injection 10 mL, PRN      enoxaparin (LOVENOX) injection 40 mg, Daily      promethazine (PHENERGAN) tablet 12.5 mg, Q6H PRN    Or      ondansetron (ZOFRAN) injection 4 mg, Q6H PRN      polyethylene glycol (GLYCOLAX) packet 17 g, Daily PRN      acetaminophen (TYLENOL) tablet 650 mg, Q6H PRN    Or      acetaminophen (TYLENOL) suppository 650 mg, Q6H PRN      insulin lispro (1 Unit Dial) 0-12 Units, TID WC      insulin lispro (1 Unit Dial) 0-6 Units, Nightly      aspirin chewable tablet 81 mg, Daily        Objective:  BP (!) 94/54   Pulse 69   Temp 98.3 °F (36.8 °C) (Oral)   Resp 16   Ht 5' 8\" (1.727 m)   Wt 184 lb (83.5 kg)   SpO2 94%   BMI 27.98 kg/m²     Intake/Output Summary (Last 24 hours) at 12/31/2020 1335  Last data filed at 12/31/2020 0420  Gross per 24 hour   Intake 3096 ml   Output 450 ml   Net 2646 ml      Wt Readings from Last 3 Encounters:   12/29/20 184 lb (83.5 kg)   12/21/20 200 lb (90.7 kg)   11/02/20 205 lb (93 kg)       General appearance:  Appears comfortable  Eyes: Sclera clear. Pupils equal.  ENT: Moist oral mucosa. Trachea midline, no adenopathy. Cardiovascular: Regular rhythm, normal S1, S2. No murmur. No edema in lower extremities  Respiratory: Not using accessory muscles. Good inspiratory effort.  Clear to auscultation bilaterally, no wheeze or crackles. GI: Abdomen soft, no tenderness, not distended, normal bowel sounds  Musculoskeletal: No cyanosis in digits, neck supple  Neurology: CN 2-12 grossly intact. No speech or motor deficits  Psych: Normal affect. Alert and oriented in time, place and person  Skin: Warm, dry, normal turgor    Labs and Tests:  CBC:   Recent Labs     12/29/20  1405 12/30/20  0655 12/31/20  0600   WBC 2.9* 6.5 5.1   HGB 9.8* 9.6* 8.4*    261 187     BMP:    Recent Labs     12/30/20  0655 12/30/20  1815 12/31/20  0600    138 140   K 2.7* 2.7* 2.6*   * 114* 111*   CO2 14* 16* 21   BUN 10 8 8   CREATININE 1.3 1.4* 1.3   GLUCOSE 257* 209* 162*     Hepatic:   Recent Labs     12/29/20  1405   AST 18   ALT 16   BILITOT 0.4   ALKPHOS 148*       CT HEAD WO CONTRAST   Final Result   No acute intracranial abnormality.           XR FOOT RIGHT (MIN 3 VIEWS)   Final Result   Slight soft tissue swelling question in the forefoot. No acute bony   abnormality. Bones are osteoporotic.           XR ANKLE RIGHT (MIN 3 VIEWS)   Final Result   Minimally displaced fracture of the lateral malleolus.       Possible very small coexistent fracture the periarticular anterior tibia,   versus artifact from overlying shadows. CT abd/pelvis 12/21  1. No evidence of acute GI bleed. 2. Prior history of Whipple procedure with no abnormalities of the GI tract   noted otherwise. 3. Stable necrotic lesion in the retroperitoneum anterior to the aorta   suspected to represent a necrotic lymph node.  Correlate with prior oncology   and surgical history. 4. Stable hepatic cyst in the caudate lobe.  A subtle area of low attenuation   in the right hepatic lobe cannot be characterized by CT but does appear   similar to prior exam.  MRI may be considered for further evaluation if   clinically indicated. 5. Bilateral nonobstructing nephrolithiasis.        Problem List  Active Problems:    Diarrhea

## 2020-12-31 NOTE — PLAN OF CARE
Problem: Falls - Risk of:  Goal: Will remain free from falls  Description: Will remain free from falls  Outcome: Ongoing  Goal: Absence of physical injury  Description: Absence of physical injury  Outcome: Ongoing     Problem: Pain:  Goal: Pain level will decrease  Description: Pain level will decrease  Outcome: Ongoing  Goal: Control of acute pain  Description: Control of acute pain  Outcome: Ongoing  Goal: Control of chronic pain  Description: Control of chronic pain  Outcome: Ongoing     Problem: Nutrition  Goal: Optimal nutrition therapy  Outcome: Ongoing     Problem: Falls - Risk of:  Goal: Will remain free from falls  Description: Will remain free from falls  Outcome: Ongoing  Goal: Absence of physical injury  Description: Absence of physical injury  Outcome: Ongoing     Problem: Pain:  Goal: Pain level will decrease  Description: Pain level will decrease  Outcome: Ongoing  Goal: Control of acute pain  Description: Control of acute pain  Outcome: Ongoing  Goal: Control of chronic pain  Description: Control of chronic pain  Outcome: Ongoing     Problem: Nutrition  Goal: Optimal nutrition therapy  Outcome: Ongoing
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
## 2021-01-01 ENCOUNTER — CARE COORDINATION (OUTPATIENT)
Dept: CASE MANAGEMENT | Age: 61
End: 2021-01-01

## 2021-01-01 ENCOUNTER — OFFICE VISIT (OUTPATIENT)
Dept: ORTHOPEDIC SURGERY | Age: 61
End: 2021-01-01

## 2021-01-01 ENCOUNTER — OFFICE VISIT (OUTPATIENT)
Dept: FAMILY MEDICINE CLINIC | Age: 61
End: 2021-01-01
Payer: MEDICARE

## 2021-01-01 ENCOUNTER — HOSPITAL ENCOUNTER (OUTPATIENT)
Dept: ONCOLOGY | Age: 61
Setting detail: INFUSION SERIES
Discharge: HOME | DRG: 393 | End: 2021-05-19
Payer: MEDICARE

## 2021-01-01 ENCOUNTER — HOSPITAL ENCOUNTER (OUTPATIENT)
Dept: ONCOLOGY | Age: 61
Setting detail: INFUSION SERIES
Discharge: HOME | End: 2021-04-30
Payer: MEDICARE

## 2021-01-01 ENCOUNTER — APPOINTMENT (OUTPATIENT)
Dept: GENERAL RADIOLOGY | Age: 61
DRG: 393 | End: 2021-01-01
Payer: MEDICARE

## 2021-01-01 ENCOUNTER — HOSPITAL ENCOUNTER (INPATIENT)
Age: 61
LOS: 4 days | Discharge: HOME | DRG: 808 | End: 2021-03-23
Attending: EMERGENCY MEDICINE | Admitting: INTERNAL MEDICINE
Payer: MEDICARE

## 2021-01-01 ENCOUNTER — OFFICE VISIT (OUTPATIENT)
Dept: CARDIOLOGY CLINIC | Age: 61
End: 2021-01-01
Payer: MEDICARE

## 2021-01-01 ENCOUNTER — APPOINTMENT (OUTPATIENT)
Dept: CT IMAGING | Age: 61
DRG: 393 | End: 2021-01-01
Payer: MEDICARE

## 2021-01-01 ENCOUNTER — HOSPITAL ENCOUNTER (INPATIENT)
Age: 61
LOS: 4 days | Discharge: HOSPICE | DRG: 393 | End: 2021-05-24
Attending: EMERGENCY MEDICINE | Admitting: INTERNAL MEDICINE
Payer: MEDICARE

## 2021-01-01 ENCOUNTER — APPOINTMENT (OUTPATIENT)
Dept: GENERAL RADIOLOGY | Age: 61
DRG: 808 | End: 2021-01-01
Payer: MEDICARE

## 2021-01-01 ENCOUNTER — HOSPITAL ENCOUNTER (OUTPATIENT)
Age: 61
Discharge: HOME | End: 2021-01-26
Payer: MEDICARE

## 2021-01-01 ENCOUNTER — HOSPITAL ENCOUNTER (OUTPATIENT)
Dept: GENERAL RADIOLOGY | Age: 61
Discharge: HOME | End: 2021-01-26
Payer: MEDICARE

## 2021-01-01 VITALS
DIASTOLIC BLOOD PRESSURE: 70 MMHG | BODY MASS INDEX: 25.77 KG/M2 | OXYGEN SATURATION: 97 % | SYSTOLIC BLOOD PRESSURE: 98 MMHG | HEIGHT: 69 IN | WEIGHT: 174 LBS | HEART RATE: 113 BPM

## 2021-01-01 VITALS — HEIGHT: 68 IN | TEMPERATURE: 97.2 F | WEIGHT: 200 LBS | BODY MASS INDEX: 30.31 KG/M2

## 2021-01-01 VITALS
TEMPERATURE: 97.1 F | RESPIRATION RATE: 16 BRPM | HEART RATE: 77 BPM | DIASTOLIC BLOOD PRESSURE: 69 MMHG | SYSTOLIC BLOOD PRESSURE: 110 MMHG

## 2021-01-01 VITALS
HEART RATE: 76 BPM | BODY MASS INDEX: 28.42 KG/M2 | OXYGEN SATURATION: 98 % | TEMPERATURE: 97.8 F | DIASTOLIC BLOOD PRESSURE: 78 MMHG | HEIGHT: 68 IN | RESPIRATION RATE: 18 BRPM | WEIGHT: 187.5 LBS | SYSTOLIC BLOOD PRESSURE: 116 MMHG

## 2021-01-01 VITALS
HEART RATE: 65 BPM | TEMPERATURE: 96.9 F | SYSTOLIC BLOOD PRESSURE: 97 MMHG | DIASTOLIC BLOOD PRESSURE: 61 MMHG | RESPIRATION RATE: 16 BRPM

## 2021-01-01 VITALS
HEIGHT: 68 IN | BODY MASS INDEX: 29.77 KG/M2 | HEART RATE: 114 BPM | DIASTOLIC BLOOD PRESSURE: 81 MMHG | SYSTOLIC BLOOD PRESSURE: 120 MMHG | OXYGEN SATURATION: 96 % | TEMPERATURE: 98 F | RESPIRATION RATE: 15 BRPM | WEIGHT: 196.4 LBS

## 2021-01-01 VITALS
DIASTOLIC BLOOD PRESSURE: 81 MMHG | OXYGEN SATURATION: 95 % | RESPIRATION RATE: 16 BRPM | SYSTOLIC BLOOD PRESSURE: 112 MMHG | BODY MASS INDEX: 27.89 KG/M2 | HEIGHT: 68 IN | WEIGHT: 184 LBS | HEART RATE: 82 BPM | TEMPERATURE: 97.7 F

## 2021-01-01 VITALS
TEMPERATURE: 98.6 F | DIASTOLIC BLOOD PRESSURE: 62 MMHG | OXYGEN SATURATION: 97 % | HEART RATE: 112 BPM | SYSTOLIC BLOOD PRESSURE: 82 MMHG

## 2021-01-01 DIAGNOSIS — R50.81 NEUTROPENIC FEVER (HCC): Primary | ICD-10-CM

## 2021-01-01 DIAGNOSIS — E87.6 HYPOKALEMIA: ICD-10-CM

## 2021-01-01 DIAGNOSIS — K92.2 ACUTE LOWER GI BLEEDING: ICD-10-CM

## 2021-01-01 DIAGNOSIS — R00.0 TACHYCARDIA: Primary | ICD-10-CM

## 2021-01-01 DIAGNOSIS — E11.65 TYPE 2 DIABETES MELLITUS WITH HYPERGLYCEMIA, WITH LONG-TERM CURRENT USE OF INSULIN (HCC): Primary | ICD-10-CM

## 2021-01-01 DIAGNOSIS — D70.9 NEUTROPENIC FEVER (HCC): Primary | ICD-10-CM

## 2021-01-01 DIAGNOSIS — S82.831D OTHER CLOSED FRACTURE OF DISTAL END OF RIGHT FIBULA WITH ROUTINE HEALING, SUBSEQUENT ENCOUNTER: Primary | ICD-10-CM

## 2021-01-01 DIAGNOSIS — I10 ESSENTIAL HYPERTENSION: ICD-10-CM

## 2021-01-01 DIAGNOSIS — C25.9 MALIGNANT NEOPLASM OF PANCREAS, UNSPECIFIED LOCATION OF MALIGNANCY (HCC): ICD-10-CM

## 2021-01-01 DIAGNOSIS — M54.50 ACUTE MIDLINE LOW BACK PAIN, UNSPECIFIED WHETHER SCIATICA PRESENT: ICD-10-CM

## 2021-01-01 DIAGNOSIS — C25.3: ICD-10-CM

## 2021-01-01 DIAGNOSIS — R19.7 DIARRHEA, UNSPECIFIED TYPE: ICD-10-CM

## 2021-01-01 DIAGNOSIS — E87.1 HYPONATREMIA: ICD-10-CM

## 2021-01-01 DIAGNOSIS — I47.1 MULTIFOCAL ATRIAL TACHYCARDIA (HCC): ICD-10-CM

## 2021-01-01 DIAGNOSIS — D61.818 PANCYTOPENIA (HCC): Primary | ICD-10-CM

## 2021-01-01 DIAGNOSIS — C25.0 MALIGNANT NEOPLASM OF HEAD OF PANCREAS (HCC): ICD-10-CM

## 2021-01-01 DIAGNOSIS — S82.891A MALLEOLAR FRACTURE, RIGHT, CLOSED, INITIAL ENCOUNTER: ICD-10-CM

## 2021-01-01 DIAGNOSIS — Z79.4 TYPE 2 DIABETES MELLITUS WITH HYPERGLYCEMIA, WITH LONG-TERM CURRENT USE OF INSULIN (HCC): Primary | ICD-10-CM

## 2021-01-01 DIAGNOSIS — B37.0 CANDIDIASIS OF MOUTH: ICD-10-CM

## 2021-01-01 LAB
A/G RATIO: 1.1 (ref 1.1–2.2)
A/G RATIO: 1.1 (ref 1.1–2.2)
A/G RATIO: 1.2 (ref 1.1–2.2)
A/G RATIO: 1.8 (ref 1.1–2.2)
ABO/RH: NORMAL
ALBUMIN SERPL-MCNC: 2.2 G/DL (ref 3.4–5)
ALBUMIN SERPL-MCNC: 2.3 G/DL (ref 3.4–5)
ALBUMIN SERPL-MCNC: 2.4 G/DL (ref 3.4–5)
ALBUMIN SERPL-MCNC: 2.8 G/DL (ref 3.4–5)
ALP BLD-CCNC: 171 U/L (ref 40–129)
ALP BLD-CCNC: 196 U/L (ref 40–129)
ALP BLD-CCNC: 227 U/L (ref 40–129)
ALP BLD-CCNC: 274 U/L (ref 40–129)
ALT SERPL-CCNC: 17 U/L (ref 10–40)
ALT SERPL-CCNC: 20 U/L (ref 10–40)
ALT SERPL-CCNC: 29 U/L (ref 10–40)
ALT SERPL-CCNC: 32 U/L (ref 10–40)
ANION GAP SERPL CALCULATED.3IONS-SCNC: 3 MMOL/L (ref 3–16)
ANION GAP SERPL CALCULATED.3IONS-SCNC: 4 MMOL/L (ref 3–16)
ANION GAP SERPL CALCULATED.3IONS-SCNC: 5 MMOL/L (ref 3–16)
ANION GAP SERPL CALCULATED.3IONS-SCNC: 5 MMOL/L (ref 3–16)
ANION GAP SERPL CALCULATED.3IONS-SCNC: 6 MMOL/L (ref 3–16)
ANION GAP SERPL CALCULATED.3IONS-SCNC: 7 MMOL/L (ref 3–16)
ANION GAP SERPL CALCULATED.3IONS-SCNC: 7 MMOL/L (ref 3–16)
ANION GAP SERPL CALCULATED.3IONS-SCNC: 8 MMOL/L (ref 3–16)
ANION GAP SERPL CALCULATED.3IONS-SCNC: 9 MMOL/L (ref 3–16)
ANION GAP SERPL CALCULATED.3IONS-SCNC: 9 MMOL/L (ref 3–16)
ANISOCYTOSIS: ABNORMAL
ANTIBODY SCREEN: NORMAL
APTT: 41.6 SEC (ref 24.2–36.2)
AST SERPL-CCNC: 19 U/L (ref 15–37)
AST SERPL-CCNC: 19 U/L (ref 15–37)
AST SERPL-CCNC: 21 U/L (ref 15–37)
AST SERPL-CCNC: 21 U/L (ref 15–37)
ATYPICAL LYMPHOCYTE RELATIVE PERCENT: 4 % (ref 0–6)
BANDED NEUTROPHILS RELATIVE PERCENT: 1 % (ref 0–7)
BANDED NEUTROPHILS RELATIVE PERCENT: 16 % (ref 0–7)
BANDED NEUTROPHILS RELATIVE PERCENT: 2 % (ref 0–7)
BANDED NEUTROPHILS RELATIVE PERCENT: 2 % (ref 0–7)
BASOPHILS ABSOLUTE: 0 K/UL (ref 0–0.2)
BASOPHILS ABSOLUTE: 0.1 K/UL (ref 0–0.2)
BASOPHILS RELATIVE PERCENT: 0 %
BASOPHILS RELATIVE PERCENT: 0.4 %
BASOPHILS RELATIVE PERCENT: 0.5 %
BASOPHILS RELATIVE PERCENT: 0.5 %
BASOPHILS RELATIVE PERCENT: 8 %
BILIRUB SERPL-MCNC: 0.5 MG/DL (ref 0–1)
BILIRUB SERPL-MCNC: 0.6 MG/DL (ref 0–1)
BILIRUB SERPL-MCNC: 1.3 MG/DL (ref 0–1)
BILIRUB SERPL-MCNC: 1.8 MG/DL (ref 0–1)
BILIRUBIN URINE: NEGATIVE
BLOOD BANK DISPENSE STATUS: NORMAL
BLOOD BANK PRODUCT CODE: NORMAL
BLOOD CULTURE, ROUTINE: NORMAL
BLOOD, URINE: NEGATIVE
BPU ID: NORMAL
BUN BLDV-MCNC: 12 MG/DL (ref 7–20)
BUN BLDV-MCNC: 5 MG/DL (ref 7–20)
BUN BLDV-MCNC: 5 MG/DL (ref 7–20)
BUN BLDV-MCNC: 6 MG/DL (ref 7–20)
BUN BLDV-MCNC: 8 MG/DL (ref 7–20)
BUN BLDV-MCNC: 8 MG/DL (ref 7–20)
BUN BLDV-MCNC: 9 MG/DL (ref 7–20)
BUN BLDV-MCNC: 9 MG/DL (ref 7–20)
BURR CELLS: ABNORMAL
C DIFF TOXIN/ANTIGEN: NORMAL
CA 19-9: 37 U/ML (ref 0–35)
CALCIUM SERPL-MCNC: 7.4 MG/DL (ref 8.3–10.6)
CALCIUM SERPL-MCNC: 7.4 MG/DL (ref 8.3–10.6)
CALCIUM SERPL-MCNC: 7.6 MG/DL (ref 8.3–10.6)
CALCIUM SERPL-MCNC: 7.7 MG/DL (ref 8.3–10.6)
CALCIUM SERPL-MCNC: 7.9 MG/DL (ref 8.3–10.6)
CALCIUM SERPL-MCNC: 7.9 MG/DL (ref 8.3–10.6)
CALCIUM SERPL-MCNC: 8 MG/DL (ref 8.3–10.6)
CHLORIDE BLD-SCNC: 100 MMOL/L (ref 99–110)
CHLORIDE BLD-SCNC: 101 MMOL/L (ref 99–110)
CHLORIDE BLD-SCNC: 104 MMOL/L (ref 99–110)
CHLORIDE BLD-SCNC: 105 MMOL/L (ref 99–110)
CHLORIDE BLD-SCNC: 106 MMOL/L (ref 99–110)
CHLORIDE BLD-SCNC: 108 MMOL/L (ref 99–110)
CHLORIDE BLD-SCNC: 108 MMOL/L (ref 99–110)
CHLORIDE BLD-SCNC: 109 MMOL/L (ref 99–110)
CLARITY: CLEAR
CO2: 20 MMOL/L (ref 21–32)
CO2: 21 MMOL/L (ref 21–32)
CO2: 21 MMOL/L (ref 21–32)
CO2: 22 MMOL/L (ref 21–32)
CO2: 23 MMOL/L (ref 21–32)
CO2: 24 MMOL/L (ref 21–32)
CO2: 25 MMOL/L (ref 21–32)
CO2: 25 MMOL/L (ref 21–32)
CO2: 26 MMOL/L (ref 21–32)
CO2: 27 MMOL/L (ref 21–32)
COLOR: YELLOW
CREAT SERPL-MCNC: 0.5 MG/DL (ref 0.8–1.3)
CREAT SERPL-MCNC: 0.6 MG/DL (ref 0.8–1.3)
CREAT SERPL-MCNC: 0.7 MG/DL (ref 0.8–1.3)
CREAT SERPL-MCNC: 0.8 MG/DL (ref 0.8–1.3)
CREAT SERPL-MCNC: 0.8 MG/DL (ref 0.8–1.3)
CREAT SERPL-MCNC: 0.9 MG/DL (ref 0.8–1.3)
CREAT SERPL-MCNC: 1.2 MG/DL (ref 0.8–1.3)
CRYPTOSPORIDIUM ANTIGEN STOOL: NORMAL
CULTURE, BLOOD 2: NORMAL
DESCRIPTION BLOOD BANK: NORMAL
DOHLE BODIES: PRESENT
E HISTOLYTICA ANTIGEN STOOL: NORMAL
EKG ATRIAL RATE: 100 BPM
EKG ATRIAL RATE: 105 BPM
EKG ATRIAL RATE: 108 BPM
EKG ATRIAL RATE: 98 BPM
EKG DIAGNOSIS: NORMAL
EKG P AXIS: 1 DEGREES
EKG P AXIS: 32 DEGREES
EKG P AXIS: 34 DEGREES
EKG P AXIS: 86 DEGREES
EKG P-R INTERVAL: 118 MS
EKG P-R INTERVAL: 120 MS
EKG P-R INTERVAL: 134 MS
EKG P-R INTERVAL: 152 MS
EKG Q-T INTERVAL: 300 MS
EKG Q-T INTERVAL: 320 MS
EKG Q-T INTERVAL: 324 MS
EKG Q-T INTERVAL: 346 MS
EKG QRS DURATION: 78 MS
EKG QRS DURATION: 84 MS
EKG QRS DURATION: 88 MS
EKG QRS DURATION: 94 MS
EKG QTC CALCULATION (BAZETT): 402 MS
EKG QTC CALCULATION (BAZETT): 419 MS
EKG QTC CALCULATION (BAZETT): 428 MS
EKG QTC CALCULATION (BAZETT): 441 MS
EKG R AXIS: -15 DEGREES
EKG R AXIS: -16 DEGREES
EKG R AXIS: -16 DEGREES
EKG R AXIS: 37 DEGREES
EKG T AXIS: 157 DEGREES
EKG T AXIS: 200 DEGREES
EKG T AXIS: 206 DEGREES
EKG T AXIS: 24 DEGREES
EKG VENTRICULAR RATE: 103 BPM
EKG VENTRICULAR RATE: 105 BPM
EKG VENTRICULAR RATE: 108 BPM
EKG VENTRICULAR RATE: 98 BPM
EOSINOPHILS ABSOLUTE: 0 K/UL (ref 0–0.6)
EOSINOPHILS ABSOLUTE: 0.1 K/UL (ref 0–0.6)
EOSINOPHILS ABSOLUTE: 0.1 K/UL (ref 0–0.6)
EOSINOPHILS RELATIVE PERCENT: 0 %
EOSINOPHILS RELATIVE PERCENT: 0 %
EOSINOPHILS RELATIVE PERCENT: 0.6 %
EOSINOPHILS RELATIVE PERCENT: 0.6 %
EOSINOPHILS RELATIVE PERCENT: 0.7 %
EOSINOPHILS RELATIVE PERCENT: 1 %
EOSINOPHILS RELATIVE PERCENT: 6 %
EOSINOPHILS RELATIVE PERCENT: 7 %
EPITHELIAL CELLS, UA: 1 /HPF (ref 0–5)
ESTIMATED AVERAGE GLUCOSE: 154.2 MG/DL
ESTIMATED AVERAGE GLUCOSE: 214.5 MG/DL
GFR AFRICAN AMERICAN: >60
GFR NON-AFRICAN AMERICAN: >60
GI BACTERIAL PATHOGENS BY PCR: NORMAL
GI BACTERIAL PATHOGENS BY PCR: NORMAL
GIARDIA ANTIGEN STOOL: NORMAL
GLOBULIN: 1.6 G/DL
GLOBULIN: 2 G/DL
GLOBULIN: 2 G/DL
GLOBULIN: 2.1 G/DL
GLUCOSE BLD-MCNC: 100 MG/DL (ref 70–99)
GLUCOSE BLD-MCNC: 104 MG/DL (ref 70–99)
GLUCOSE BLD-MCNC: 112 MG/DL (ref 70–99)
GLUCOSE BLD-MCNC: 116 MG/DL (ref 70–99)
GLUCOSE BLD-MCNC: 118 MG/DL (ref 70–99)
GLUCOSE BLD-MCNC: 120 MG/DL (ref 70–99)
GLUCOSE BLD-MCNC: 123 MG/DL (ref 70–99)
GLUCOSE BLD-MCNC: 126 MG/DL (ref 70–99)
GLUCOSE BLD-MCNC: 127 MG/DL (ref 70–99)
GLUCOSE BLD-MCNC: 130 MG/DL (ref 70–99)
GLUCOSE BLD-MCNC: 132 MG/DL (ref 70–99)
GLUCOSE BLD-MCNC: 134 MG/DL (ref 70–99)
GLUCOSE BLD-MCNC: 137 MG/DL (ref 70–99)
GLUCOSE BLD-MCNC: 138 MG/DL (ref 70–99)
GLUCOSE BLD-MCNC: 144 MG/DL (ref 70–99)
GLUCOSE BLD-MCNC: 148 MG/DL (ref 70–99)
GLUCOSE BLD-MCNC: 149 MG/DL (ref 70–99)
GLUCOSE BLD-MCNC: 153 MG/DL (ref 70–99)
GLUCOSE BLD-MCNC: 160 MG/DL (ref 70–99)
GLUCOSE BLD-MCNC: 160 MG/DL (ref 70–99)
GLUCOSE BLD-MCNC: 166 MG/DL (ref 70–99)
GLUCOSE BLD-MCNC: 169 MG/DL (ref 70–99)
GLUCOSE BLD-MCNC: 172 MG/DL (ref 70–99)
GLUCOSE BLD-MCNC: 174 MG/DL (ref 70–99)
GLUCOSE BLD-MCNC: 179 MG/DL (ref 70–99)
GLUCOSE BLD-MCNC: 186 MG/DL (ref 70–99)
GLUCOSE BLD-MCNC: 203 MG/DL (ref 70–99)
GLUCOSE BLD-MCNC: 204 MG/DL (ref 70–99)
GLUCOSE BLD-MCNC: 205 MG/DL (ref 70–99)
GLUCOSE BLD-MCNC: 212 MG/DL (ref 70–99)
GLUCOSE BLD-MCNC: 216 MG/DL (ref 70–99)
GLUCOSE BLD-MCNC: 221 MG/DL (ref 70–99)
GLUCOSE BLD-MCNC: 223 MG/DL (ref 70–99)
GLUCOSE BLD-MCNC: 225 MG/DL (ref 70–99)
GLUCOSE BLD-MCNC: 226 MG/DL (ref 70–99)
GLUCOSE BLD-MCNC: 229 MG/DL (ref 70–99)
GLUCOSE BLD-MCNC: 234 MG/DL (ref 70–99)
GLUCOSE BLD-MCNC: 237 MG/DL (ref 70–99)
GLUCOSE BLD-MCNC: 247 MG/DL (ref 70–99)
GLUCOSE BLD-MCNC: 250 MG/DL (ref 70–99)
GLUCOSE BLD-MCNC: 265 MG/DL (ref 70–99)
GLUCOSE BLD-MCNC: 269 MG/DL (ref 70–99)
GLUCOSE BLD-MCNC: 303 MG/DL (ref 70–99)
GLUCOSE BLD-MCNC: 34 MG/DL (ref 70–99)
GLUCOSE BLD-MCNC: 351 MG/DL (ref 70–99)
GLUCOSE BLD-MCNC: 50 MG/DL (ref 70–99)
GLUCOSE BLD-MCNC: 60 MG/DL (ref 70–99)
GLUCOSE BLD-MCNC: 62 MG/DL (ref 70–99)
GLUCOSE BLD-MCNC: 63 MG/DL (ref 70–99)
GLUCOSE BLD-MCNC: 63 MG/DL (ref 70–99)
GLUCOSE BLD-MCNC: 67 MG/DL (ref 70–99)
GLUCOSE BLD-MCNC: 68 MG/DL (ref 70–99)
GLUCOSE BLD-MCNC: 69 MG/DL (ref 70–99)
GLUCOSE BLD-MCNC: 69 MG/DL (ref 70–99)
GLUCOSE BLD-MCNC: 72 MG/DL (ref 70–99)
GLUCOSE BLD-MCNC: 74 MG/DL (ref 70–99)
GLUCOSE BLD-MCNC: 78 MG/DL (ref 70–99)
GLUCOSE BLD-MCNC: 80 MG/DL (ref 70–99)
GLUCOSE BLD-MCNC: 81 MG/DL (ref 70–99)
GLUCOSE BLD-MCNC: 82 MG/DL (ref 70–99)
GLUCOSE BLD-MCNC: 88 MG/DL (ref 70–99)
GLUCOSE BLD-MCNC: 89 MG/DL (ref 70–99)
GLUCOSE BLD-MCNC: 89 MG/DL (ref 70–99)
GLUCOSE BLD-MCNC: 94 MG/DL (ref 70–99)
GLUCOSE BLD-MCNC: 94 MG/DL (ref 70–99)
GLUCOSE BLD-MCNC: 95 MG/DL (ref 70–99)
GLUCOSE BLD-MCNC: 96 MG/DL (ref 70–99)
GLUCOSE URINE: 500 MG/DL
HBA1C MFR BLD: 7 %
HBA1C MFR BLD: 9.1 %
HCT VFR BLD CALC: 20.1 % (ref 40.5–52.5)
HCT VFR BLD CALC: 22.2 % (ref 40.5–52.5)
HCT VFR BLD CALC: 22.5 % (ref 40.5–52.5)
HCT VFR BLD CALC: 23 % (ref 40.5–52.5)
HCT VFR BLD CALC: 23.2 % (ref 40.5–52.5)
HCT VFR BLD CALC: 23.5 % (ref 40.5–52.5)
HCT VFR BLD CALC: 24.1 % (ref 40.5–52.5)
HCT VFR BLD CALC: 25 % (ref 40.5–52.5)
HCT VFR BLD CALC: 25.7 % (ref 40.5–52.5)
HCT VFR BLD CALC: 26 % (ref 40.5–52.5)
HCT VFR BLD CALC: 26.1 % (ref 40.5–52.5)
HCT VFR BLD CALC: 27.2 % (ref 40.5–52.5)
HCT VFR BLD CALC: 27.3 % (ref 40.5–52.5)
HCT VFR BLD CALC: 29.1 % (ref 40.5–52.5)
HCT VFR BLD CALC: 35.2 % (ref 40.5–52.5)
HEMATOLOGY PATH CONSULT: NO
HEMATOLOGY PATH CONSULT: NORMAL
HEMATOLOGY PATH CONSULT: NORMAL
HEMATOLOGY PATH CONSULT: YES
HEMATOLOGY PATH CONSULT: YES
HEMOGLOBIN: 11.1 G/DL (ref 13.5–17.5)
HEMOGLOBIN: 6.6 G/DL (ref 13.5–17.5)
HEMOGLOBIN: 6.9 G/DL (ref 13.5–17.5)
HEMOGLOBIN: 7.4 G/DL (ref 13.5–17.5)
HEMOGLOBIN: 7.4 G/DL (ref 13.5–17.5)
HEMOGLOBIN: 7.6 G/DL (ref 13.5–17.5)
HEMOGLOBIN: 7.6 G/DL (ref 13.5–17.5)
HEMOGLOBIN: 7.8 G/DL (ref 13.5–17.5)
HEMOGLOBIN: 8 G/DL (ref 13.5–17.5)
HEMOGLOBIN: 8.2 G/DL (ref 13.5–17.5)
HEMOGLOBIN: 8.3 G/DL (ref 13.5–17.5)
HEMOGLOBIN: 8.3 G/DL (ref 13.5–17.5)
HEMOGLOBIN: 8.6 G/DL (ref 13.5–17.5)
HEMOGLOBIN: 8.9 G/DL (ref 13.5–17.5)
HEMOGLOBIN: 9.3 G/DL (ref 13.5–17.5)
HYALINE CASTS: 5 /LPF (ref 0–8)
HYPOCHROMIA: ABNORMAL
HYPOCHROMIA: ABNORMAL
INR BLD: 1.77 (ref 0.86–1.14)
INR BLD: 2.02 (ref 0.86–1.14)
KETONES, URINE: ABNORMAL MG/DL
LACTIC ACID, SEPSIS: 1.3 MMOL/L (ref 0.4–1.9)
LACTIC ACID, SEPSIS: 1.8 MMOL/L (ref 0.4–1.9)
LACTIC ACID, SEPSIS: 2.3 MMOL/L (ref 0.4–1.9)
LACTIC ACID, SEPSIS: 2.4 MMOL/L (ref 0.4–1.9)
LACTIC ACID: 1.1 MMOL/L (ref 0.4–2)
LACTIC ACID: 1.4 MMOL/L (ref 0.4–2)
LEUKOCYTE ESTERASE, URINE: NEGATIVE
LIPASE: 4 U/L (ref 13–60)
LYMPHOCYTES ABSOLUTE: 0 K/UL (ref 1–5.1)
LYMPHOCYTES ABSOLUTE: 0.2 K/UL (ref 1–5.1)
LYMPHOCYTES ABSOLUTE: 0.2 K/UL (ref 1–5.1)
LYMPHOCYTES ABSOLUTE: 0.4 K/UL (ref 1–5.1)
LYMPHOCYTES ABSOLUTE: 1.2 K/UL (ref 1–5.1)
LYMPHOCYTES ABSOLUTE: 2.9 K/UL (ref 1–5.1)
LYMPHOCYTES RELATIVE PERCENT: 1 %
LYMPHOCYTES RELATIVE PERCENT: 13 %
LYMPHOCYTES RELATIVE PERCENT: 15 %
LYMPHOCYTES RELATIVE PERCENT: 34 %
LYMPHOCYTES RELATIVE PERCENT: 44 %
LYMPHOCYTES RELATIVE PERCENT: 7 %
LYMPHOCYTES RELATIVE PERCENT: 7.9 %
LYMPHOCYTES RELATIVE PERCENT: 9.9 %
MAGNESIUM: 1.5 MG/DL (ref 1.8–2.4)
MAGNESIUM: 1.6 MG/DL (ref 1.8–2.4)
MAGNESIUM: 1.7 MG/DL (ref 1.8–2.4)
MAGNESIUM: 1.7 MG/DL (ref 1.8–2.4)
MAGNESIUM: 1.9 MG/DL (ref 1.8–2.4)
MAGNESIUM: 1.9 MG/DL (ref 1.8–2.4)
MAGNESIUM: 2 MG/DL (ref 1.8–2.4)
MAGNESIUM: 2.2 MG/DL (ref 1.8–2.4)
MCH RBC QN AUTO: 24.4 PG (ref 26–34)
MCH RBC QN AUTO: 24.9 PG (ref 26–34)
MCH RBC QN AUTO: 25.1 PG (ref 26–34)
MCH RBC QN AUTO: 25.4 PG (ref 26–34)
MCH RBC QN AUTO: 25.8 PG (ref 26–34)
MCH RBC QN AUTO: 26.1 PG (ref 26–34)
MCH RBC QN AUTO: 26.2 PG (ref 26–34)
MCH RBC QN AUTO: 26.2 PG (ref 26–34)
MCH RBC QN AUTO: 29.2 PG (ref 26–34)
MCH RBC QN AUTO: 29.3 PG (ref 26–34)
MCH RBC QN AUTO: 29.5 PG (ref 26–34)
MCH RBC QN AUTO: 29.6 PG (ref 26–34)
MCH RBC QN AUTO: 29.8 PG (ref 26–34)
MCHC RBC AUTO-ENTMCNC: 30.6 G/DL (ref 31–36)
MCHC RBC AUTO-ENTMCNC: 30.9 G/DL (ref 31–36)
MCHC RBC AUTO-ENTMCNC: 31.5 G/DL (ref 31–36)
MCHC RBC AUTO-ENTMCNC: 31.7 G/DL (ref 31–36)
MCHC RBC AUTO-ENTMCNC: 31.8 G/DL (ref 31–36)
MCHC RBC AUTO-ENTMCNC: 31.8 G/DL (ref 31–36)
MCHC RBC AUTO-ENTMCNC: 31.9 G/DL (ref 31–36)
MCHC RBC AUTO-ENTMCNC: 32.1 G/DL (ref 31–36)
MCHC RBC AUTO-ENTMCNC: 32.1 G/DL (ref 31–36)
MCHC RBC AUTO-ENTMCNC: 32.2 G/DL (ref 31–36)
MCHC RBC AUTO-ENTMCNC: 32.5 G/DL (ref 31–36)
MCHC RBC AUTO-ENTMCNC: 33 G/DL (ref 31–36)
MCHC RBC AUTO-ENTMCNC: 33.2 G/DL (ref 31–36)
MCHC RBC AUTO-ENTMCNC: 33.4 G/DL (ref 31–36)
MCHC RBC AUTO-ENTMCNC: 33.5 G/DL (ref 31–36)
MCV RBC AUTO: 78.1 FL (ref 80–100)
MCV RBC AUTO: 78.2 FL (ref 80–100)
MCV RBC AUTO: 78.6 FL (ref 80–100)
MCV RBC AUTO: 78.8 FL (ref 80–100)
MCV RBC AUTO: 79.9 FL (ref 80–100)
MCV RBC AUTO: 80.5 FL (ref 80–100)
MCV RBC AUTO: 81.6 FL (ref 80–100)
MCV RBC AUTO: 81.8 FL (ref 80–100)
MCV RBC AUTO: 82.5 FL (ref 80–100)
MCV RBC AUTO: 83 FL (ref 80–100)
MCV RBC AUTO: 87.9 FL (ref 80–100)
MCV RBC AUTO: 87.9 FL (ref 80–100)
MCV RBC AUTO: 88.7 FL (ref 80–100)
MCV RBC AUTO: 90.1 FL (ref 80–100)
MCV RBC AUTO: 91.2 FL (ref 80–100)
METAMYELOCYTES RELATIVE PERCENT: 1 %
METAMYELOCYTES RELATIVE PERCENT: 4 %
METAMYELOCYTES RELATIVE PERCENT: 6 %
MICROCYTES: ABNORMAL
MICROCYTES: ABNORMAL
MICROSCOPIC EXAMINATION: YES
MONOCYTES ABSOLUTE: 0.1 K/UL (ref 0–1.3)
MONOCYTES ABSOLUTE: 0.2 K/UL (ref 0–1.3)
MONOCYTES ABSOLUTE: 0.3 K/UL (ref 0–1.3)
MONOCYTES ABSOLUTE: 0.4 K/UL (ref 0–1.3)
MONOCYTES RELATIVE PERCENT: 10 %
MONOCYTES RELATIVE PERCENT: 2 %
MONOCYTES RELATIVE PERCENT: 3 %
MONOCYTES RELATIVE PERCENT: 6 %
MONOCYTES RELATIVE PERCENT: 7.3 %
MONOCYTES RELATIVE PERCENT: 7.9 %
MONOCYTES RELATIVE PERCENT: 9 %
MONOCYTES RELATIVE PERCENT: 9.8 %
MYELOCYTE PERCENT: 1 %
MYELOCYTE PERCENT: 3 %
NEUTROPHILS ABSOLUTE: 0.2 K/UL (ref 1.7–7.7)
NEUTROPHILS ABSOLUTE: 1.3 K/UL (ref 1.7–7.7)
NEUTROPHILS ABSOLUTE: 2 K/UL (ref 1.7–7.7)
NEUTROPHILS ABSOLUTE: 2.8 K/UL (ref 1.7–7.7)
NEUTROPHILS ABSOLUTE: 3.4 K/UL (ref 1.7–7.7)
NEUTROPHILS ABSOLUTE: 4.3 K/UL (ref 1.7–7.7)
NEUTROPHILS ABSOLUTE: 5.3 K/UL (ref 1.7–7.7)
NEUTROPHILS ABSOLUTE: 6.3 K/UL (ref 1.7–7.7)
NEUTROPHILS RELATIVE PERCENT: 22 %
NEUTROPHILS RELATIVE PERCENT: 46 %
NEUTROPHILS RELATIVE PERCENT: 61 %
NEUTROPHILS RELATIVE PERCENT: 79 %
NEUTROPHILS RELATIVE PERCENT: 79.2 %
NEUTROPHILS RELATIVE PERCENT: 83 %
NEUTROPHILS RELATIVE PERCENT: 84.7 %
NEUTROPHILS RELATIVE PERCENT: 90 %
NITRITE, URINE: NEGATIVE
NUCLEATED RED BLOOD CELLS: 2 /100 WBC
NUCLEATED RED BLOOD CELLS: 4 /100 WBC
OVALOCYTES: ABNORMAL
PDW BLD-RTO: 16.8 % (ref 12.4–15.4)
PDW BLD-RTO: 16.8 % (ref 12.4–15.4)
PDW BLD-RTO: 17.1 % (ref 12.4–15.4)
PDW BLD-RTO: 18.1 % (ref 12.4–15.4)
PDW BLD-RTO: 18.2 % (ref 12.4–15.4)
PDW BLD-RTO: 19.7 % (ref 12.4–15.4)
PDW BLD-RTO: 20.3 % (ref 12.4–15.4)
PDW BLD-RTO: 20.4 % (ref 12.4–15.4)
PDW BLD-RTO: 20.4 % (ref 12.4–15.4)
PDW BLD-RTO: 20.6 % (ref 12.4–15.4)
PDW BLD-RTO: 21.3 % (ref 12.4–15.4)
PDW BLD-RTO: 24.8 % (ref 12.4–15.4)
PDW BLD-RTO: 24.8 % (ref 12.4–15.4)
PDW BLD-RTO: 25.1 % (ref 12.4–15.4)
PDW BLD-RTO: 25.2 % (ref 12.4–15.4)
PERFORMED ON: ABNORMAL
PERFORMED ON: NORMAL
PH UA: 6.5 (ref 5–8)
PLATELET # BLD: 11 K/UL (ref 135–450)
PLATELET # BLD: 128 K/UL (ref 135–450)
PLATELET # BLD: 13 K/UL (ref 135–450)
PLATELET # BLD: 140 K/UL (ref 135–450)
PLATELET # BLD: 146 K/UL (ref 135–450)
PLATELET # BLD: 16 K/UL (ref 135–450)
PLATELET # BLD: 179 K/UL (ref 135–450)
PLATELET # BLD: 19 K/UL (ref 135–450)
PLATELET # BLD: 19 K/UL (ref 135–450)
PLATELET # BLD: 21 K/UL (ref 135–450)
PLATELET # BLD: 22 K/UL (ref 135–450)
PLATELET # BLD: 25 K/UL (ref 135–450)
PLATELET # BLD: 26 K/UL (ref 135–450)
PLATELET # BLD: 37 K/UL (ref 135–450)
PLATELET # BLD: 47 K/UL (ref 135–450)
PLATELET SLIDE REVIEW: ABNORMAL
PLATELET SLIDE REVIEW: ADEQUATE
PMV BLD AUTO: 7.1 FL (ref 5–10.5)
PMV BLD AUTO: 7.2 FL (ref 5–10.5)
PMV BLD AUTO: 7.4 FL (ref 5–10.5)
PMV BLD AUTO: 7.5 FL (ref 5–10.5)
PMV BLD AUTO: 7.9 FL (ref 5–10.5)
PMV BLD AUTO: 8 FL (ref 5–10.5)
PMV BLD AUTO: 8.1 FL (ref 5–10.5)
PMV BLD AUTO: 8.2 FL (ref 5–10.5)
PMV BLD AUTO: 8.5 FL (ref 5–10.5)
PMV BLD AUTO: 8.6 FL (ref 5–10.5)
PMV BLD AUTO: 8.9 FL (ref 5–10.5)
POIKILOCYTES: ABNORMAL
POIKILOCYTES: ABNORMAL
POLYCHROMASIA: ABNORMAL
POTASSIUM REFLEX MAGNESIUM: 2.7 MMOL/L (ref 3.5–5.1)
POTASSIUM REFLEX MAGNESIUM: 3 MMOL/L (ref 3.5–5.1)
POTASSIUM REFLEX MAGNESIUM: 3.3 MMOL/L (ref 3.5–5.1)
POTASSIUM REFLEX MAGNESIUM: 3.3 MMOL/L (ref 3.5–5.1)
POTASSIUM REFLEX MAGNESIUM: 3.5 MMOL/L (ref 3.5–5.1)
POTASSIUM REFLEX MAGNESIUM: 3.6 MMOL/L (ref 3.5–5.1)
POTASSIUM SERPL-SCNC: 3.5 MMOL/L (ref 3.5–5.1)
POTASSIUM SERPL-SCNC: 3.7 MMOL/L (ref 3.5–5.1)
POTASSIUM SERPL-SCNC: 4.2 MMOL/L (ref 3.5–5.1)
PRO-BNP: 735 PG/ML (ref 0–124)
PROMYELOCYTES PERCENT: 1 %
PROTEIN UA: ABNORMAL MG/DL
PROTHROMBIN TIME: 20.7 SEC (ref 10–13.2)
PROTHROMBIN TIME: 23.6 SEC (ref 10–13.2)
RBC # BLD: 2.23 M/UL (ref 4.2–5.9)
RBC # BLD: 2.51 M/UL (ref 4.2–5.9)
RBC # BLD: 2.57 M/UL (ref 4.2–5.9)
RBC # BLD: 2.62 M/UL (ref 4.2–5.9)
RBC # BLD: 2.64 M/UL (ref 4.2–5.9)
RBC # BLD: 2.79 M/UL (ref 4.2–5.9)
RBC # BLD: 3.01 M/UL (ref 4.2–5.9)
RBC # BLD: 3.15 M/UL (ref 4.2–5.9)
RBC # BLD: 3.2 M/UL (ref 4.2–5.9)
RBC # BLD: 3.26 M/UL (ref 4.2–5.9)
RBC # BLD: 3.32 M/UL (ref 4.2–5.9)
RBC # BLD: 3.33 M/UL (ref 4.2–5.9)
RBC # BLD: 3.5 M/UL (ref 4.2–5.9)
RBC # BLD: 3.56 M/UL (ref 4.2–5.9)
RBC # BLD: 4.23 M/UL (ref 4.2–5.9)
RBC UA: 1 /HPF (ref 0–4)
REASON FOR REJECTION: NORMAL
REJECTED TEST: NORMAL
SARS-COV-2: NOT DETECTED
SCHISTOCYTES: ABNORMAL
SCHISTOCYTES: ABNORMAL
SLIDE REVIEW: ABNORMAL
SODIUM BLD-SCNC: 132 MMOL/L (ref 136–145)
SODIUM BLD-SCNC: 132 MMOL/L (ref 136–145)
SODIUM BLD-SCNC: 133 MMOL/L (ref 136–145)
SODIUM BLD-SCNC: 133 MMOL/L (ref 136–145)
SODIUM BLD-SCNC: 134 MMOL/L (ref 136–145)
SODIUM BLD-SCNC: 135 MMOL/L (ref 136–145)
SODIUM BLD-SCNC: 136 MMOL/L (ref 136–145)
SODIUM BLD-SCNC: 137 MMOL/L (ref 136–145)
SPECIFIC GRAVITY UA: 1.02 (ref 1–1.03)
TEAR DROP CELLS: ABNORMAL
TOTAL PROTEIN: 4.2 G/DL (ref 6.4–8.2)
TOTAL PROTEIN: 4.4 G/DL (ref 6.4–8.2)
TOXIC GRANULATION: PRESENT
TROPONIN: <0.01 NG/ML
URIC ACID, SERUM: 2.7 MG/DL (ref 3.5–7.2)
URINE CULTURE, ROUTINE: NORMAL
URINE REFLEX TO CULTURE: ABNORMAL
URINE TYPE: ABNORMAL
UROBILINOGEN, URINE: 1 E.U./DL
VACUOLATED NEUTROPHILS: PRESENT
WBC # BLD: 0.1 K/UL (ref 4–11)
WBC # BLD: 0.2 K/UL (ref 4–11)
WBC # BLD: 0.3 K/UL (ref 4–11)
WBC # BLD: 0.8 K/UL (ref 4–11)
WBC # BLD: 1.8 K/UL (ref 4–11)
WBC # BLD: 2.4 K/UL (ref 4–11)
WBC # BLD: 3.6 K/UL (ref 4–11)
WBC # BLD: 3.7 K/UL (ref 4–11)
WBC # BLD: 5 K/UL (ref 4–11)
WBC # BLD: 7.7 K/UL (ref 4–11)
WBC # BLD: 8.4 K/UL (ref 4–11)
WBC UA: 1 /HPF (ref 0–5)
WHITE BLOOD CELLS (WBC), STOOL: NORMAL
WHITE BLOOD CELLS (WBC), STOOL: NORMAL

## 2021-01-01 PROCEDURE — 6370000000 HC RX 637 (ALT 250 FOR IP): Performed by: INTERNAL MEDICINE

## 2021-01-01 PROCEDURE — 83735 ASSAY OF MAGNESIUM: CPT

## 2021-01-01 PROCEDURE — 85025 COMPLETE CBC W/AUTO DIFF WBC: CPT

## 2021-01-01 PROCEDURE — 86850 RBC ANTIBODY SCREEN: CPT

## 2021-01-01 PROCEDURE — 74177 CT ABD & PELVIS W/CONTRAST: CPT

## 2021-01-01 PROCEDURE — 94760 N-INVAS EAR/PLS OXIMETRY 1: CPT

## 2021-01-01 PROCEDURE — 93010 ELECTROCARDIOGRAM REPORT: CPT | Performed by: INTERNAL MEDICINE

## 2021-01-01 PROCEDURE — P9016 RBC LEUKOCYTES REDUCED: HCPCS

## 2021-01-01 PROCEDURE — 87449 NOS EACH ORGANISM AG IA: CPT

## 2021-01-01 PROCEDURE — 6360000002 HC RX W HCPCS: Performed by: INTERNAL MEDICINE

## 2021-01-01 PROCEDURE — 36592 COLLECT BLOOD FROM PICC: CPT

## 2021-01-01 PROCEDURE — 87324 CLOSTRIDIUM AG IA: CPT

## 2021-01-01 PROCEDURE — 86901 BLOOD TYPING SEROLOGIC RH(D): CPT

## 2021-01-01 PROCEDURE — 83036 HEMOGLOBIN GLYCOSYLATED A1C: CPT

## 2021-01-01 PROCEDURE — 6360000002 HC RX W HCPCS: Performed by: NURSE PRACTITIONER

## 2021-01-01 PROCEDURE — 6370000000 HC RX 637 (ALT 250 FOR IP): Performed by: EMERGENCY MEDICINE

## 2021-01-01 PROCEDURE — 87328 CRYPTOSPORIDIUM AG IA: CPT

## 2021-01-01 PROCEDURE — 97116 GAIT TRAINING THERAPY: CPT

## 2021-01-01 PROCEDURE — 6370000000 HC RX 637 (ALT 250 FOR IP): Performed by: PHYSICIAN ASSISTANT

## 2021-01-01 PROCEDURE — 99232 SBSQ HOSP IP/OBS MODERATE 35: CPT | Performed by: NURSE PRACTITIONER

## 2021-01-01 PROCEDURE — P9035 PLATELET PHERES LEUKOREDUCED: HCPCS

## 2021-01-01 PROCEDURE — 1200000000 HC SEMI PRIVATE

## 2021-01-01 PROCEDURE — 2580000003 HC RX 258: Performed by: INTERNAL MEDICINE

## 2021-01-01 PROCEDURE — G8427 DOCREV CUR MEDS BY ELIG CLIN: HCPCS | Performed by: NURSE PRACTITIONER

## 2021-01-01 PROCEDURE — 84550 ASSAY OF BLOOD/URIC ACID: CPT

## 2021-01-01 PROCEDURE — 83605 ASSAY OF LACTIC ACID: CPT

## 2021-01-01 PROCEDURE — 36415 COLL VENOUS BLD VENIPUNCTURE: CPT

## 2021-01-01 PROCEDURE — 85027 COMPLETE CBC AUTOMATED: CPT

## 2021-01-01 PROCEDURE — 6360000002 HC RX W HCPCS: Performed by: PHYSICIAN ASSISTANT

## 2021-01-01 PROCEDURE — 86923 COMPATIBILITY TEST ELECTRIC: CPT

## 2021-01-01 PROCEDURE — 80048 BASIC METABOLIC PNL TOTAL CA: CPT

## 2021-01-01 PROCEDURE — P9047 ALBUMIN (HUMAN), 25%, 50ML: HCPCS | Performed by: INTERNAL MEDICINE

## 2021-01-01 PROCEDURE — 86900 BLOOD TYPING SEROLOGIC ABO: CPT

## 2021-01-01 PROCEDURE — 85730 THROMBOPLASTIN TIME PARTIAL: CPT

## 2021-01-01 PROCEDURE — 6370000000 HC RX 637 (ALT 250 FOR IP): Performed by: NURSE PRACTITIONER

## 2021-01-01 PROCEDURE — 36430 TRANSFUSION BLD/BLD COMPNT: CPT

## 2021-01-01 PROCEDURE — 6360000002 HC RX W HCPCS: Performed by: FAMILY MEDICINE

## 2021-01-01 PROCEDURE — 83690 ASSAY OF LIPASE: CPT

## 2021-01-01 PROCEDURE — 97535 SELF CARE MNGMENT TRAINING: CPT

## 2021-01-01 PROCEDURE — 6370000000 HC RX 637 (ALT 250 FOR IP): Performed by: FAMILY MEDICINE

## 2021-01-01 PROCEDURE — 99233 SBSQ HOSP IP/OBS HIGH 50: CPT | Performed by: INTERNAL MEDICINE

## 2021-01-01 PROCEDURE — 93000 ELECTROCARDIOGRAM COMPLETE: CPT | Performed by: NURSE PRACTITIONER

## 2021-01-01 PROCEDURE — 6360000004 HC RX CONTRAST MEDICATION: Performed by: INTERNAL MEDICINE

## 2021-01-01 PROCEDURE — 97530 THERAPEUTIC ACTIVITIES: CPT

## 2021-01-01 PROCEDURE — 85610 PROTHROMBIN TIME: CPT

## 2021-01-01 PROCEDURE — 83630 LACTOFERRIN FECAL (QUAL): CPT

## 2021-01-01 PROCEDURE — 2580000003 HC RX 258: Performed by: FAMILY MEDICINE

## 2021-01-01 PROCEDURE — 93005 ELECTROCARDIOGRAM TRACING: CPT | Performed by: EMERGENCY MEDICINE

## 2021-01-01 PROCEDURE — U0003 INFECTIOUS AGENT DETECTION BY NUCLEIC ACID (DNA OR RNA); SEVERE ACUTE RESPIRATORY SYNDROME CORONAVIRUS 2 (SARS-COV-2) (CORONAVIRUS DISEASE [COVID-19]), AMPLIFIED PROBE TECHNIQUE, MAKING USE OF HIGH THROUGHPUT TECHNOLOGIES AS DESCRIBED BY CMS-2020-01-R: HCPCS

## 2021-01-01 PROCEDURE — 93970 EXTREMITY STUDY: CPT

## 2021-01-01 PROCEDURE — 87040 BLOOD CULTURE FOR BACTERIA: CPT

## 2021-01-01 PROCEDURE — 81001 URINALYSIS AUTO W/SCOPE: CPT

## 2021-01-01 PROCEDURE — 99283 EMERGENCY DEPT VISIT LOW MDM: CPT

## 2021-01-01 PROCEDURE — 80053 COMPREHEN METABOLIC PANEL: CPT

## 2021-01-01 PROCEDURE — G8417 CALC BMI ABV UP PARAM F/U: HCPCS | Performed by: NURSE PRACTITIONER

## 2021-01-01 PROCEDURE — P9047 ALBUMIN (HUMAN), 25%, 50ML: HCPCS | Performed by: FAMILY MEDICINE

## 2021-01-01 PROCEDURE — 1036F TOBACCO NON-USER: CPT | Performed by: NURSE PRACTITIONER

## 2021-01-01 PROCEDURE — 99213 OFFICE O/P EST LOW 20 MIN: CPT | Performed by: NURSE PRACTITIONER

## 2021-01-01 PROCEDURE — 36591 DRAW BLOOD OFF VENOUS DEVICE: CPT

## 2021-01-01 PROCEDURE — 72110 X-RAY EXAM L-2 SPINE 4/>VWS: CPT

## 2021-01-01 PROCEDURE — 3017F COLORECTAL CA SCREEN DOC REV: CPT | Performed by: NURSE PRACTITIONER

## 2021-01-01 PROCEDURE — 84484 ASSAY OF TROPONIN QUANT: CPT

## 2021-01-01 PROCEDURE — 2580000003 HC RX 258: Performed by: NURSE PRACTITIONER

## 2021-01-01 PROCEDURE — 2500000003 HC RX 250 WO HCPCS: Performed by: PHYSICIAN ASSISTANT

## 2021-01-01 PROCEDURE — 99284 EMERGENCY DEPT VISIT MOD MDM: CPT

## 2021-01-01 PROCEDURE — 71045 X-RAY EXAM CHEST 1 VIEW: CPT

## 2021-01-01 PROCEDURE — 99024 POSTOP FOLLOW-UP VISIT: CPT | Performed by: PHYSICIAN ASSISTANT

## 2021-01-01 PROCEDURE — 2580000003 HC RX 258: Performed by: HOSPITALIST

## 2021-01-01 PROCEDURE — 87505 NFCT AGENT DETECTION GI: CPT

## 2021-01-01 PROCEDURE — G8484 FLU IMMUNIZE NO ADMIN: HCPCS | Performed by: NURSE PRACTITIONER

## 2021-01-01 PROCEDURE — 6360000002 HC RX W HCPCS: Performed by: EMERGENCY MEDICINE

## 2021-01-01 PROCEDURE — 99495 TRANSJ CARE MGMT MOD F2F 14D: CPT | Performed by: FAMILY MEDICINE

## 2021-01-01 PROCEDURE — 87336 ENTAMOEB HIST DISPR AG IA: CPT

## 2021-01-01 PROCEDURE — 84132 ASSAY OF SERUM POTASSIUM: CPT

## 2021-01-01 PROCEDURE — 70450 CT HEAD/BRAIN W/O DYE: CPT

## 2021-01-01 PROCEDURE — 99211 OFF/OP EST MAY X REQ PHY/QHP: CPT

## 2021-01-01 PROCEDURE — 99223 1ST HOSP IP/OBS HIGH 75: CPT | Performed by: INTERNAL MEDICINE

## 2021-01-01 PROCEDURE — 71260 CT THORAX DX C+: CPT

## 2021-01-01 PROCEDURE — 99232 SBSQ HOSP IP/OBS MODERATE 35: CPT | Performed by: INTERNAL MEDICINE

## 2021-01-01 PROCEDURE — 96365 THER/PROPH/DIAG IV INF INIT: CPT

## 2021-01-01 PROCEDURE — 99233 SBSQ HOSP IP/OBS HIGH 50: CPT | Performed by: NURSE PRACTITIONER

## 2021-01-01 PROCEDURE — 6360000004 HC RX CONTRAST MEDICATION: Performed by: PHYSICIAN ASSISTANT

## 2021-01-01 PROCEDURE — 83880 ASSAY OF NATRIURETIC PEPTIDE: CPT

## 2021-01-01 PROCEDURE — 87086 URINE CULTURE/COLONY COUNT: CPT

## 2021-01-01 PROCEDURE — 1111F DSCHRG MED/CURRENT MED MERGE: CPT | Performed by: FAMILY MEDICINE

## 2021-01-01 RX ORDER — INSULIN LISPRO 100 [IU]/ML
0-12 INJECTION, SOLUTION INTRAVENOUS; SUBCUTANEOUS
Status: DISCONTINUED | OUTPATIENT
Start: 2021-01-01 | End: 2021-01-01

## 2021-01-01 RX ORDER — CETIRIZINE HYDROCHLORIDE 10 MG/1
10 TABLET ORAL NIGHTLY
Status: DISCONTINUED | OUTPATIENT
Start: 2021-01-01 | End: 2021-01-01 | Stop reason: HOSPADM

## 2021-01-01 RX ORDER — SODIUM CHLORIDE 9 MG/ML
INJECTION, SOLUTION INTRAVENOUS CONTINUOUS
Status: DISCONTINUED | OUTPATIENT
Start: 2021-01-01 | End: 2021-01-01 | Stop reason: HOSPADM

## 2021-01-01 RX ORDER — ONDANSETRON 2 MG/ML
4 INJECTION INTRAMUSCULAR; INTRAVENOUS EVERY 6 HOURS PRN
Status: DISCONTINUED | OUTPATIENT
Start: 2021-01-01 | End: 2021-01-01 | Stop reason: HOSPADM

## 2021-01-01 RX ORDER — PROMETHAZINE HYDROCHLORIDE 25 MG/1
12.5 TABLET ORAL EVERY 6 HOURS PRN
Status: DISCONTINUED | OUTPATIENT
Start: 2021-01-01 | End: 2021-01-01 | Stop reason: SDUPTHER

## 2021-01-01 RX ORDER — FUROSEMIDE 10 MG/ML
40 INJECTION INTRAMUSCULAR; INTRAVENOUS 2 TIMES DAILY
Status: COMPLETED | OUTPATIENT
Start: 2021-01-01 | End: 2021-01-01

## 2021-01-01 RX ORDER — DIGOXIN 125 MCG
125 TABLET ORAL DAILY
Status: DISCONTINUED | OUTPATIENT
Start: 2021-01-01 | End: 2021-01-01 | Stop reason: HOSPADM

## 2021-01-01 RX ORDER — ASPIRIN 81 MG/1
81 TABLET ORAL DAILY
Status: DISCONTINUED | OUTPATIENT
Start: 2021-01-01 | End: 2021-01-01

## 2021-01-01 RX ORDER — SODIUM CHLORIDE 9 MG/ML
INJECTION, SOLUTION INTRAVENOUS PRN
Status: DISCONTINUED | OUTPATIENT
Start: 2021-01-01 | End: 2021-01-01 | Stop reason: HOSPADM

## 2021-01-01 RX ORDER — DEXTROSE MONOHYDRATE 25 G/50ML
12.5 INJECTION, SOLUTION INTRAVENOUS PRN
Status: DISCONTINUED | OUTPATIENT
Start: 2021-01-01 | End: 2021-01-01 | Stop reason: HOSPADM

## 2021-01-01 RX ORDER — HYDROCODONE BITARTRATE AND ACETAMINOPHEN 5; 325 MG/1; MG/1
TABLET ORAL
Status: ON HOLD | COMMUNITY
Start: 2021-01-01 | End: 2021-01-01 | Stop reason: HOSPADM

## 2021-01-01 RX ORDER — HYDROCODONE BITARTRATE AND ACETAMINOPHEN 5; 325 MG/1; MG/1
1 TABLET ORAL EVERY 6 HOURS PRN
Status: DISCONTINUED | OUTPATIENT
Start: 2021-01-01 | End: 2021-01-01 | Stop reason: SDUPTHER

## 2021-01-01 RX ORDER — DILTIAZEM HYDROCHLORIDE 60 MG/1
60 CAPSULE, EXTENDED RELEASE ORAL 2 TIMES DAILY
Qty: 60 CAPSULE | Refills: 3 | Status: SHIPPED | OUTPATIENT
Start: 2021-01-01 | End: 2021-01-01 | Stop reason: SDUPTHER

## 2021-01-01 RX ORDER — SODIUM CHLORIDE 0.9 % (FLUSH) 0.9 %
10 SYRINGE (ML) INJECTION PRN
Status: DISCONTINUED | OUTPATIENT
Start: 2021-01-01 | End: 2021-01-01 | Stop reason: SDUPTHER

## 2021-01-01 RX ORDER — MAGNESIUM SULFATE 1 G/100ML
1000 INJECTION INTRAVENOUS ONCE
Status: COMPLETED | OUTPATIENT
Start: 2021-01-01 | End: 2021-01-01

## 2021-01-01 RX ORDER — DIPHENOXYLATE HYDROCHLORIDE AND ATROPINE SULFATE 2.5; .025 MG/1; MG/1
1 TABLET ORAL 4 TIMES DAILY
Status: DISCONTINUED | OUTPATIENT
Start: 2021-01-01 | End: 2021-01-01 | Stop reason: HOSPADM

## 2021-01-01 RX ORDER — POLYETHYLENE GLYCOL 3350 17 G/17G
17 POWDER, FOR SOLUTION ORAL DAILY PRN
Status: DISCONTINUED | OUTPATIENT
Start: 2021-01-01 | End: 2021-01-01 | Stop reason: HOSPADM

## 2021-01-01 RX ORDER — MORPHINE SULFATE 2 MG/ML
2 INJECTION, SOLUTION INTRAMUSCULAR; INTRAVENOUS
Status: DISCONTINUED | OUTPATIENT
Start: 2021-01-01 | End: 2021-01-01 | Stop reason: HOSPADM

## 2021-01-01 RX ORDER — INSULIN LISPRO 100 [IU]/ML
0-3 INJECTION, SOLUTION INTRAVENOUS; SUBCUTANEOUS NIGHTLY
Status: DISCONTINUED | OUTPATIENT
Start: 2021-01-01 | End: 2021-01-01

## 2021-01-01 RX ORDER — ACETAMINOPHEN 325 MG/1
650 TABLET ORAL SEE ADMIN INSTRUCTIONS
Status: COMPLETED | OUTPATIENT
Start: 2021-01-01 | End: 2021-01-01

## 2021-01-01 RX ORDER — DIGOXIN 125 MCG
125 TABLET ORAL DAILY
Qty: 30 TABLET | Refills: 3 | Status: SHIPPED | OUTPATIENT
Start: 2021-01-01 | End: 2021-01-01 | Stop reason: ALTCHOICE

## 2021-01-01 RX ORDER — MAGNESIUM SULFATE IN WATER 40 MG/ML
2000 INJECTION, SOLUTION INTRAVENOUS ONCE
Status: COMPLETED | OUTPATIENT
Start: 2021-01-01 | End: 2021-01-01

## 2021-01-01 RX ORDER — MORPHINE SULFATE 2 MG/ML
2 INJECTION, SOLUTION INTRAMUSCULAR; INTRAVENOUS
Status: COMPLETED | OUTPATIENT
Start: 2021-01-01 | End: 2021-01-01

## 2021-01-01 RX ORDER — POTASSIUM CHLORIDE 7.45 MG/ML
10 INJECTION INTRAVENOUS PRN
Status: DISCONTINUED | OUTPATIENT
Start: 2021-01-01 | End: 2021-01-01 | Stop reason: HOSPADM

## 2021-01-01 RX ORDER — MAGNESIUM SULFATE IN WATER 40 MG/ML
2000 INJECTION, SOLUTION INTRAVENOUS PRN
Status: DISCONTINUED | OUTPATIENT
Start: 2021-01-01 | End: 2021-01-01 | Stop reason: HOSPADM

## 2021-01-01 RX ORDER — DIPHENHYDRAMINE HCL 25 MG
25 TABLET ORAL SEE ADMIN INSTRUCTIONS
Status: COMPLETED | OUTPATIENT
Start: 2021-01-01 | End: 2021-01-01

## 2021-01-01 RX ORDER — MORPHINE SULFATE 15 MG/1
15 TABLET, FILM COATED, EXTENDED RELEASE ORAL EVERY 8 HOURS SCHEDULED
Status: DISCONTINUED | OUTPATIENT
Start: 2021-01-01 | End: 2021-01-01 | Stop reason: HOSPADM

## 2021-01-01 RX ORDER — ALLOPURINOL 300 MG/1
300 TABLET ORAL DAILY
Status: DISCONTINUED | OUTPATIENT
Start: 2021-01-01 | End: 2021-01-01

## 2021-01-01 RX ORDER — TAMSULOSIN HYDROCHLORIDE 0.4 MG/1
0.4 CAPSULE ORAL 2 TIMES DAILY
Status: DISCONTINUED | OUTPATIENT
Start: 2021-01-01 | End: 2021-01-01 | Stop reason: HOSPADM

## 2021-01-01 RX ORDER — ACETAMINOPHEN 650 MG/1
650 SUPPOSITORY RECTAL EVERY 6 HOURS PRN
Status: DISCONTINUED | OUTPATIENT
Start: 2021-01-01 | End: 2021-01-01 | Stop reason: SDUPTHER

## 2021-01-01 RX ORDER — ACETAMINOPHEN 325 MG/1
650 TABLET ORAL EVERY 6 HOURS PRN
Status: DISCONTINUED | OUTPATIENT
Start: 2021-01-01 | End: 2021-01-01 | Stop reason: HOSPADM

## 2021-01-01 RX ORDER — 0.9 % SODIUM CHLORIDE 0.9 %
1000 INTRAVENOUS SOLUTION INTRAVENOUS ONCE
Status: COMPLETED | OUTPATIENT
Start: 2021-01-01 | End: 2021-01-01

## 2021-01-01 RX ORDER — NICOTINE POLACRILEX 4 MG
15 LOZENGE BUCCAL PRN
Status: DISCONTINUED | OUTPATIENT
Start: 2021-01-01 | End: 2021-01-01 | Stop reason: HOSPADM

## 2021-01-01 RX ORDER — LORAZEPAM 2 MG/ML
2 INJECTION INTRAMUSCULAR
Status: DISCONTINUED | OUTPATIENT
Start: 2021-01-01 | End: 2021-01-01 | Stop reason: HOSPADM

## 2021-01-01 RX ORDER — DIGOXIN 125 MCG
125 TABLET ORAL DAILY
Qty: 90 TABLET | Refills: 1 | Status: SHIPPED | OUTPATIENT
Start: 2021-01-01

## 2021-01-01 RX ORDER — SODIUM CHLORIDE 0.9 % (FLUSH) 0.9 %
5-40 SYRINGE (ML) INJECTION ONCE
Status: COMPLETED | OUTPATIENT
Start: 2021-01-01 | End: 2021-01-01

## 2021-01-01 RX ORDER — SODIUM CHLORIDE 0.9 % (FLUSH) 0.9 %
10 SYRINGE (ML) INJECTION EVERY 12 HOURS SCHEDULED
Status: DISCONTINUED | OUTPATIENT
Start: 2021-01-01 | End: 2021-01-01 | Stop reason: HOSPADM

## 2021-01-01 RX ORDER — INSULIN LISPRO 100 [IU]/ML
0-6 INJECTION, SOLUTION INTRAVENOUS; SUBCUTANEOUS NIGHTLY
Status: DISCONTINUED | OUTPATIENT
Start: 2021-01-01 | End: 2021-01-01

## 2021-01-01 RX ORDER — SODIUM CHLORIDE 0.9 % (FLUSH) 0.9 %
5-40 SYRINGE (ML) INJECTION EVERY 12 HOURS SCHEDULED
Status: DISCONTINUED | OUTPATIENT
Start: 2021-01-01 | End: 2021-01-01 | Stop reason: HOSPADM

## 2021-01-01 RX ORDER — FLUCONAZOLE 200 MG/1
400 TABLET ORAL DAILY
Qty: 14 TABLET | Refills: 0 | Status: SHIPPED | OUTPATIENT
Start: 2021-01-01 | End: 2021-01-01

## 2021-01-01 RX ORDER — DEXTROSE MONOHYDRATE 50 MG/ML
100 INJECTION, SOLUTION INTRAVENOUS PRN
Status: DISCONTINUED | OUTPATIENT
Start: 2021-01-01 | End: 2021-01-01 | Stop reason: HOSPADM

## 2021-01-01 RX ORDER — SODIUM CHLORIDE 0.9 % (FLUSH) 0.9 %
10 SYRINGE (ML) INJECTION EVERY 12 HOURS SCHEDULED
Status: DISCONTINUED | OUTPATIENT
Start: 2021-01-01 | End: 2021-01-01 | Stop reason: SDUPTHER

## 2021-01-01 RX ORDER — POTASSIUM CHLORIDE 20 MEQ/1
40 TABLET, EXTENDED RELEASE ORAL 2 TIMES DAILY WITH MEALS
Status: DISCONTINUED | OUTPATIENT
Start: 2021-01-01 | End: 2021-01-01 | Stop reason: HOSPADM

## 2021-01-01 RX ORDER — DILTIAZEM HYDROCHLORIDE 60 MG/1
60 CAPSULE, EXTENDED RELEASE ORAL 2 TIMES DAILY
Status: DISCONTINUED | OUTPATIENT
Start: 2021-01-01 | End: 2021-01-01 | Stop reason: HOSPADM

## 2021-01-01 RX ORDER — MORPHINE SULFATE 4 MG/ML
4 INJECTION, SOLUTION INTRAMUSCULAR; INTRAVENOUS ONCE
Status: COMPLETED | OUTPATIENT
Start: 2021-01-01 | End: 2021-01-01

## 2021-01-01 RX ORDER — FLUCONAZOLE 100 MG/1
400 TABLET ORAL DAILY
Status: DISCONTINUED | OUTPATIENT
Start: 2021-01-01 | End: 2021-01-01 | Stop reason: HOSPADM

## 2021-01-01 RX ORDER — INSULIN LISPRO 100 [IU]/ML
0-3 INJECTION, SOLUTION INTRAVENOUS; SUBCUTANEOUS NIGHTLY
Status: DISCONTINUED | OUTPATIENT
Start: 2021-01-01 | End: 2021-01-01 | Stop reason: HOSPADM

## 2021-01-01 RX ORDER — DEXTROSE, SODIUM CHLORIDE, AND POTASSIUM CHLORIDE 5; .45; .3 G/100ML; G/100ML; G/100ML
INJECTION INTRAVENOUS CONTINUOUS
Status: DISCONTINUED | OUTPATIENT
Start: 2021-01-01 | End: 2021-01-01

## 2021-01-01 RX ORDER — ACETAMINOPHEN 650 MG/1
650 SUPPOSITORY RECTAL EVERY 6 HOURS PRN
Status: DISCONTINUED | OUTPATIENT
Start: 2021-01-01 | End: 2021-01-01 | Stop reason: HOSPADM

## 2021-01-01 RX ORDER — ALLOPURINOL 300 MG/1
1 TABLET ORAL DAILY
Status: DISCONTINUED | OUTPATIENT
Start: 2021-01-01 | End: 2021-01-01 | Stop reason: ALTCHOICE

## 2021-01-01 RX ORDER — SODIUM CHLORIDE 9 MG/ML
1000 INJECTION, SOLUTION INTRAVENOUS ONCE
Status: COMPLETED | OUTPATIENT
Start: 2021-01-01 | End: 2021-01-01

## 2021-01-01 RX ORDER — ONDANSETRON 2 MG/ML
4 INJECTION INTRAMUSCULAR; INTRAVENOUS EVERY 6 HOURS PRN
Status: DISCONTINUED | OUTPATIENT
Start: 2021-01-01 | End: 2021-01-01 | Stop reason: SDUPTHER

## 2021-01-01 RX ORDER — MORPHINE SULFATE 4 MG/ML
4 INJECTION, SOLUTION INTRAMUSCULAR; INTRAVENOUS
Status: DISCONTINUED | OUTPATIENT
Start: 2021-01-01 | End: 2021-01-01 | Stop reason: HOSPADM

## 2021-01-01 RX ORDER — CYCLOBENZAPRINE HCL 10 MG
5 TABLET ORAL 3 TIMES DAILY
Status: DISCONTINUED | OUTPATIENT
Start: 2021-01-01 | End: 2021-01-01 | Stop reason: HOSPADM

## 2021-01-01 RX ORDER — DILTIAZEM HYDROCHLORIDE 60 MG/1
60 CAPSULE, EXTENDED RELEASE ORAL 2 TIMES DAILY
Qty: 180 CAPSULE | Refills: 1 | Status: SHIPPED | OUTPATIENT
Start: 2021-01-01

## 2021-01-01 RX ORDER — SODIUM CHLORIDE 0.9 % (FLUSH) 0.9 %
10 SYRINGE (ML) INJECTION PRN
Status: DISCONTINUED | OUTPATIENT
Start: 2021-01-01 | End: 2021-01-01 | Stop reason: HOSPADM

## 2021-01-01 RX ORDER — ALBUMIN (HUMAN) 12.5 G/50ML
25 SOLUTION INTRAVENOUS EVERY 8 HOURS
Status: COMPLETED | OUTPATIENT
Start: 2021-01-01 | End: 2021-01-01

## 2021-01-01 RX ORDER — CETIRIZINE HYDROCHLORIDE 10 MG/1
10 TABLET ORAL DAILY
COMMUNITY

## 2021-01-01 RX ORDER — PROMETHAZINE HYDROCHLORIDE 25 MG/1
12.5 TABLET ORAL EVERY 6 HOURS PRN
Status: DISCONTINUED | OUTPATIENT
Start: 2021-01-01 | End: 2021-01-01 | Stop reason: HOSPADM

## 2021-01-01 RX ORDER — INSULIN LISPRO 100 [IU]/ML
0-6 INJECTION, SOLUTION INTRAVENOUS; SUBCUTANEOUS
Status: DISCONTINUED | OUTPATIENT
Start: 2021-01-01 | End: 2021-01-01

## 2021-01-01 RX ORDER — ASPIRIN 81 MG/1
81 TABLET ORAL DAILY
Status: DISCONTINUED | OUTPATIENT
Start: 2021-01-01 | End: 2021-01-01 | Stop reason: HOSPADM

## 2021-01-01 RX ORDER — DILTIAZEM HYDROCHLORIDE 60 MG/1
60 CAPSULE, EXTENDED RELEASE ORAL 2 TIMES DAILY
Status: DISCONTINUED | OUTPATIENT
Start: 2021-01-01 | End: 2021-01-01

## 2021-01-01 RX ORDER — HYDROCODONE BITARTRATE AND ACETAMINOPHEN 5; 325 MG/1; MG/1
1 TABLET ORAL EVERY 6 HOURS PRN
Status: DISCONTINUED | OUTPATIENT
Start: 2021-01-01 | End: 2021-01-01 | Stop reason: HOSPADM

## 2021-01-01 RX ORDER — CYCLOBENZAPRINE HCL 10 MG
5 TABLET ORAL 3 TIMES DAILY PRN
Status: DISCONTINUED | OUTPATIENT
Start: 2021-01-01 | End: 2021-01-01 | Stop reason: HOSPADM

## 2021-01-01 RX ORDER — POLYETHYLENE GLYCOL 3350 17 G/17G
17 POWDER, FOR SOLUTION ORAL DAILY PRN
Status: DISCONTINUED | OUTPATIENT
Start: 2021-01-01 | End: 2021-01-01 | Stop reason: SDUPTHER

## 2021-01-01 RX ORDER — POTASSIUM CHLORIDE 20 MEQ/1
40 TABLET, EXTENDED RELEASE ORAL PRN
Status: DISCONTINUED | OUTPATIENT
Start: 2021-01-01 | End: 2021-01-01 | Stop reason: HOSPADM

## 2021-01-01 RX ORDER — MORPHINE SULFATE 15 MG/1
TABLET, FILM COATED, EXTENDED RELEASE ORAL
COMMUNITY
Start: 2021-01-01

## 2021-01-01 RX ORDER — ACETAMINOPHEN 325 MG/1
650 TABLET ORAL ONCE
Status: COMPLETED | OUTPATIENT
Start: 2021-01-01 | End: 2021-01-01

## 2021-01-01 RX ORDER — MORPHINE SULFATE 15 MG/1
15 TABLET, FILM COATED, EXTENDED RELEASE ORAL EVERY 8 HOURS
Status: DISCONTINUED | OUTPATIENT
Start: 2021-01-01 | End: 2021-01-01 | Stop reason: HOSPADM

## 2021-01-01 RX ORDER — SODIUM CHLORIDE 9 MG/ML
INJECTION, SOLUTION INTRAVENOUS PRN
Status: COMPLETED | OUTPATIENT
Start: 2021-01-01 | End: 2021-01-01

## 2021-01-01 RX ORDER — SODIUM CHLORIDE 9 MG/ML
INJECTION, SOLUTION INTRAVENOUS
Status: DISPENSED
Start: 2021-01-01 | End: 2021-01-01

## 2021-01-01 RX ORDER — SODIUM CHLORIDE 9 MG/ML
25 INJECTION, SOLUTION INTRAVENOUS PRN
Status: DISCONTINUED | OUTPATIENT
Start: 2021-01-01 | End: 2021-01-01 | Stop reason: HOSPADM

## 2021-01-01 RX ORDER — DIPHENOXYLATE HYDROCHLORIDE AND ATROPINE SULFATE 2.5; .025 MG/1; MG/1
1 TABLET ORAL 2 TIMES DAILY PRN
Qty: 40 TABLET | Refills: 0 | Status: SHIPPED | OUTPATIENT
Start: 2021-01-01 | End: 2021-01-01

## 2021-01-01 RX ORDER — MORPHINE SULFATE 15 MG/1
15 TABLET, FILM COATED, EXTENDED RELEASE ORAL EVERY 12 HOURS SCHEDULED
Status: DISCONTINUED | OUTPATIENT
Start: 2021-01-01 | End: 2021-01-01

## 2021-01-01 RX ORDER — ACETAMINOPHEN 325 MG/1
650 TABLET ORAL EVERY 6 HOURS PRN
Status: DISCONTINUED | OUTPATIENT
Start: 2021-01-01 | End: 2021-01-01 | Stop reason: SDUPTHER

## 2021-01-01 RX ORDER — INSULIN LISPRO 100 [IU]/ML
0-6 INJECTION, SOLUTION INTRAVENOUS; SUBCUTANEOUS
Status: DISCONTINUED | OUTPATIENT
Start: 2021-01-01 | End: 2021-01-01 | Stop reason: HOSPADM

## 2021-01-01 RX ORDER — LORAZEPAM 2 MG/ML
2 CONCENTRATE ORAL EVERY 4 HOURS PRN
Qty: 1 BOTTLE | Refills: 0 | Status: SHIPPED | OUTPATIENT
Start: 2021-01-01 | End: 2021-07-24

## 2021-01-01 RX ORDER — HYDROCODONE BITARTRATE AND ACETAMINOPHEN 5; 325 MG/1; MG/1
1 TABLET ORAL EVERY 6 HOURS PRN
Qty: 12 TABLET | Refills: 0 | Status: SHIPPED | OUTPATIENT
Start: 2021-01-01 | End: 2021-01-01

## 2021-01-01 RX ORDER — ALBUMIN (HUMAN) 12.5 G/50ML
25 SOLUTION INTRAVENOUS EVERY 12 HOURS
Status: DISCONTINUED | OUTPATIENT
Start: 2021-01-01 | End: 2021-01-01 | Stop reason: HOSPADM

## 2021-01-01 RX ORDER — SODIUM CHLORIDE 9 MG/ML
INJECTION, SOLUTION INTRAVENOUS PRN
Status: ACTIVE | OUTPATIENT
Start: 2021-01-01 | End: 2021-01-01

## 2021-01-01 RX ORDER — MORPHINE SULFATE 100 MG/5ML
10 SOLUTION ORAL
Qty: 1 BOTTLE | Refills: 0 | Status: SHIPPED | OUTPATIENT
Start: 2021-01-01 | End: 2021-01-01

## 2021-01-01 RX ORDER — SODIUM CHLORIDE 0.9 % (FLUSH) 0.9 %
5-40 SYRINGE (ML) INJECTION PRN
Status: DISCONTINUED | OUTPATIENT
Start: 2021-01-01 | End: 2021-01-01 | Stop reason: HOSPADM

## 2021-01-01 RX ORDER — PANTOPRAZOLE SODIUM 40 MG/1
40 TABLET, DELAYED RELEASE ORAL
Status: DISCONTINUED | OUTPATIENT
Start: 2021-01-01 | End: 2021-01-01 | Stop reason: HOSPADM

## 2021-01-01 RX ADMIN — HYDROCODONE BITARTRATE AND ACETAMINOPHEN 1 TABLET: 5; 325 TABLET ORAL at 09:10

## 2021-01-01 RX ADMIN — ALBUMIN (HUMAN) 25 G: 0.25 INJECTION, SOLUTION INTRAVENOUS at 17:29

## 2021-01-01 RX ADMIN — POTASSIUM CHLORIDE 40 MEQ: 1500 TABLET, EXTENDED RELEASE ORAL at 16:39

## 2021-01-01 RX ADMIN — DIPHENOXYLATE HYDROCHLORIDE AND ATROPINE SULFATE 1 TABLET: 2.5; .025 TABLET ORAL at 11:35

## 2021-01-01 RX ADMIN — DIPHENOXYLATE HYDROCHLORIDE AND ATROPINE SULFATE 1 TABLET: 2.5; .025 TABLET ORAL at 08:26

## 2021-01-01 RX ADMIN — Medication 10 ML: at 21:44

## 2021-01-01 RX ADMIN — CEFEPIME HYDROCHLORIDE 2000 MG: 2 INJECTION, POWDER, FOR SOLUTION INTRAVENOUS at 03:17

## 2021-01-01 RX ADMIN — MORPHINE SULFATE 15 MG: 15 TABLET, FILM COATED, EXTENDED RELEASE ORAL at 12:15

## 2021-01-01 RX ADMIN — DILTIAZEM HYDROCHLORIDE 60 MG: 60 CAPSULE, EXTENDED RELEASE ORAL at 23:22

## 2021-01-01 RX ADMIN — CEFEPIME HYDROCHLORIDE 2000 MG: 2 INJECTION, POWDER, FOR SOLUTION INTRAVENOUS at 13:06

## 2021-01-01 RX ADMIN — Medication 10 ML: at 08:54

## 2021-01-01 RX ADMIN — DIGOXIN 125 MCG: 125 TABLET ORAL at 13:14

## 2021-01-01 RX ADMIN — TAMSULOSIN HYDROCHLORIDE 0.4 MG: 0.4 CAPSULE ORAL at 23:04

## 2021-01-01 RX ADMIN — MORPHINE SULFATE 15 MG: 15 TABLET, FILM COATED, EXTENDED RELEASE ORAL at 13:09

## 2021-01-01 RX ADMIN — CYCLOBENZAPRINE 5 MG: 10 TABLET, FILM COATED ORAL at 12:55

## 2021-01-01 RX ADMIN — CYCLOBENZAPRINE 5 MG: 10 TABLET, FILM COATED ORAL at 15:48

## 2021-01-01 RX ADMIN — DILTIAZEM HYDROCHLORIDE 60 MG: 60 CAPSULE, EXTENDED RELEASE ORAL at 09:11

## 2021-01-01 RX ADMIN — INSULIN GLARGINE 16 UNITS: 100 INJECTION, SOLUTION SUBCUTANEOUS at 16:24

## 2021-01-01 RX ADMIN — CEFEPIME HYDROCHLORIDE 2000 MG: 2 INJECTION, POWDER, FOR SOLUTION INTRAVENOUS at 21:23

## 2021-01-01 RX ADMIN — DEXTROSE MONOHYDRATE 12.5 G: 25 INJECTION, SOLUTION INTRAVENOUS at 03:03

## 2021-01-01 RX ADMIN — DILTIAZEM HYDROCHLORIDE 60 MG: 60 CAPSULE, EXTENDED RELEASE ORAL at 08:37

## 2021-01-01 RX ADMIN — POTASSIUM BICARBONATE 40 MEQ: 782 TABLET, EFFERVESCENT ORAL at 08:37

## 2021-01-01 RX ADMIN — FUROSEMIDE 40 MG: 10 INJECTION, SOLUTION INTRAMUSCULAR; INTRAVENOUS at 13:03

## 2021-01-01 RX ADMIN — Medication 10 ML: at 09:12

## 2021-01-01 RX ADMIN — DILTIAZEM HYDROCHLORIDE 60 MG: 60 CAPSULE, EXTENDED RELEASE ORAL at 13:03

## 2021-01-01 RX ADMIN — TAMSULOSIN HYDROCHLORIDE 0.4 MG: 0.4 CAPSULE ORAL at 13:14

## 2021-01-01 RX ADMIN — INSULIN GLARGINE 15 UNITS: 100 INJECTION, SOLUTION SUBCUTANEOUS at 20:08

## 2021-01-01 RX ADMIN — Medication 10 MEQ: at 04:13

## 2021-01-01 RX ADMIN — POTASSIUM CHLORIDE, DEXTROSE MONOHYDRATE AND SODIUM CHLORIDE: 300; 5; 450 INJECTION, SOLUTION INTRAVENOUS at 12:33

## 2021-01-01 RX ADMIN — MORPHINE SULFATE 15 MG: 15 TABLET, FILM COATED, EXTENDED RELEASE ORAL at 06:11

## 2021-01-01 RX ADMIN — INSULIN LISPRO 1 UNITS: 100 INJECTION, SOLUTION INTRAVENOUS; SUBCUTANEOUS at 21:23

## 2021-01-01 RX ADMIN — MORPHINE SULFATE 2 MG: 2 INJECTION, SOLUTION INTRAMUSCULAR; INTRAVENOUS at 01:11

## 2021-01-01 RX ADMIN — LIDOCAINE HYDROCHLORIDE 5 ML: 20 SOLUTION ORAL; TOPICAL at 22:18

## 2021-01-01 RX ADMIN — MORPHINE SULFATE 15 MG: 15 TABLET, FILM COATED, EXTENDED RELEASE ORAL at 13:41

## 2021-01-01 RX ADMIN — Medication 10 ML: at 22:11

## 2021-01-01 RX ADMIN — LIDOCAINE HYDROCHLORIDE 5 ML: 20 SOLUTION ORAL; TOPICAL at 13:00

## 2021-01-01 RX ADMIN — SODIUM CHLORIDE 1000 ML: 9 INJECTION, SOLUTION INTRAVENOUS at 13:07

## 2021-01-01 RX ADMIN — TAMSULOSIN HYDROCHLORIDE 0.4 MG: 0.4 CAPSULE ORAL at 21:02

## 2021-01-01 RX ADMIN — CETIRIZINE HYDROCHLORIDE 10 MG: 10 TABLET, FILM COATED ORAL at 20:05

## 2021-01-01 RX ADMIN — INSULIN LISPRO 3 UNITS: 100 INJECTION, SOLUTION INTRAVENOUS; SUBCUTANEOUS at 12:15

## 2021-01-01 RX ADMIN — MAGNESIUM SULFATE HEPTAHYDRATE 1000 MG: 1 INJECTION, SOLUTION INTRAVENOUS at 09:34

## 2021-01-01 RX ADMIN — INSULIN LISPRO 4 UNITS: 100 INJECTION, SOLUTION INTRAVENOUS; SUBCUTANEOUS at 12:39

## 2021-01-01 RX ADMIN — Medication 15 G: at 08:20

## 2021-01-01 RX ADMIN — POTASSIUM CHLORIDE 40 MEQ: 1500 TABLET, EXTENDED RELEASE ORAL at 18:35

## 2021-01-01 RX ADMIN — INSULIN LISPRO 1 UNITS: 100 INJECTION, SOLUTION INTRAVENOUS; SUBCUTANEOUS at 10:24

## 2021-01-01 RX ADMIN — MORPHINE SULFATE 15 MG: 15 TABLET, FILM COATED, EXTENDED RELEASE ORAL at 14:29

## 2021-01-01 RX ADMIN — Medication 1 PACKET: at 11:13

## 2021-01-01 RX ADMIN — Medication 10 ML: at 22:18

## 2021-01-01 RX ADMIN — Medication 10 ML: at 22:25

## 2021-01-01 RX ADMIN — POTASSIUM CHLORIDE 40 MEQ: 1500 TABLET, EXTENDED RELEASE ORAL at 12:39

## 2021-01-01 RX ADMIN — Medication 10 MEQ: at 22:48

## 2021-01-01 RX ADMIN — LIDOCAINE HYDROCHLORIDE 5 ML: 20 SOLUTION ORAL; TOPICAL at 17:55

## 2021-01-01 RX ADMIN — POTASSIUM CHLORIDE 40 MEQ: 1500 TABLET, EXTENDED RELEASE ORAL at 08:25

## 2021-01-01 RX ADMIN — POTASSIUM CHLORIDE 10 MEQ: 7.46 INJECTION, SOLUTION INTRAVENOUS at 07:11

## 2021-01-01 RX ADMIN — MORPHINE SULFATE 15 MG: 15 TABLET, FILM COATED, EXTENDED RELEASE ORAL at 14:16

## 2021-01-01 RX ADMIN — CEFEPIME HYDROCHLORIDE 2000 MG: 2 INJECTION, POWDER, FOR SOLUTION INTRAVENOUS at 12:15

## 2021-01-01 RX ADMIN — Medication 10 ML: at 08:41

## 2021-01-01 RX ADMIN — POTASSIUM CHLORIDE 40 MEQ: 1500 TABLET, EXTENDED RELEASE ORAL at 09:11

## 2021-01-01 RX ADMIN — Medication 10 MEQ: at 14:08

## 2021-01-01 RX ADMIN — TAMSULOSIN HYDROCHLORIDE 0.4 MG: 0.4 CAPSULE ORAL at 12:15

## 2021-01-01 RX ADMIN — BENZOCAINE AND MENTHOL 1 LOZENGE: 15; 3.6 LOZENGE ORAL at 02:42

## 2021-01-01 RX ADMIN — POTASSIUM CHLORIDE 10 MEQ: 7.46 INJECTION, SOLUTION INTRAVENOUS at 05:32

## 2021-01-01 RX ADMIN — INSULIN LISPRO 2 UNITS: 100 INJECTION, SOLUTION INTRAVENOUS; SUBCUTANEOUS at 16:40

## 2021-01-01 RX ADMIN — POTASSIUM CHLORIDE 40 MEQ: 1500 TABLET, EXTENDED RELEASE ORAL at 16:35

## 2021-01-01 RX ADMIN — INSULIN LISPRO 1 UNITS: 100 INJECTION, SOLUTION INTRAVENOUS; SUBCUTANEOUS at 12:49

## 2021-01-01 RX ADMIN — DILTIAZEM HYDROCHLORIDE 60 MG: 60 CAPSULE, EXTENDED RELEASE ORAL at 08:21

## 2021-01-01 RX ADMIN — ACETAMINOPHEN 650 MG: 325 TABLET ORAL at 21:42

## 2021-01-01 RX ADMIN — CETIRIZINE HYDROCHLORIDE 10 MG: 10 TABLET, FILM COATED ORAL at 22:17

## 2021-01-01 RX ADMIN — SODIUM CHLORIDE: 9 INJECTION, SOLUTION INTRAVENOUS at 03:08

## 2021-01-01 RX ADMIN — DIPHENOXYLATE HYDROCHLORIDE AND ATROPINE SULFATE 1 TABLET: 2.5; .025 TABLET ORAL at 12:32

## 2021-01-01 RX ADMIN — FLUCONAZOLE 400 MG: 100 TABLET ORAL at 21:23

## 2021-01-01 RX ADMIN — DIGOXIN 125 MCG: 125 TABLET ORAL at 10:03

## 2021-01-01 RX ADMIN — Medication 10 ML: at 09:11

## 2021-01-01 RX ADMIN — DIGOXIN 125 MCG: 125 TABLET ORAL at 08:26

## 2021-01-01 RX ADMIN — ACETAMINOPHEN 650 MG: 325 TABLET ORAL at 06:01

## 2021-01-01 RX ADMIN — Medication 10 ML: at 08:49

## 2021-01-01 RX ADMIN — Medication 10 MEQ: at 14:43

## 2021-01-01 RX ADMIN — CYCLOBENZAPRINE 5 MG: 10 TABLET, FILM COATED ORAL at 10:24

## 2021-01-01 RX ADMIN — MORPHINE SULFATE 15 MG: 15 TABLET, FILM COATED, EXTENDED RELEASE ORAL at 06:15

## 2021-01-01 RX ADMIN — TAMSULOSIN HYDROCHLORIDE 0.4 MG: 0.4 CAPSULE ORAL at 10:02

## 2021-01-01 RX ADMIN — INSULIN LISPRO 4 UNITS: 100 INJECTION, SOLUTION INTRAVENOUS; SUBCUTANEOUS at 11:35

## 2021-01-01 RX ADMIN — DILTIAZEM HYDROCHLORIDE 60 MG: 60 CAPSULE, EXTENDED RELEASE ORAL at 10:03

## 2021-01-01 RX ADMIN — TAMSULOSIN HYDROCHLORIDE 0.4 MG: 0.4 CAPSULE ORAL at 21:21

## 2021-01-01 RX ADMIN — POTASSIUM CHLORIDE 10 MEQ: 7.46 INJECTION, SOLUTION INTRAVENOUS at 03:24

## 2021-01-01 RX ADMIN — DIGOXIN 125 MCG: 125 TABLET ORAL at 08:42

## 2021-01-01 RX ADMIN — LIDOCAINE HYDROCHLORIDE 5 ML: 20 SOLUTION ORAL; TOPICAL at 13:10

## 2021-01-01 RX ADMIN — ALBUMIN (HUMAN) 25 G: 0.25 INJECTION, SOLUTION INTRAVENOUS at 06:24

## 2021-01-01 RX ADMIN — MORPHINE SULFATE 2 MG: 2 INJECTION, SOLUTION INTRAMUSCULAR; INTRAVENOUS at 11:33

## 2021-01-01 RX ADMIN — TAMSULOSIN HYDROCHLORIDE 0.4 MG: 0.4 CAPSULE ORAL at 09:23

## 2021-01-01 RX ADMIN — DIPHENHYDRAMINE HYDROCHLORIDE 25 MG: 25 TABLET ORAL at 09:45

## 2021-01-01 RX ADMIN — DIPHENOXYLATE HYDROCHLORIDE AND ATROPINE SULFATE 1 TABLET: 2.5; .025 TABLET ORAL at 08:37

## 2021-01-01 RX ADMIN — DIGOXIN 125 MCG: 125 TABLET ORAL at 13:04

## 2021-01-01 RX ADMIN — MORPHINE SULFATE 2 MG: 2 INJECTION, SOLUTION INTRAMUSCULAR; INTRAVENOUS at 06:16

## 2021-01-01 RX ADMIN — DIPHENOXYLATE HYDROCHLORIDE AND ATROPINE SULFATE 1 TABLET: 2.5; .025 TABLET ORAL at 16:39

## 2021-01-01 RX ADMIN — ENOXAPARIN SODIUM 40 MG: 40 INJECTION SUBCUTANEOUS at 08:37

## 2021-01-01 RX ADMIN — DIPHENOXYLATE HYDROCHLORIDE AND ATROPINE SULFATE 1 TABLET: 2.5; .025 TABLET ORAL at 14:21

## 2021-01-01 RX ADMIN — TAMSULOSIN HYDROCHLORIDE 0.4 MG: 0.4 CAPSULE ORAL at 08:42

## 2021-01-01 RX ADMIN — CEFEPIME HYDROCHLORIDE 2000 MG: 2 INJECTION, POWDER, FOR SOLUTION INTRAVENOUS at 02:54

## 2021-01-01 RX ADMIN — MAGNESIUM SULFATE HEPTAHYDRATE 2000 MG: 40 INJECTION, SOLUTION INTRAVENOUS at 12:54

## 2021-01-01 RX ADMIN — CYCLOBENZAPRINE 5 MG: 10 TABLET, FILM COATED ORAL at 15:36

## 2021-01-01 RX ADMIN — PANTOPRAZOLE SODIUM 40 MG: 40 TABLET, DELAYED RELEASE ORAL at 05:31

## 2021-01-01 RX ADMIN — MORPHINE SULFATE 15 MG: 15 TABLET, FILM COATED, EXTENDED RELEASE ORAL at 22:09

## 2021-01-01 RX ADMIN — NYSTATIN 5 ML: 100000 SUSPENSION ORAL at 09:15

## 2021-01-01 RX ADMIN — INSULIN LISPRO 1 UNITS: 100 INJECTION, SOLUTION INTRAVENOUS; SUBCUTANEOUS at 18:15

## 2021-01-01 RX ADMIN — FLUCONAZOLE 400 MG: 100 TABLET ORAL at 08:42

## 2021-01-01 RX ADMIN — Medication 10 MEQ: at 15:44

## 2021-01-01 RX ADMIN — INSULIN LISPRO 4 UNITS: 100 INJECTION, SOLUTION INTRAVENOUS; SUBCUTANEOUS at 12:14

## 2021-01-01 RX ADMIN — TAMSULOSIN HYDROCHLORIDE 0.4 MG: 0.4 CAPSULE ORAL at 09:11

## 2021-01-01 RX ADMIN — Medication 81 MG: at 13:15

## 2021-01-01 RX ADMIN — CYCLOBENZAPRINE 5 MG: 10 TABLET, FILM COATED ORAL at 20:26

## 2021-01-01 RX ADMIN — MORPHINE SULFATE 2 MG: 2 INJECTION, SOLUTION INTRAMUSCULAR; INTRAVENOUS at 08:20

## 2021-01-01 RX ADMIN — CEFEPIME HYDROCHLORIDE 2000 MG: 2 INJECTION, POWDER, FOR SOLUTION INTRAVENOUS at 02:45

## 2021-01-01 RX ADMIN — TAMSULOSIN HYDROCHLORIDE 0.4 MG: 0.4 CAPSULE ORAL at 22:17

## 2021-01-01 RX ADMIN — CETIRIZINE HYDROCHLORIDE 10 MG: 10 TABLET, FILM COATED ORAL at 20:26

## 2021-01-01 RX ADMIN — CEFEPIME HYDROCHLORIDE 2000 MG: 2 INJECTION, POWDER, FOR SOLUTION INTRAVENOUS at 10:28

## 2021-01-01 RX ADMIN — MORPHINE SULFATE 15 MG: 15 TABLET, FILM COATED, EXTENDED RELEASE ORAL at 12:55

## 2021-01-01 RX ADMIN — IOHEXOL 50 ML: 240 INJECTION, SOLUTION INTRATHECAL; INTRAVASCULAR; INTRAVENOUS; ORAL at 11:14

## 2021-01-01 RX ADMIN — TAMSULOSIN HYDROCHLORIDE 0.4 MG: 0.4 CAPSULE ORAL at 20:05

## 2021-01-01 RX ADMIN — INSULIN GLARGINE 15 UNITS: 100 INJECTION, SOLUTION SUBCUTANEOUS at 09:17

## 2021-01-01 RX ADMIN — TAMSULOSIN HYDROCHLORIDE 0.4 MG: 0.4 CAPSULE ORAL at 20:14

## 2021-01-01 RX ADMIN — DIPHENOXYLATE HYDROCHLORIDE AND ATROPINE SULFATE 1 TABLET: 2.5; .025 TABLET ORAL at 12:14

## 2021-01-01 RX ADMIN — MORPHINE SULFATE 15 MG: 15 TABLET, FILM COATED, EXTENDED RELEASE ORAL at 01:11

## 2021-01-01 RX ADMIN — Medication 10 MEQ: at 03:05

## 2021-01-01 RX ADMIN — INSULIN GLARGINE 14 UNITS: 100 INJECTION, SOLUTION SUBCUTANEOUS at 11:16

## 2021-01-01 RX ADMIN — PANTOPRAZOLE SODIUM 40 MG: 40 TABLET, DELAYED RELEASE ORAL at 06:11

## 2021-01-01 RX ADMIN — ASPIRIN 81 MG: 81 TABLET, CHEWABLE ORAL at 08:36

## 2021-01-01 RX ADMIN — TAMSULOSIN HYDROCHLORIDE 0.4 MG: 0.4 CAPSULE ORAL at 19:58

## 2021-01-01 RX ADMIN — FLUCONAZOLE 200 MG: 2 INJECTION, SOLUTION INTRAVENOUS at 17:36

## 2021-01-01 RX ADMIN — SODIUM CHLORIDE: 9 INJECTION, SOLUTION INTRAVENOUS at 16:32

## 2021-01-01 RX ADMIN — ENOXAPARIN SODIUM 40 MG: 40 INJECTION SUBCUTANEOUS at 08:36

## 2021-01-01 RX ADMIN — HYDROMORPHONE HYDROCHLORIDE 0.5 MG: 1 INJECTION, SOLUTION INTRAMUSCULAR; INTRAVENOUS; SUBCUTANEOUS at 02:32

## 2021-01-01 RX ADMIN — SODIUM CHLORIDE: 9 INJECTION, SOLUTION INTRAVENOUS at 08:58

## 2021-01-01 RX ADMIN — INSULIN GLARGINE 15 UNITS: 100 INJECTION, SOLUTION SUBCUTANEOUS at 10:07

## 2021-01-01 RX ADMIN — DILTIAZEM HYDROCHLORIDE 60 MG: 60 CAPSULE, EXTENDED RELEASE ORAL at 11:29

## 2021-01-01 RX ADMIN — MORPHINE SULFATE 15 MG: 15 TABLET, FILM COATED, EXTENDED RELEASE ORAL at 05:08

## 2021-01-01 RX ADMIN — NYSTATIN 5 ML: 100000 SUSPENSION ORAL at 20:37

## 2021-01-01 RX ADMIN — POTASSIUM BICARBONATE 40 MEQ: 782 TABLET, EFFERVESCENT ORAL at 21:41

## 2021-01-01 RX ADMIN — ACETAMINOPHEN 650 MG: 325 TABLET ORAL at 21:21

## 2021-01-01 RX ADMIN — Medication 10 ML: at 02:42

## 2021-01-01 RX ADMIN — ENOXAPARIN SODIUM 40 MG: 40 INJECTION SUBCUTANEOUS at 10:06

## 2021-01-01 RX ADMIN — NYSTATIN 5 ML: 100000 SUSPENSION ORAL at 04:28

## 2021-01-01 RX ADMIN — DEXTROSE MONOHYDRATE 12.5 G: 25 INJECTION, SOLUTION INTRAVENOUS at 08:36

## 2021-01-01 RX ADMIN — Medication 10 ML: at 23:06

## 2021-01-01 RX ADMIN — MORPHINE SULFATE 4 MG: 4 INJECTION, SOLUTION INTRAMUSCULAR; INTRAVENOUS at 20:41

## 2021-01-01 RX ADMIN — IOPAMIDOL 75 ML: 755 INJECTION, SOLUTION INTRAVENOUS at 14:35

## 2021-01-01 RX ADMIN — DIPHENOXYLATE HYDROCHLORIDE AND ATROPINE SULFATE 1 TABLET: 2.5; .025 TABLET ORAL at 05:27

## 2021-01-01 RX ADMIN — TAMSULOSIN HYDROCHLORIDE 0.4 MG: 0.4 CAPSULE ORAL at 08:37

## 2021-01-01 RX ADMIN — CYCLOBENZAPRINE 5 MG: 10 TABLET, FILM COATED ORAL at 22:17

## 2021-01-01 RX ADMIN — POTASSIUM CHLORIDE 10 MEQ: 7.46 INJECTION, SOLUTION INTRAVENOUS at 04:29

## 2021-01-01 RX ADMIN — Medication 10 ML: at 20:17

## 2021-01-01 RX ADMIN — DIPHENOXYLATE HYDROCHLORIDE AND ATROPINE SULFATE 1 TABLET: 2.5; .025 TABLET ORAL at 21:21

## 2021-01-01 RX ADMIN — NYSTATIN 5 ML: 100000 SUSPENSION ORAL at 22:13

## 2021-01-01 RX ADMIN — MORPHINE SULFATE 15 MG: 15 TABLET, FILM COATED, EXTENDED RELEASE ORAL at 05:32

## 2021-01-01 RX ADMIN — DIPHENOXYLATE HYDROCHLORIDE AND ATROPINE SULFATE 1 TABLET: 2.5; .025 TABLET ORAL at 08:36

## 2021-01-01 RX ADMIN — NYSTATIN 5 ML: 100000 SUSPENSION ORAL at 03:40

## 2021-01-01 RX ADMIN — DEXTROSE MONOHYDRATE 12.5 G: 25 INJECTION, SOLUTION INTRAVENOUS at 17:50

## 2021-01-01 RX ADMIN — INSULIN LISPRO 4 UNITS: 100 INJECTION, SOLUTION INTRAVENOUS; SUBCUTANEOUS at 19:07

## 2021-01-01 RX ADMIN — DILTIAZEM HYDROCHLORIDE 60 MG: 60 CAPSULE, EXTENDED RELEASE ORAL at 08:26

## 2021-01-01 RX ADMIN — HYDROMORPHONE HYDROCHLORIDE 0.5 MG: 1 INJECTION, SOLUTION INTRAMUSCULAR; INTRAVENOUS; SUBCUTANEOUS at 20:30

## 2021-01-01 RX ADMIN — LIDOCAINE HYDROCHLORIDE 5 ML: 20 SOLUTION ORAL; TOPICAL at 20:05

## 2021-01-01 RX ADMIN — Medication 81 MG: at 12:15

## 2021-01-01 RX ADMIN — Medication 10 ML: at 20:29

## 2021-01-01 RX ADMIN — DILTIAZEM HYDROCHLORIDE 60 MG: 60 CAPSULE, EXTENDED RELEASE ORAL at 21:42

## 2021-01-01 RX ADMIN — Medication 10 ML: at 20:06

## 2021-01-01 RX ADMIN — INSULIN GLARGINE 15 UNITS: 100 INJECTION, SOLUTION SUBCUTANEOUS at 21:23

## 2021-01-01 RX ADMIN — ASPIRIN 81 MG: 81 TABLET, CHEWABLE ORAL at 08:26

## 2021-01-01 RX ADMIN — INSULIN GLARGINE 15 UNITS: 100 INJECTION, SOLUTION SUBCUTANEOUS at 22:19

## 2021-01-01 RX ADMIN — DIGOXIN 125 MCG: 125 TABLET ORAL at 08:20

## 2021-01-01 RX ADMIN — SODIUM CHLORIDE: 9 INJECTION, SOLUTION INTRAVENOUS at 22:48

## 2021-01-01 RX ADMIN — MORPHINE SULFATE 15 MG: 15 TABLET, FILM COATED, EXTENDED RELEASE ORAL at 02:53

## 2021-01-01 RX ADMIN — TAMSULOSIN HYDROCHLORIDE 0.4 MG: 0.4 CAPSULE ORAL at 10:27

## 2021-01-01 RX ADMIN — ALBUMIN (HUMAN) 25 G: 0.25 INJECTION, SOLUTION INTRAVENOUS at 04:30

## 2021-01-01 RX ADMIN — MORPHINE SULFATE 15 MG: 15 TABLET, FILM COATED, EXTENDED RELEASE ORAL at 20:26

## 2021-01-01 RX ADMIN — CEFEPIME HYDROCHLORIDE 2000 MG: 2 INJECTION, POWDER, FOR SOLUTION INTRAVENOUS at 17:54

## 2021-01-01 RX ADMIN — FLUCONAZOLE 400 MG: 100 TABLET ORAL at 13:13

## 2021-01-01 RX ADMIN — ACETAMINOPHEN 650 MG: 325 TABLET ORAL at 02:42

## 2021-01-01 RX ADMIN — TAMSULOSIN HYDROCHLORIDE 0.4 MG: 0.4 CAPSULE ORAL at 22:09

## 2021-01-01 RX ADMIN — FLUCONAZOLE 400 MG: 100 TABLET ORAL at 09:09

## 2021-01-01 RX ADMIN — LIDOCAINE HYDROCHLORIDE 5 ML: 20 SOLUTION ORAL; TOPICAL at 18:31

## 2021-01-01 RX ADMIN — CEFEPIME HYDROCHLORIDE 2000 MG: 2 INJECTION, POWDER, FOR SOLUTION INTRAVENOUS at 20:25

## 2021-01-01 RX ADMIN — Medication 10 MEQ: at 10:00

## 2021-01-01 RX ADMIN — DIGOXIN 125 MCG: 125 TABLET ORAL at 09:11

## 2021-01-01 RX ADMIN — MORPHINE SULFATE 15 MG: 15 TABLET, FILM COATED, EXTENDED RELEASE ORAL at 20:16

## 2021-01-01 RX ADMIN — MORPHINE SULFATE 15 MG: 15 TABLET, FILM COATED, EXTENDED RELEASE ORAL at 18:49

## 2021-01-01 RX ADMIN — Medication 10 ML: at 10:29

## 2021-01-01 RX ADMIN — Medication 10 ML: at 20:28

## 2021-01-01 RX ADMIN — CEFEPIME HYDROCHLORIDE 2000 MG: 2 INJECTION, POWDER, FOR SOLUTION INTRAVENOUS at 02:42

## 2021-01-01 RX ADMIN — TAMSULOSIN HYDROCHLORIDE 0.4 MG: 0.4 CAPSULE ORAL at 13:04

## 2021-01-01 RX ADMIN — Medication 81 MG: at 08:42

## 2021-01-01 RX ADMIN — NYSTATIN 5 ML: 100000 SUSPENSION ORAL at 08:21

## 2021-01-01 RX ADMIN — CYCLOBENZAPRINE 5 MG: 10 TABLET, FILM COATED ORAL at 08:42

## 2021-01-01 RX ADMIN — PANTOPRAZOLE SODIUM 40 MG: 40 TABLET, DELAYED RELEASE ORAL at 05:32

## 2021-01-01 RX ADMIN — NYSTATIN 5 ML: 100000 SUSPENSION ORAL at 16:10

## 2021-01-01 RX ADMIN — ACETAMINOPHEN 650 MG: 325 TABLET ORAL at 18:04

## 2021-01-01 RX ADMIN — CEFEPIME HYDROCHLORIDE 2000 MG: 2 INJECTION, POWDER, FOR SOLUTION INTRAVENOUS at 18:49

## 2021-01-01 RX ADMIN — Medication 1 PACKET: at 10:28

## 2021-01-01 RX ADMIN — ENOXAPARIN SODIUM 40 MG: 40 INJECTION SUBCUTANEOUS at 09:22

## 2021-01-01 RX ADMIN — INSULIN LISPRO 2 UNITS: 100 INJECTION, SOLUTION INTRAVENOUS; SUBCUTANEOUS at 23:35

## 2021-01-01 RX ADMIN — DIGOXIN 125 MCG: 125 TABLET ORAL at 08:49

## 2021-01-01 RX ADMIN — Medication 10 MEQ: at 13:34

## 2021-01-01 RX ADMIN — DIPHENOXYLATE HYDROCHLORIDE AND ATROPINE SULFATE 1 TABLET: 2.5; .025 TABLET ORAL at 20:14

## 2021-01-01 RX ADMIN — MORPHINE SULFATE 15 MG: 15 TABLET, FILM COATED, EXTENDED RELEASE ORAL at 23:04

## 2021-01-01 RX ADMIN — IOPAMIDOL 75 ML: 755 INJECTION, SOLUTION INTRAVENOUS at 21:10

## 2021-01-01 RX ADMIN — SODIUM CHLORIDE 1000 ML: 9 INJECTION, SOLUTION INTRAVENOUS at 01:50

## 2021-01-01 RX ADMIN — Medication 10 ML: at 08:50

## 2021-01-01 RX ADMIN — Medication 15 G: at 09:23

## 2021-01-01 RX ADMIN — DIGOXIN 125 MCG: 125 TABLET ORAL at 09:23

## 2021-01-01 RX ADMIN — INSULIN LISPRO 4 UNITS: 100 INJECTION, SOLUTION INTRAVENOUS; SUBCUTANEOUS at 11:26

## 2021-01-01 RX ADMIN — Medication 10 MEQ: at 13:02

## 2021-01-01 RX ADMIN — DIPHENOXYLATE HYDROCHLORIDE AND ATROPINE SULFATE 1 TABLET: 2.5; .025 TABLET ORAL at 17:36

## 2021-01-01 RX ADMIN — ASPIRIN 81 MG: 81 TABLET, CHEWABLE ORAL at 10:03

## 2021-01-01 RX ADMIN — LIDOCAINE HYDROCHLORIDE 5 ML: 20 SOLUTION ORAL; TOPICAL at 15:36

## 2021-01-01 RX ADMIN — LIDOCAINE HYDROCHLORIDE 5 ML: 20 SOLUTION ORAL; TOPICAL at 13:16

## 2021-01-01 RX ADMIN — Medication 10 ML: at 19:58

## 2021-01-01 RX ADMIN — MORPHINE SULFATE 15 MG: 15 TABLET, FILM COATED, EXTENDED RELEASE ORAL at 03:17

## 2021-01-01 RX ADMIN — MORPHINE SULFATE 2 MG: 2 INJECTION, SOLUTION INTRAMUSCULAR; INTRAVENOUS at 01:56

## 2021-01-01 RX ADMIN — INSULIN LISPRO 1 UNITS: 100 INJECTION, SOLUTION INTRAVENOUS; SUBCUTANEOUS at 18:07

## 2021-01-01 RX ADMIN — ASPIRIN 81 MG: 81 TABLET, CHEWABLE ORAL at 09:23

## 2021-01-01 RX ADMIN — FLUCONAZOLE 200 MG: 2 INJECTION, SOLUTION INTRAVENOUS at 18:35

## 2021-01-01 RX ADMIN — DIPHENOXYLATE HYDROCHLORIDE AND ATROPINE SULFATE 1 TABLET: 2.5; .025 TABLET ORAL at 21:01

## 2021-01-01 RX ADMIN — TAMSULOSIN HYDROCHLORIDE 0.4 MG: 0.4 CAPSULE ORAL at 10:21

## 2021-01-01 RX ADMIN — DILTIAZEM HYDROCHLORIDE 60 MG: 60 CAPSULE, EXTENDED RELEASE ORAL at 23:04

## 2021-01-01 RX ADMIN — POTASSIUM BICARBONATE 40 MEQ: 782 TABLET, EFFERVESCENT ORAL at 09:23

## 2021-01-01 RX ADMIN — TAMSULOSIN HYDROCHLORIDE 0.4 MG: 0.4 CAPSULE ORAL at 21:42

## 2021-01-01 RX ADMIN — ALBUMIN (HUMAN) 25 G: 0.25 INJECTION, SOLUTION INTRAVENOUS at 23:41

## 2021-01-01 RX ADMIN — DEXTROSE MONOHYDRATE 12.5 G: 25 INJECTION, SOLUTION INTRAVENOUS at 02:06

## 2021-01-01 RX ADMIN — INSULIN LISPRO 3 UNITS: 100 INJECTION, SOLUTION INTRAVENOUS; SUBCUTANEOUS at 21:32

## 2021-01-01 RX ADMIN — MORPHINE SULFATE 15 MG: 15 TABLET, FILM COATED, EXTENDED RELEASE ORAL at 13:15

## 2021-01-01 RX ADMIN — ACETAMINOPHEN 650 MG: 325 TABLET ORAL at 17:41

## 2021-01-01 RX ADMIN — Medication 10 MEQ: at 00:56

## 2021-01-01 RX ADMIN — MORPHINE SULFATE 15 MG: 15 TABLET, FILM COATED, EXTENDED RELEASE ORAL at 17:54

## 2021-01-01 RX ADMIN — DIPHENOXYLATE HYDROCHLORIDE AND ATROPINE SULFATE 1 TABLET: 2.5; .025 TABLET ORAL at 21:42

## 2021-01-01 RX ADMIN — MORPHINE SULFATE 2 MG: 2 INJECTION, SOLUTION INTRAMUSCULAR; INTRAVENOUS at 09:13

## 2021-01-01 RX ADMIN — INSULIN LISPRO 2 UNITS: 100 INJECTION, SOLUTION INTRAVENOUS; SUBCUTANEOUS at 18:36

## 2021-01-01 RX ADMIN — CEFEPIME HYDROCHLORIDE 2000 MG: 2 INJECTION, POWDER, FOR SOLUTION INTRAVENOUS at 10:46

## 2021-01-01 RX ADMIN — LIDOCAINE HYDROCHLORIDE 5 ML: 20 SOLUTION ORAL; TOPICAL at 10:27

## 2021-01-01 RX ADMIN — MORPHINE SULFATE 4 MG: 4 INJECTION, SOLUTION INTRAMUSCULAR; INTRAVENOUS at 14:16

## 2021-01-01 RX ADMIN — Medication 10 ML: at 12:16

## 2021-01-01 RX ADMIN — ACETAMINOPHEN 650 MG: 325 TABLET ORAL at 14:10

## 2021-01-01 RX ADMIN — MORPHINE SULFATE 15 MG: 15 TABLET, FILM COATED, EXTENDED RELEASE ORAL at 05:31

## 2021-01-01 RX ADMIN — Medication 81 MG: at 10:24

## 2021-01-01 RX ADMIN — LIDOCAINE HYDROCHLORIDE 5 ML: 20 SOLUTION ORAL; TOPICAL at 20:28

## 2021-01-01 RX ADMIN — SODIUM CHLORIDE: 9 INJECTION, SOLUTION INTRAVENOUS at 23:41

## 2021-01-01 RX ADMIN — LIDOCAINE HYDROCHLORIDE 5 ML: 20 SOLUTION ORAL; TOPICAL at 11:54

## 2021-01-01 RX ADMIN — LIDOCAINE HYDROCHLORIDE 5 ML: 20 SOLUTION ORAL; TOPICAL at 08:42

## 2021-01-01 RX ADMIN — CYCLOBENZAPRINE 5 MG: 10 TABLET, FILM COATED ORAL at 12:14

## 2021-01-01 RX ADMIN — ALBUMIN (HUMAN) 25 G: 0.25 INJECTION, SOLUTION INTRAVENOUS at 05:34

## 2021-01-01 RX ADMIN — MORPHINE SULFATE 4 MG: 4 INJECTION, SOLUTION INTRAMUSCULAR; INTRAVENOUS at 03:35

## 2021-01-01 RX ADMIN — DILTIAZEM HYDROCHLORIDE 60 MG: 60 CAPSULE, EXTENDED RELEASE ORAL at 21:02

## 2021-01-01 RX ADMIN — ENOXAPARIN SODIUM 40 MG: 40 INJECTION SUBCUTANEOUS at 08:25

## 2021-01-01 RX ADMIN — INSULIN LISPRO 4 UNITS: 100 INJECTION, SOLUTION INTRAVENOUS; SUBCUTANEOUS at 08:57

## 2021-01-01 RX ADMIN — DIGOXIN 125 MCG: 125 TABLET ORAL at 08:36

## 2021-01-01 RX ADMIN — FUROSEMIDE 40 MG: 10 INJECTION, SOLUTION INTRAMUSCULAR; INTRAVENOUS at 18:31

## 2021-01-01 RX ADMIN — DIGOXIN 125 MCG: 125 TABLET ORAL at 10:26

## 2021-01-01 RX ADMIN — DEXTROSE MONOHYDRATE 100 ML/HR: 50 INJECTION, SOLUTION INTRAVENOUS at 05:31

## 2021-01-01 RX ADMIN — TAMSULOSIN HYDROCHLORIDE 0.4 MG: 0.4 CAPSULE ORAL at 20:26

## 2021-01-01 RX ADMIN — LIDOCAINE HYDROCHLORIDE 5 ML: 20 SOLUTION ORAL; TOPICAL at 08:19

## 2021-01-01 RX ADMIN — TAMSULOSIN HYDROCHLORIDE 0.4 MG: 0.4 CAPSULE ORAL at 08:26

## 2021-01-01 RX ADMIN — CEFEPIME HYDROCHLORIDE 2000 MG: 2 INJECTION, POWDER, FOR SOLUTION INTRAVENOUS at 01:50

## 2021-01-01 RX ADMIN — ACETAMINOPHEN 650 MG: 325 TABLET ORAL at 09:46

## 2021-01-01 RX ADMIN — POTASSIUM CHLORIDE 40 MEQ: 1500 TABLET, EXTENDED RELEASE ORAL at 08:37

## 2021-01-01 RX ADMIN — ALBUMIN (HUMAN) 25 G: 0.25 INJECTION, SOLUTION INTRAVENOUS at 17:28

## 2021-01-01 RX ADMIN — Medication 10 MEQ: at 01:59

## 2021-01-01 RX ADMIN — DIPHENOXYLATE HYDROCHLORIDE AND ATROPINE SULFATE 1 TABLET: 2.5; .025 TABLET ORAL at 16:36

## 2021-01-01 RX ADMIN — MORPHINE SULFATE 2 MG: 2 INJECTION, SOLUTION INTRAMUSCULAR; INTRAVENOUS at 03:05

## 2021-01-01 RX ADMIN — FLUCONAZOLE 200 MG: 2 INJECTION, SOLUTION INTRAVENOUS at 17:28

## 2021-01-01 RX ADMIN — DIGOXIN 125 MCG: 125 TABLET ORAL at 12:15

## 2021-01-01 RX ADMIN — TAMSULOSIN HYDROCHLORIDE 0.4 MG: 0.4 CAPSULE ORAL at 08:20

## 2021-01-01 RX ADMIN — DIGOXIN 125 MCG: 125 TABLET ORAL at 10:21

## 2021-01-01 RX ADMIN — Medication 10 MEQ: at 23:00

## 2021-01-01 RX ADMIN — POTASSIUM CHLORIDE, DEXTROSE MONOHYDRATE AND SODIUM CHLORIDE: 300; 5; 450 INJECTION, SOLUTION INTRAVENOUS at 23:13

## 2021-01-01 RX ADMIN — TAMSULOSIN HYDROCHLORIDE 0.4 MG: 0.4 CAPSULE ORAL at 08:36

## 2021-01-01 RX ADMIN — MORPHINE SULFATE 15 MG: 15 TABLET, FILM COATED, EXTENDED RELEASE ORAL at 10:46

## 2021-01-01 RX ADMIN — POTASSIUM CHLORIDE 40 MEQ: 1500 TABLET, EXTENDED RELEASE ORAL at 10:38

## 2021-01-01 RX ADMIN — CEFEPIME HYDROCHLORIDE 2000 MG: 2 INJECTION, POWDER, FOR SOLUTION INTRAVENOUS at 10:52

## 2021-01-01 RX ADMIN — DILTIAZEM HYDROCHLORIDE 60 MG: 60 CAPSULE, EXTENDED RELEASE ORAL at 08:36

## 2021-01-01 RX ADMIN — INSULIN LISPRO 3 UNITS: 100 INJECTION, SOLUTION INTRAVENOUS; SUBCUTANEOUS at 21:02

## 2021-01-01 RX ADMIN — POTASSIUM CHLORIDE 10 MEQ: 7.46 INJECTION, SOLUTION INTRAVENOUS at 01:00

## 2021-01-01 RX ADMIN — Medication 10 ML: at 10:47

## 2021-01-01 RX ADMIN — DILTIAZEM HYDROCHLORIDE 60 MG: 60 CAPSULE, EXTENDED RELEASE ORAL at 19:58

## 2021-01-01 RX ADMIN — SODIUM CHLORIDE: 9 INJECTION, SOLUTION INTRAVENOUS at 11:37

## 2021-01-01 RX ADMIN — ASPIRIN 81 MG: 81 TABLET, COATED ORAL at 09:11

## 2021-01-01 RX ADMIN — INSULIN LISPRO 2 UNITS: 100 INJECTION, SOLUTION INTRAVENOUS; SUBCUTANEOUS at 20:21

## 2021-01-01 RX ADMIN — CYCLOBENZAPRINE 5 MG: 10 TABLET, FILM COATED ORAL at 20:05

## 2021-01-01 RX ADMIN — CYCLOBENZAPRINE 5 MG: 10 TABLET, FILM COATED ORAL at 13:19

## 2021-01-01 RX ADMIN — Medication 10 ML: at 21:23

## 2021-01-01 RX ADMIN — INSULIN LISPRO 3 UNITS: 100 INJECTION, SOLUTION INTRAVENOUS; SUBCUTANEOUS at 09:13

## 2021-01-01 RX ADMIN — LIDOCAINE HYDROCHLORIDE 5 ML: 20 SOLUTION ORAL; TOPICAL at 18:09

## 2021-01-01 RX ADMIN — PANTOPRAZOLE SODIUM 40 MG: 40 TABLET, DELAYED RELEASE ORAL at 11:13

## 2021-01-01 RX ADMIN — ASPIRIN 81 MG: 81 TABLET, CHEWABLE ORAL at 08:37

## 2021-01-01 RX ADMIN — INSULIN LISPRO 4 UNITS: 100 INJECTION, SOLUTION INTRAVENOUS; SUBCUTANEOUS at 16:36

## 2021-01-01 RX ADMIN — Medication 10 ML: at 13:10

## 2021-01-01 RX ADMIN — FLUCONAZOLE 400 MG: 100 TABLET ORAL at 10:24

## 2021-01-01 RX ADMIN — SODIUM CHLORIDE: 9 INJECTION, SOLUTION INTRAVENOUS at 09:47

## 2021-01-01 RX ADMIN — DIPHENOXYLATE HYDROCHLORIDE AND ATROPINE SULFATE 1 TABLET: 2.5; .025 TABLET ORAL at 10:03

## 2021-01-01 ASSESSMENT — PAIN SCALES - GENERAL
PAINLEVEL_OUTOF10: 0
PAINLEVEL_OUTOF10: 2
PAINLEVEL_OUTOF10: 0
PAINLEVEL_OUTOF10: 0
PAINLEVEL_OUTOF10: 2
PAINLEVEL_OUTOF10: 0
PAINLEVEL_OUTOF10: 0
PAINLEVEL_OUTOF10: 6
PAINLEVEL_OUTOF10: 4
PAINLEVEL_OUTOF10: 7
PAINLEVEL_OUTOF10: 7
PAINLEVEL_OUTOF10: 10
PAINLEVEL_OUTOF10: 0
PAINLEVEL_OUTOF10: 3
PAINLEVEL_OUTOF10: 0
PAINLEVEL_OUTOF10: 7
PAINLEVEL_OUTOF10: 5
PAINLEVEL_OUTOF10: 0
PAINLEVEL_OUTOF10: 1
PAINLEVEL_OUTOF10: 8
PAINLEVEL_OUTOF10: 5
PAINLEVEL_OUTOF10: 4
PAINLEVEL_OUTOF10: 0
PAINLEVEL_OUTOF10: 3
PAINLEVEL_OUTOF10: 0
PAINLEVEL_OUTOF10: 4
PAINLEVEL_OUTOF10: 4
PAINLEVEL_OUTOF10: 0
PAINLEVEL_OUTOF10: 3
PAINLEVEL_OUTOF10: 7
PAINLEVEL_OUTOF10: 7
PAINLEVEL_OUTOF10: 2
PAINLEVEL_OUTOF10: 4
PAINLEVEL_OUTOF10: 7
PAINLEVEL_OUTOF10: 5
PAINLEVEL_OUTOF10: 3
PAINLEVEL_OUTOF10: 10
PAINLEVEL_OUTOF10: 0
PAINLEVEL_OUTOF10: 2
PAINLEVEL_OUTOF10: 4
PAINLEVEL_OUTOF10: 2
PAINLEVEL_OUTOF10: 0
PAINLEVEL_OUTOF10: 8
PAINLEVEL_OUTOF10: 2

## 2021-01-01 ASSESSMENT — ENCOUNTER SYMPTOMS
EYE REDNESS: 0
RHINORRHEA: 0
ABDOMINAL PAIN: 0
EYE DISCHARGE: 0
DIARRHEA: 0
NAUSEA: 0
VOMITING: 0
SORE THROAT: 0
DIARRHEA: 0
RESPIRATORY NEGATIVE: 1
WHEEZING: 0
SHORTNESS OF BREATH: 0
CONSTIPATION: 0
SORE THROAT: 0
NAUSEA: 0
NAUSEA: 0
EYE REDNESS: 0
DIARRHEA: 0
RESPIRATORY NEGATIVE: 1
EYES NEGATIVE: 1
SHORTNESS OF BREATH: 0
WHEEZING: 0
EYES NEGATIVE: 1
DIARRHEA: 0
EYE DISCHARGE: 0
CHEST TIGHTNESS: 0
BACK PAIN: 0
TROUBLE SWALLOWING: 0
ABDOMINAL PAIN: 1
CONSTIPATION: 0
NAUSEA: 1
DIARRHEA: 0
TROUBLE SWALLOWING: 0
RHINORRHEA: 0
COUGH: 0
NAUSEA: 1
COUGH: 0
BACK PAIN: 0
ABDOMINAL PAIN: 0
SHORTNESS OF BREATH: 0

## 2021-01-01 ASSESSMENT — PAIN DESCRIPTION - LOCATION
LOCATION: HEAD;THROAT
LOCATION: BUTTOCKS
LOCATION: BACK;HEAD;THROAT
LOCATION: HEAD;MOUTH
LOCATION: THROAT
LOCATION: HEAD;BACK
LOCATION: THROAT;EAR

## 2021-01-01 ASSESSMENT — PAIN DESCRIPTION - FREQUENCY
FREQUENCY: CONTINUOUS
FREQUENCY: CONTINUOUS

## 2021-01-01 ASSESSMENT — PATIENT HEALTH QUESTIONNAIRE - PHQ9: SUM OF ALL RESPONSES TO PHQ QUESTIONS 1-9: 0

## 2021-01-01 ASSESSMENT — PAIN DESCRIPTION - ONSET
ONSET: ON-GOING
ONSET: ON-GOING

## 2021-01-01 ASSESSMENT — PAIN DESCRIPTION - PAIN TYPE
TYPE: ACUTE PAIN

## 2021-01-01 ASSESSMENT — PAIN - FUNCTIONAL ASSESSMENT: PAIN_FUNCTIONAL_ASSESSMENT: PREVENTS OR INTERFERES SOME ACTIVE ACTIVITIES AND ADLS

## 2021-01-01 ASSESSMENT — PAIN DESCRIPTION - DESCRIPTORS
DESCRIPTORS: SORE
DESCRIPTORS: ACHING;DISCOMFORT

## 2021-01-01 NOTE — PLAN OF CARE
Problem: Falls - Risk of:  Goal: Will remain free from falls  Description: Will remain free from falls  1/1/2021 1534 by Maurizio West RN  Outcome: Ongoing  1/1/2021 0345 by Micki Sampson RN  Outcome: Ongoing  Goal: Absence of physical injury  Description: Absence of physical injury  1/1/2021 1534 by Maurizio West RN  Outcome: Ongoing  1/1/2021 0345 by Micki Sampson RN  Outcome: Ongoing     Problem: Pain:  Goal: Pain level will decrease  Description: Pain level will decrease  1/1/2021 1534 by Maurizio West RN  Outcome: Ongoing  1/1/2021 0345 by iMcki Sampson RN  Outcome: Ongoing  Goal: Control of acute pain  Description: Control of acute pain  1/1/2021 1534 by Maurizio West RN  Outcome: Ongoing  1/1/2021 0345 by Micki Sampson RN  Outcome: Ongoing  Goal: Control of chronic pain  Description: Control of chronic pain  1/1/2021 1534 by Maurizio West RN  Outcome: Ongoing  1/1/2021 0345 by Micki Sampson RN  Outcome: Ongoing     Problem: Nutrition  Goal: Optimal nutrition therapy  1/1/2021 1534 by Maurizio West RN  Outcome: Ongoing  1/1/2021 0345 by Micki Sampson RN  Outcome: Ongoing     Problem: Skin Integrity:  Goal: Will show no infection signs and symptoms  Description: Will show no infection signs and symptoms  1/1/2021 1534 by Maurizio West RN  Outcome: Ongoing  1/1/2021 0345 by Micki Sampson RN  Outcome: Ongoing  Goal: Absence of new skin breakdown  Description: Absence of new skin breakdown  1/1/2021 1534 by Maurizio West RN  Outcome: Ongoing  1/1/2021 0345 by Micki Sampson RN  Outcome: Ongoing

## 2021-01-01 NOTE — PLAN OF CARE
Problem: Falls - Risk of:  Goal: Will remain free from falls  Description: Will remain free from falls  Outcome: Ongoing  Goal: Absence of physical injury  Description: Absence of physical injury  Outcome: Ongoing     Problem: Pain:  Goal: Pain level will decrease  Description: Pain level will decrease  Outcome: Ongoing  Goal: Control of acute pain  Description: Control of acute pain  Outcome: Ongoing  Goal: Control of chronic pain  Description: Control of chronic pain  Outcome: Ongoing     Problem: Nutrition  Goal: Optimal nutrition therapy  Outcome: Ongoing     Problem: Skin Integrity:  Goal: Will show no infection signs and symptoms  Description: Will show no infection signs and symptoms  Outcome: Ongoing  Goal: Absence of new skin breakdown  Description: Absence of new skin breakdown  Outcome: Ongoing

## 2021-01-01 NOTE — PROGRESS NOTES
Physical Therapy  Facility/Department: [address] ORTHO/NEURO NURSING  Daily Treatment Note  NAME: Kennedy Altamirano  : 1960  MRN: [id number]    Date of Service: 2021    Discharge Recommendations:  Kennedy Altamirano scored a 20/24 on the AM-PAC short mobility form. Current research shows that an AM-PAC score of 18 or greater is typically associated with a discharge to the patient's home setting. Based on the patient's AM-PAC score and their current functional mobility deficits, it is recommended that the patient have 2-3 sessions per week of Physical Therapy at d/c to increase the patient's independence. At this time, this patient demonstrates the endurance and safety to discharge home with home health PT and a follow up treatment frequency of 2-3x/wk. Please see assessment section for further patient specific details. If patient discharges prior to next session this note will serve as a discharge summary. Please see below for the latest assessment towards goals. S Level 3, 2-3 sessions per week, 24 hour supervision or assist   PT Equipment Recommendations  Equipment Needed: No  Other: Pt has RW at home    Assessment   Body structures, Functions, Activity limitations: Decreased functional mobility ; Decreased ADL status; Decreased ROM; Decreased strength;Decreased endurance;Decreased balance; Increased pain  Assessment: Patient declined to wear boot as he stated he needed to use bathroom immediately and donning boot takes too much time. Patient was able to perform all transfers and ambulation w/ stand by assist for safety. Recommend assist prn at d/c w/ continued therapy.   Treatment Diagnosis: balance/endurance deficits secondary to illness  Prognosis: Good  Decision Making: Medium Complexity  PT Education: Goals;PT Role;Plan of Care;Gait Training;Transfer Training;Functional Mobility Training  REQUIRES PT FOLLOW UP: Yes  Activity Tolerance  Activity Tolerance: Patient limited by fatigue;Patient limited by endurance     Patient Diagnosis(es): The primary encounter diagnosis was Hypokalemia. Diagnoses of Generalized weakness, Diarrhea, unspecified type, and Closed nondisplaced fracture of lateral malleolus of right fibula, initial encounter were also pertinent to this visit. has a past medical history of Allergic rhinitis, Cancer ([facility] [address] Gout, Hypertension, Prolonged emergence from general anesthesia, and Type II or unspecified type diabetes mellitus without mention of complication, not stated as uncontrolled. has a past surgical history that includes Knee arthroscopy (Left); Shoulder Arthroplasty (Right, 2012); Upper gastrointestinal endoscopy (02/21/2018); other surgical history (02/26/2018); Pancreas surgery; Cystocopy (06/19/2018); Endoscopy, colon, diagnostic; Port Surgery (Right, 11/2/2020); Port Surgery (Left, 11/2/2020); and sigmoidoscopy (N/A, 12/22/2020). Restrictions  Restrictions/Precautions  Restrictions/Precautions: Weight Bearing, Fall Risk  Required Braces or Orthoses?: Yes  Lower Extremity Weight Bearing Restrictions  Right Lower Extremity Weight Bearing: Weight Bearing As Tolerated  Required Braces or Orthoses  Right Lower Extremity Brace: Boot  RLE Brace Type: Tall walking boot  Position Activity Restriction  Other position/activity restrictions: Aure Pham is a 63 y.o. male who presents to the emergency department today for evaluation for general fatigue, nausea, and diarrhea. The patient has a history of pancreatic cancer, is followed by Dr. Jorge L Leung, oncology. The patient states that for the past few days he has had increasing fatigue, and multiple episodes of diarrhea. There has been no blood in the stool or black tarry appearance of the stool. He was recently admitted for GI bleed, colonoscopy at that time did show anal fissures and hemorrhoids. The patient was sent home from the hospital last Tuesday.   He has not really been eating or drinking much since being home. The family states that the patient had 3 falls 6 days ago, and family is concerned about the bruising to the ankle as well as to the foot. The patient did fall and hit his head 1 time next days ago, but there is no loss of consciousness. No vomiting. The patient has been acting normally since. Patient has no headaches the patient was supposed to have his therapy today, however after seeing his oncologist, having basic labs drawn, he was told to come to the emergency room because his potassium was low. The patient is complaining of generalized abdominal pain which he is rating is a 6/10. States that this is due to his normal pain that he has. He is not had any vomiting. He has no chest pain or shortness of breath. He has no fever chills. He has no cough or congestion. He has no urinary symptoms. Subjective   General  Chart Reviewed: Yes  Family / Caregiver Present: No  Subjective  Subjective: Pt agreeable to PT/OT eval.  General Comment  Comments: Pt supine in bed upon arrival.  Pain Screening  Patient Currently in Pain: Denies  Vital Signs  Patient Currently in Pain: Denies       Orientation  Orientation  Overall Orientation Status: Within Functional Limits     Objective   Bed mobility  Supine to Sit: Independent  Scooting: Independent  Transfers  Sit to Stand: Stand by assistance  Stand to sit: Stand by assistance  Ambulation  Ambulation?: Yes  Ambulation 1  Surface: level tile  Device: Rolling Walker  Assistance: Stand by assistance  Quality of Gait: Pt ambulates with decreased step length, slow panda, wide AALIYAH, decreased step height, decreased heel strike, no LOB. Distance: 13' to bathroom and 15' back.   Stairs/Curb  Stairs?: No     Balance  Posture: Fair  Sitting - Static: Good  Sitting - Dynamic: Good  Standing - Static: Fair;+  Standing - Dynamic: Fair      AROM RLE (degrees)  RLE AROM: WFL  AROM LLE (degrees)  LLE AROM : WFL  Strength RLE  Strength RLE: [facility]  Strength

## 2021-01-01 NOTE — PROGRESS NOTES
[facility] PROGRESS NOTE    1/1/2021 1:38 PM        Name: Kennedy Altamirano . Admitted: 12/29/2020  Primary Care Provider: Lobo Yun MD (Tel: [phone])      Subjective:  . Patient feeling slightly improved today. He was eating breakfast when I saw him. He continues to have some loose stools but not as bad. He denies abdominal pain. His mouth pain is much improved. He lives alone however his daughter has been staying with him the past few days as he has fallen 3 times. Seems to correlate with getting up and moving around or slipping he has had profuse diarrhea for several weeks now. He is going to the bathroom every hour. He denies abdominal pain. He states that he cannot eat a lot as it just goes right through him. He denies chest pain he started having some dysphagia and difficulties and painful swallowing yesterday. He was here a week ago with some rectal bleeding and had a flex sig revealing some anal fissures.     Reviewed interval ancillary notes    Current Medications      benzocaine-menthol (CEPACOL SORE THROAT) lozenge 1 lozenge, Q2H PRN      dextrose 5 % and 0.45 % NaCl with KCl 40 mEq infusion, Continuous      diphenoxylate-atropine (LOMOTIL) 2.5-0.025 MG per tablet 1 tablet, 4x Daily      potassium bicarb-citric acid (EFFER-K) effervescent tablet 40 mEq, BID      potassium chloride (KLOR-CON M) extended release tablet 40 mEq, PRN    Or      potassium bicarb-citric acid (EFFER-K) effervescent tablet 40 mEq, PRN    Or      potassium chloride 10 mEq/100 mL IVPB (Peripheral Line), PRN      digoxin (LANOXIN) tablet 125 mcg, Daily      dilTIAZem (CARDIZEM 12 HR) extended release capsule 60 mg, BID      magic (miracle) mouthwash with nystatin, 4x Daily PRN      perflutren lipid microspheres (DEFINITY) injection 1.65 mg, ONCE PRN      fluconazole (DIFLUCAN) in 0.9 % sodium chloride IVPB 200 mg, Q24H      octreotide (SANDOSTATIN) injection 100 mcg, TID PRN      HYDROmorphone HCl PF (DILAUDID) injection 0.5 mg, Q30 Min PRN      HYDROmorphone HCl PF (DILAUDID) injection 0.5 mg, Q4H PRN      HYDROcodone-acetaminophen (NORCO) 5-325 MG per tablet 1 tablet, Q4H PRN      tamsulosin (FLOMAX) capsule 0.4 mg, BID      insulin glargine (LANTUS;BASAGLAR) injection pen 28 Units, QAM      glucose (GLUTOSE) 40 % oral gel 15 g, PRN      dextrose 50 % IV solution, PRN      glucagon (rDNA) injection 1 mg, PRN      dextrose 5 % solution, PRN      sodium chloride flush 0.9 % injection 10 mL, 2 times per day      sodium chloride flush 0.9 % injection 10 mL, PRN      enoxaparin (LOVENOX) injection 40 mg, Daily      promethazine (PHENERGAN) tablet 12.5 mg, Q6H PRN    Or      ondansetron (ZOFRAN) injection 4 mg, Q6H PRN      polyethylene glycol (GLYCOLAX) packet 17 g, Daily PRN      acetaminophen (TYLENOL) tablet 650 mg, Q6H PRN    Or      acetaminophen (TYLENOL) suppository 650 mg, Q6H PRN      insulin lispro (1 Unit Dial) 0-12 Units, TID WC      insulin lispro (1 Unit Dial) 0-6 Units, Nightly      aspirin chewable tablet 81 mg, Daily        Objective:  /75   Pulse 83   Temp 98.3 °F (36.8 °C) (Oral)   Resp 16   Ht 5' 8\" (1.727 m)   Wt 184 lb (83.5 kg)   SpO2 99%   BMI 27.98 kg/m²     Intake/Output Summary (Last 24 hours) at 1/1/2021 1338  Last data filed at 1/1/2021 0517  Gross per 24 hour   Intake 901 ml   Output 800 ml   Net 101 ml      Wt Readings from Last 3 Encounters:   12/29/20 184 lb (83.5 kg)   12/21/20 200 lb (90.7 kg)   11/02/20 205 lb (93 kg)       General appearance:  Appears comfortable  Eyes: Sclera clear. Pupils equal.  ENT: Moist oral mucosa. Trachea midline, no adenopathy. Cardiovascular: Regular rhythm, normal S1, S2. No murmur. No edema in lower extremities  Respiratory: Not using accessory muscles. Good inspiratory effort.  Clear to auscultation bilaterally, no wheeze or crackles. GI: Abdomen soft, no tenderness, not distended, normal bowel sounds  Musculoskeletal: No cyanosis in digits, neck supple  Neurology: CN 2-12 grossly intact. No speech or motor deficits  Psych: Normal affect. Alert and oriented in time, place and person  Skin: Warm, dry, normal turgor    Labs and Tests:  CBC:   Recent Labs     12/30/20  0655 12/31/20  0600 01/01/21  1120   WBC 6.5 5.1 5.0   HGB 9.6* 8.4* 8.9*    187 179     BMP:    Recent Labs     12/31/20  0600 12/31/20  1646 01/01/21  1120    133* 134*   K 2.6* 2.5* 3.3*   * 104 105   CO2 21 22 22   BUN 8 8 9   CREATININE 1.3 1.2 1.2   GLUCOSE 162* 103* 132*     Hepatic:   Recent Labs     12/29/20  1405   AST 18   ALT 16   BILITOT 0.4   ALKPHOS 148*       CT HEAD WO CONTRAST   Final Result   No acute intracranial abnormality.           XR FOOT RIGHT (MIN 3 VIEWS)   Final Result   Slight soft tissue swelling question in the forefoot. No acute bony   abnormality. Bones are osteoporotic.           XR ANKLE RIGHT (MIN 3 VIEWS)   Final Result   Minimally displaced fracture of the lateral malleolus.       Possible very small coexistent fracture the periarticular anterior tibia,   versus artifact from overlying shadows. CT abd/pelvis 12/21  1. No evidence of acute GI bleed. 2. Prior history of Whipple procedure with no abnormalities of the GI tract   noted otherwise. 3. Stable necrotic lesion in the retroperitoneum anterior to the aorta   suspected to represent a necrotic lymph node.  Correlate with prior oncology   and surgical history. 4. Stable hepatic cyst in the caudate lobe.  A subtle area of low attenuation   in the right hepatic lobe cannot be characterized by CT but does appear   similar to prior exam.  MRI may be considered for further evaluation if   clinically indicated. 5. Bilateral nonobstructing nephrolithiasis.        Problem List  Active Problems:    Diarrhea    Pancytopenia ([address] Malleolar fracture, right, closed, initial encounter    Hypokalemia    Atrial fibrillation (HCC)    Multifocal atrial tachycardia (HCC)    Hyponatremia  Resolved Problems:    * No resolved hospital problems. *       Assessment & Plan:   1. Toxic enterocolitis-  Secondary to chemotherapy. Stool studies unremarkable. Continue octreotide. We will schedule Lomotil. Had flex sig last week which just revealed fissures. Continue IV fluids but add dextrose and potassium. 2. Metabolic acidosis-resolved. DC the bicarb. 3. severe hypokalemia-improving. Continue scheduled potassium twice daily. We will add some potassium to his IV fluids. Dc the lasix. 4. Hypomagnesemia-resolved after replacement  5. Severe oral candidiasis/mucositis-continue IV diflucan, continue magic mouthwash with nystatin. This looks much better today. 6. afib versus MAT-P on board. On p.o. Cardizem and digoxin. No need for anticoagulation. 7. R malleolar fracture-non surgical. Ortho on board. PT/OT to assess. 8. Dm 2-patient hypoglycemic this morning-decrease Lantus to 15 and continue sliding scale insulin. A1c is 10.  9. Syncope, recurrent falls-likely orthostatic hypotension from profuse diarrhea.        Diet: Dietary Nutrition Supplements: Diabetic Oral Supplement  DIET CARB CONTROL;  Code:Full Code  DVT PPX: jhonatan Le PA-C   1/1/2021 1:38 PM
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
## 2021-01-01 NOTE — PROGRESS NOTES
Physical Therapy  Patient declined therapy at this time as he is resting.   Denise Cavanaugh, [facility], DPT, ATC-R [id number]

## 2021-01-01 NOTE — PROGRESS NOTES
[facility]   Progress Note      1/1/2021 1:02 PM        Name: Fide Driver . Admitted: 12/29/2020    SUBJECTIVE:  Feeling better. Appetite still not great. Mouth soreness decreased. Persistent diarrhea.      Reviewed interval ancillary notes    Current Medications      benzocaine-menthol (CEPACOL SORE THROAT) lozenge 1 lozenge, Q2H PRN      potassium bicarb-citric acid (EFFER-K) effervescent tablet 40 mEq, TID      dextrose 5 % and 0.45 % NaCl with KCl 40 mEq infusion, Continuous      diphenoxylate-atropine (LOMOTIL) 2.5-0.025 MG per tablet 1 tablet, 4x Daily      potassium chloride (KLOR-CON M) extended release tablet 40 mEq, PRN    Or      potassium bicarb-citric acid (EFFER-K) effervescent tablet 40 mEq, PRN    Or      potassium chloride 10 mEq/100 mL IVPB (Peripheral Line), PRN      digoxin (LANOXIN) tablet 125 mcg, Daily      dilTIAZem (CARDIZEM 12 HR) extended release capsule 60 mg, BID      magic (miracle) mouthwash with nystatin, 4x Daily PRN      perflutren lipid microspheres (DEFINITY) injection 1.65 mg, ONCE PRN      fluconazole (DIFLUCAN) in 0.9 % sodium chloride IVPB 200 mg, Q24H      octreotide (SANDOSTATIN) injection 100 mcg, TID PRN      HYDROmorphone HCl PF (DILAUDID) injection 0.5 mg, Q30 Min PRN      HYDROmorphone HCl PF (DILAUDID) injection 0.5 mg, Q4H PRN      HYDROcodone-acetaminophen (NORCO) 5-325 MG per tablet 1 tablet, Q4H PRN      tamsulosin (FLOMAX) capsule 0.4 mg, BID      insulin glargine (LANTUS;BASAGLAR) injection pen 28 Units, QAM      glucose (GLUTOSE) 40 % oral gel 15 g, PRN      dextrose 50 % IV solution, PRN      glucagon (rDNA) injection 1 mg, PRN      dextrose 5 % solution, PRN      sodium chloride flush 0.9 % injection 10 mL, 2 times per day      sodium chloride flush 0.9 % injection 10 mL, PRN      enoxaparin (LOVENOX) injection 40 mg, Daily      promethazine (PHENERGAN) tablet 12.5 mg, Q6H PRN    Or     ondansetron (ZOFRAN) injection 4 mg, Q6H PRN      polyethylene glycol (GLYCOLAX) packet 17 g, Daily PRN      acetaminophen (TYLENOL) tablet 650 mg, Q6H PRN    Or      acetaminophen (TYLENOL) suppository 650 mg, Q6H PRN      insulin lispro (1 Unit Dial) 0-12 Units, TID WC      insulin lispro (1 Unit Dial) 0-6 Units, Nightly      aspirin chewable tablet 81 mg, Daily        Objective:  /72   Pulse 61   Temp 98 °F (36.7 °C) (Oral)   Resp 16   Ht 5' 8\" (1.727 m)   Wt 184 lb (83.5 kg)   SpO2 99%   BMI 27.98 kg/m²     Intake/Output Summary (Last 24 hours) at 1/1/2021 1302  Last data filed at 1/1/2021 0517  Gross per 24 hour   Intake 901 ml   Output 800 ml   Net 101 ml      Wt Readings from Last 3 Encounters:   12/29/20 184 lb (83.5 kg)   12/21/20 200 lb (90.7 kg)   11/02/20 205 lb (93 kg)       General appearance:  Appears comfortable. Color improved. Eyes: Sclera clear. Pupils equal.  ENT: Moist oral mucosa. Trachea midline, no adenopathy. Thrush and oral mucositis improved significantly. Cardiovascular: Regular rhythm, normal S1, S2. No murmur. No edema in lower extremities  Respiratory: Not using accessory muscles. Good inspiratory effort. Clear to auscultation bilaterally, no wheeze or crackles. GI: Abdomen soft, no tenderness, not distended  Musculoskeletal: No cyanosis in digits, neck supple  Neurology: CN 2-12 grossly intact. No speech or motor deficits  Psych: Normal affect.  Alert and oriented in time, place and person  Skin: Warm, dry, normal turgor    Labs and Tests:  CBC:   Recent Labs     12/30/20  0655 12/31/20  0600 01/01/21  1120   WBC 6.5 5.1 5.0   HGB 9.6* 8.4* 8.9*    187 179     BMP:    Recent Labs     12/31/20  0600 12/31/20  1646 01/01/21  1120    133* 134*   K 2.6* 2.5* 3.3*   * 104 105   CO2 21 22 22   BUN 8 8 9   CREATININE 1.3 1.2 1.2   GLUCOSE 162* 103* 132*     Hepatic:   Recent Labs     12/29/20  1405   AST 18   ALT 16   BILITOT 0.4   ALKPHOS 148* ASSESSMENT AND PLAN    Active Problems:    Diarrhea    Pancytopenia ([address]    Malleolar fracture, right, closed, initial encounter    Hypokalemia    Atrial fibrillation ([address]    Multifocal atrial tachycardia (HCC)    Hyponatremia  Resolved Problems:    * No resolved hospital problems. 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 Mucositis and likely some oropharyngeal candidiasis. Weight loss. Dehydration. Anemia due to chemotherapy. Hypokalemia and hypomagnesemia. Metabolic acidosis. Diabetes. Metastatic pancreatic carcinoma. Treated with surgery and chemotherapy. Had gemcitabine and abraxane. Recently started on Onivyde.      Reviewed his labs. He is improving. Continue Lomotil and Octreotide. Potassium and magnesium supplements. Discussed with hospitalist service.        Fauzia Hackett MD  [facility]

## 2021-01-01 NOTE — PROGRESS NOTES
Assessment complete. /64, HR 80 at this time; first dose Cardizem 60 mg ER due, previous 30 mg doses dropped SBP approximately 10 points; will recheck BP and HR, and reconsider administration. C/o throat pain, given popsicle for relief. Potassium to be replaced IV this shift per hospitalist Dina Mckeon's orders. Tele 7 in place. The care plan and education has been reviewed and mutually agreed upon with the patient. Up to chair, chair alarm on, bedside table and call light within reach. No further needs expressed at this time. 2359  First replacement bag of IV potassium hung by RN Joe Wong. 0044  Manual BP 92/60, radial HR 67 and irregular. Cardizem held per hospitalist Linda's orders. Assisted to BR, loose BM.    0100  Second replacement bag of IV potassium hung by [employer]. 1794  C/o throat pain, given PRN Tylenol and PRN Cepacol lozenge per [facility] for relief.    0324  Third replacement bag of IV potassium hung by this RN.    6350  Fourth replacement bag of IV potassium hung by this RN.    4355  Fifth replacement bag of IV potassium hung by this RN. Assisted to BR, loose BM; given PRN Lomotil per [facility].    0209  Sixth replacement bag of IV potassium hung by this RN.

## 2021-01-02 NOTE — PLAN OF CARE
Problem: Falls - Risk of:  Goal: Will remain free from falls  Description: Will remain free from falls  1/2/2021 1022 by Quentin Shay RN  Outcome: Ongoing  1/1/2021 2149 by Guerrero Montes RN  Outcome: Ongoing  Goal: Absence of physical injury  Description: Absence of physical injury  1/2/2021 1022 by Quentin Shay RN  Outcome: Ongoing  1/1/2021 2149 by Guerrero Montes RN  Outcome: Ongoing     Problem: Pain:  Goal: Pain level will decrease  Description: Pain level will decrease  1/2/2021 1022 by Quentin Shay RN  Outcome: Ongoing  1/1/2021 2149 by Guerrero Montes RN  Outcome: Ongoing  Goal: Control of acute pain  Description: Control of acute pain  1/2/2021 1022 by Quentin Shay RN  Outcome: Ongoing  1/1/2021 2149 by Guerrero Montes RN  Outcome: Ongoing  Goal: Control of chronic pain  Description: Control of chronic pain  1/2/2021 1022 by Quentin Shay RN  Outcome: Ongoing  1/1/2021 2149 by Guerrero Montes RN  Outcome: Ongoing     Problem: Nutrition  Goal: Optimal nutrition therapy  1/2/2021 1022 by Quentin Shay RN  Outcome: Ongoing  1/1/2021 2149 by Guerrero Montes RN  Outcome: Ongoing     Problem: Skin Integrity:  Goal: Will show no infection signs and symptoms  Description: Will show no infection signs and symptoms  1/2/2021 1022 by Quentin Shay RN  Outcome: Ongoing  1/1/2021 2149 by Guerrero Montes RN  Outcome: Ongoing  Goal: Absence of new skin breakdown  Description: Absence of new skin breakdown  1/2/2021 1022 by Quentin Shay RN  Outcome: Ongoing  1/1/2021 2149 by Guerrero Montes RN  Outcome: Ongoing

## 2021-01-02 NOTE — PROGRESS NOTES
[facility]   Progress Note      1/2/2021 4:06 PM        Name: Chasity Last . Admitted: 12/29/2020    SUBJECTIVE:  Continues to improve. Increased oral intake. Denies nausea or vomiting at present. Diarrhea has become less. No cramps.       Reviewed interval ancillary notes    Current Medications      potassium chloride (KLOR-CON M) extended release tablet 40 mEq, BID WC      benzocaine-menthol (CEPACOL SORE THROAT) lozenge 1 lozenge, Q2H PRN      diphenoxylate-atropine (LOMOTIL) 2.5-0.025 MG per tablet 1 tablet, 4x Daily      insulin glargine (LANTUS;BASAGLAR) injection pen 15 Units, QAM      potassium chloride (KLOR-CON M) extended release tablet 40 mEq, PRN    Or      potassium bicarb-citric acid (EFFER-K) effervescent tablet 40 mEq, PRN    Or      potassium chloride 10 mEq/100 mL IVPB (Peripheral Line), PRN      digoxin (LANOXIN) tablet 125 mcg, Daily      dilTIAZem (CARDIZEM 12 HR) extended release capsule 60 mg, BID      magic (miracle) mouthwash with nystatin, 4x Daily PRN      perflutren lipid microspheres (DEFINITY) injection 1.65 mg, ONCE PRN      fluconazole (DIFLUCAN) in 0.9 % sodium chloride IVPB 200 mg, Q24H      octreotide (SANDOSTATIN) injection 100 mcg, TID PRN      HYDROmorphone HCl PF (DILAUDID) injection 0.5 mg, Q30 Min PRN      HYDROmorphone HCl PF (DILAUDID) injection 0.5 mg, Q4H PRN      HYDROcodone-acetaminophen (NORCO) 5-325 MG per tablet 1 tablet, Q4H PRN      tamsulosin (FLOMAX) capsule 0.4 mg, BID      glucose (GLUTOSE) 40 % oral gel 15 g, PRN      dextrose 50 % IV solution, PRN      glucagon (rDNA) injection 1 mg, PRN      dextrose 5 % solution, PRN      sodium chloride flush 0.9 % injection 10 mL, 2 times per day      sodium chloride flush 0.9 % injection 10 mL, PRN      enoxaparin (LOVENOX) injection 40 mg, Daily      promethazine (PHENERGAN) tablet 12.5 mg, Q6H PRN    Or      ondansetron (ZOFRAN) injection 4 mg, Q6H PRN      polyethylene glycol (GLYCOLAX) packet 17 g, Daily PRN      acetaminophen (TYLENOL) tablet 650 mg, Q6H PRN    Or      acetaminophen (TYLENOL) suppository 650 mg, Q6H PRN      insulin lispro (1 Unit Dial) 0-12 Units, TID WC      insulin lispro (1 Unit Dial) 0-6 Units, Nightly      aspirin chewable tablet 81 mg, Daily        Objective:  /84   Pulse 71   Temp 97.5 °F (36.4 °C) (Oral)   Resp 16   Ht 5' 8\" (1.727 m)   Wt 184 lb (83.5 kg)   SpO2 94%   BMI 27.98 kg/m²     Intake/Output Summary (Last 24 hours) at 1/2/2021 1606  Last data filed at 1/1/2021 2121  Gross per 24 hour   Intake 240 ml   Output 275 ml   Net -35 ml      Wt Readings from Last 3 Encounters:   12/29/20 184 lb (83.5 kg)   12/21/20 200 lb (90.7 kg)   11/02/20 205 lb (93 kg)       General appearance:  Appears comfortable. Color improved. Eyes: Sclera clear. Pupils equal.  ENT: Moist oral mucosa. Trachea midline, no adenopathy. Thrush and oral mucositis improved significantly. Cardiovascular: Regular rhythm, normal S1, S2. No murmur. No edema in lower extremities  Respiratory: Not using accessory muscles. Good inspiratory effort. Clear to auscultation bilaterally, no wheeze or crackles. GI: Abdomen soft, no tenderness, not distended  Musculoskeletal: No cyanosis in digits, neck supple  Neurology: CN 2-12 grossly intact. No speech or motor deficits  Psych: Normal affect. Alert and oriented in time, place and person  Skin: Warm, dry, normal turgor    Labs and Tests:  CBC:   Recent Labs     12/31/20  0600 01/01/21  1120 01/02/21  0900   WBC 5.1 5.0 2.4*   HGB 8.4* 8.9* 8.2*    179 146     BMP:    Recent Labs     12/31/20  1646 01/01/21  1120 01/02/21  0900   * 134* 135*   K 2.5* 3.3* 3.5    105 108   CO2 22 22 21   BUN 8 9 8   CREATININE 1.2 1.2 0.9   GLUCOSE 103* 132* 226*     Hepatic:   No results for input(s): AST, ALT, ALB, BILITOT, ALKPHOS in the last 72 hours.     Echo Complete    Result Date: 12/30/2020  Transthoracic Echocardiography Report (TTE)  Demographics   Patient Name      Cindi Callahan   Date of Study     12/30/2020          Gender              Male   Patient Number    [id number]          Date of Birth       1960   Visit Number      [id number]           Age                 63 year(s)   Accession Number  [id number]          Room Number         0600   Corporate ID      [id number]            Fred Beach RVT, BS   Ordering          Kerry Ego, Interpreting        Jesús Faria MD  Physician         MD                  Physician  Procedure Type of Study   TTE procedure:ECHOCARDIOGRAM COMPLETE 2D W DOPPLER W COLOR. Procedure Date Date: 12/30/2020 Start: 08:40 AM Study Location: [facility] - Echo Lab Technical Quality: Good visualization Indications:Atrial fibrillation. Patient Status: Routine Height: 68 inches Weight: 184 pounds BSA: 1.97 m2 BMI: 27.98 kg/m2 BP: 125/72 mmHg  Conclusions   Summary  *Left ventricle - normal size, thickness and function with EF of 60%  *Mitral valve - trivial regurgitation  *Aortic valve - sclerotic  *Tricuspid valve - trivial regurgitation   Signature   ------------------------------------------------------------------  Electronically signed by Jesús Faria MD (Interpreting  physician) on 12/30/2020 at 03:05 PM  ------------------------------------------------------------------   Findings   Left Ventricle  Normal left ventricle size, wall thickness, and systolic function with an  estimated ejection fraction of 60%. No regional wall motion abnormalities  are seen. Average E/e': 7.9. Indeterminate diastolic function. Mitral Valve  The mitral valve normal in structure and function. Trivial mitral regurgitation is present. Left Atrium  The left atrium is normal in size.    Aortic Valve  The aortic valve is structurally normal. Aortic valve appears sclerotic but  opens adequately. Tricuspid aortic valve. No evidence of aortic valve  regurgitation or stenosis. Aorta  The aortic root is normal in size. The ascending aorta is normal in size. Right Ventricle  The right ventricle is normal in size and function. TAPSE: 1.9 cm. RV S' velocity: 19.4 cm/s. Tricuspid Valve  The tricuspid valve is normal in structure and function. Trivial tricuspid  regurgitation. PASP could not be determined due to inadequate TR envelope. Right Atrium  The right atrial size is normal.   Pulmonic Valve  Not well seen. There is no evidence of pulmonic valve regurgitation or  stenosis. Pericardial Effusion  No pericardial effusion noted. Pleural Effusion  No pleural effusion. Miscellaneous  Could not visualize subcostal views due to previous Whipple procedure.   M-Mode/2D Measurements (cm)   LV Diastolic Dimension: 4.85 cm LV Systolic Dimension: 8.57 cm  LV Septum Diastolic: 1.92 cm  LV PW Diastolic: 8.54 cm        AO Root Dimension: 3.6 cm                                  LA Dimension: 2.5 cm                                  LA Area: 18.2 cm2  LVOT: 2.3 cm                    LA volume/Index: 46 ml /23 ml/m2  Doppler Measurements   AV Peak Velocity: 183 cm/s     MV Peak E-Wave: 73.7 cm/s  AV Peak Gradient: 13.4 mmHg    MV Peak A-Wave: 63.3 cm/s  AV Mean Gradient: 7 mmHg       MV E/A Ratio: 1.16  LVOT Peak Velocity: 124 cm/s  AV Area (Continuity):3.21 cm2   TR Velocity:246 cm/s  TR Gradient:24.21 mmHg         MV Deceleration Time: 243 msec   E' Septal Velocity: 8.39 cm/s  E' Lateral Velocity: 10.6 cm/s  PV Peak Velocity: 143 cm/s  PV Peak Gradient: 8.18 mmHg   Aortic Valve   Peak Velocity: 183 cm/s     Mean Velocity: 128 cm/s  Peak Gradient: 13.4 mmHg    Mean Gradient: 7 mmHg  Area (continuity): 3.21 cm2  AV VTI: 26.5 cm  Aorta   Aortic Root: 3.6 cm  Ascending Aorta: 3.6 cm  LVOT Diameter: 2.3 cm      Xr Ankle Right (min 3 Views)    Result Date: 12/29/2020  EXAMINATION: THREE XRAY VIEWS OF THE RIGHT ANKLE 12/29/2020 3:28 pm COMPARISON: None. HISTORY: ORDERING SYSTEM PROVIDED HISTORY: injury TECHNOLOGIST PROVIDED HISTORY: Reason for exam:->injury Reason for Exam: right ankle pain Acuity: Acute Type of Exam: Initial Mechanism of Injury: fall FINDINGS: There is a minimally displaced fracture of the lateral malleolus. There also appears to be very small fracture involving the periarticular anterior tibia, shown on the lateral image. No dislocation. Minimally displaced fracture of the lateral malleolus. Possible very small coexistent fracture the periarticular anterior tibia, versus artifact from overlying shadows. Xr Foot Right (min 3 Views)    Result Date: 12/29/2020  EXAMINATION: XRAY VIEWS OF THE RIGHT FOOT 12/29/2020 3:28 pm COMPARISON: None. HISTORY: ORDERING SYSTEM PROVIDED HISTORY: fall TECHNOLOGIST PROVIDED HISTORY: Reason for exam:->fall Reason for Exam: right foot pain Acuity: Acute Type of Exam: Initial Mechanism of Injury: fall FINDINGS: No acute fracture or dislocation present. Slight soft tissue swelling of the forefoot may be present. The bones are osteoporotic. No significant degenerative changes. Slight soft tissue swelling question in the forefoot. No acute bony abnormality. Bones are osteoporotic. Ct Head Wo Contrast    Result Date: 12/29/2020  EXAMINATION: CT OF THE HEAD WITHOUT CONTRAST  12/29/2020 5:06 pm TECHNIQUE: CT of the head was performed without the administration of intravenous contrast. Dose modulation, iterative reconstruction, and/or weight based adjustment of the mA/kV was utilized to reduce the radiation dose to as low as reasonably achievable. COMPARISON: None. HISTORY: ORDERING SYSTEM PROVIDED HISTORY: fall TECHNOLOGIST PROVIDED HISTORY: Reason for exam:->fall Has a \"code stroke\" or \"stroke alert\" been called? ->No Reason for Exam: fall Acuity: Acute Type of Exam: Initial Mechanism of Injury: fall base of the heart is normal. Organs: 6.4 cm cyst in the caudate lobe that appears stable. The gallbladder is surgically absent. Subtle area of decreased attenuation in the right hepatic lobe measuring approximately 9 mm similar to prior imaging. This is indeterminate. Prior surgical change of Whipple procedure. Postsurgical appearance of the pancreas with body and tail unremarkable. The spleen is normal.  Multiple nonobstructing calculi in the bilateral kidneys. No hydronephrosis or hydroureter. The adrenal glands are normal. GI/Bowel: Stomach and small bowel show no significant abnormalities status post prior Whipple. There is no mucosal inflammation or edema. No pattern of obstruction. A normal appendix is visualized. No mucosal abnormalities of the colon. Pelvis: The bladder is unremarkable. The prostate is enlarged and demonstrates heterogeneous attenuation. Peritoneum/Retroperitoneum: The aorta tapers normally. Stable necrotic structure in the retroperitoneum anterior to the aorta just below the level of the SMA. This was described as a necrotic lymph node on prior CT. No additional areas of lymph node enlargement. Bones/Soft Tissues: Remote L1 compression fracture with minimal loss of height to the superior endplate. 1. No evidence of acute GI bleed. 2. Prior history of Whipple procedure with no abnormalities of the GI tract noted otherwise. 3. Stable necrotic lesion in the retroperitoneum anterior to the aorta suspected to represent a necrotic lymph node. Correlate with prior oncology and surgical history. 4. Stable hepatic cyst in the caudate lobe. A subtle area of low attenuation in the right hepatic lobe cannot be characterized by CT but does appear similar to prior exam.  MRI may be considered for further evaluation if clinically indicated. 5. Bilateral nonobstructing nephrolithiasis.      ASSESSMENT AND PLAN    Active Problems:    Diarrhea    Pancytopenia ([address]    Malleolar fracture, right, closed, initial encounter    Hypokalemia    Atrial fibrillation (HCC)    Multifocal atrial tachycardia (HCC)    Hyponatremia  Resolved Problems:    * No resolved hospital problems. 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 Improving. Mucositis and likely some oropharyngeal candidiasis. Improving. Weight loss. Dehydration. Leukopenia and anemia due to chemotherapy. Hypokalemia and hypomagnesemia. Improving. Metabolic acidosis. Diabetes. Metastatic pancreatic carcinoma. Treated with surgery and chemotherapy. Had gemcitabine and abraxane. Recently started on Onivyde. Minimally displaced fracture of the lateral malleolus.     Reviewed his labs. He continues to improve. Counts are worse due to chemotherapy. Continue supportive care.         Chloe Dueñas MD  [facility]

## 2021-01-02 NOTE — PROGRESS NOTES
Shift assessment complete see flowsheets, meds per orders see eMAR. Patient alert and oriented x4, ambulating standby assist with walker. Tylenol given for mild aches and sore throat. Offered snacks and drinks at bedtime, patient declined. Will continue to monitor. The care plan and education has been reviewed and mutually agreed upon with the patient. Patient remains free from falls. All fall precautions in place. Yellow blanket at bedside, yellow bracelet on patient. SAFE sign on door. Bed and chair alarms being used. Bed in lowest position. Will monitor.      Electronically signed by Lorri Collet, RN on 1/1/2021 at 9:49 PM

## 2021-01-02 NOTE — PROGRESS NOTES
[facility]    1/2/2021 12:48 PM        Name: Misael Sharp . Admitted: 12/29/2020  Primary Care Provider: Jhoana Weiss MD (Tel: [phone])      Subjective:  . Patient admitted with severe hypokalemia secondary to toxic enterocolitis as a result of chemotherapy. He also had severe mucositis with some oral candidiasis. This has improved significantly. He was seen this morning and had a bowel movement at 4 AM and then again at 8 AM.  The bowel movement at 8 AM was hard and painful. He is now complaining of being constipated even though he had one formed bowel movement. He lives alone however his daughter has been staying with him the past few days as he has fallen 3 times. Seems to correlate with getting up and moving around or slipping he has had profuse diarrhea for several weeks now. He is going to the bathroom every hour. He denies abdominal pain. He states that he cannot eat a lot as it just goes right through him. He denies chest pain he started having some dysphagia and difficulties and painful swallowing yesterday. He was here a week ago with some rectal bleeding and had a flex sig revealing some anal fissures.     Reviewed interval ancillary notes    Current Medications      potassium chloride (KLOR-CON M) extended release tablet 40 mEq, BID WC      benzocaine-menthol (CEPACOL SORE THROAT) lozenge 1 lozenge, Q2H PRN      diphenoxylate-atropine (LOMOTIL) 2.5-0.025 MG per tablet 1 tablet, 4x Daily      insulin glargine (LANTUS;BASAGLAR) injection pen 15 Units, QAM      potassium chloride (KLOR-CON M) extended release tablet 40 mEq, PRN    Or      potassium bicarb-citric acid (EFFER-K) effervescent tablet 40 mEq, PRN    Or      potassium chloride 10 mEq/100 mL IVPB (Peripheral Line), PRN      digoxin (LANOXIN) tablet 125 mcg, Daily      dilTIAZem (CARDIZEM 12 HR) extended release capsule 60 mg, BID      magic (miracle) mouthwash with nystatin, 4x Daily PRN      perflutren lipid microspheres (DEFINITY) injection 1.65 mg, ONCE PRN      fluconazole (DIFLUCAN) in 0.9 % sodium chloride IVPB 200 mg, Q24H      octreotide (SANDOSTATIN) injection 100 mcg, TID PRN      HYDROmorphone HCl PF (DILAUDID) injection 0.5 mg, Q30 Min PRN      HYDROmorphone HCl PF (DILAUDID) injection 0.5 mg, Q4H PRN      HYDROcodone-acetaminophen (NORCO) 5-325 MG per tablet 1 tablet, Q4H PRN      tamsulosin (FLOMAX) capsule 0.4 mg, BID      glucose (GLUTOSE) 40 % oral gel 15 g, PRN      dextrose 50 % IV solution, PRN      glucagon (rDNA) injection 1 mg, PRN      dextrose 5 % solution, PRN      sodium chloride flush 0.9 % injection 10 mL, 2 times per day      sodium chloride flush 0.9 % injection 10 mL, PRN      enoxaparin (LOVENOX) injection 40 mg, Daily      promethazine (PHENERGAN) tablet 12.5 mg, Q6H PRN    Or      ondansetron (ZOFRAN) injection 4 mg, Q6H PRN      polyethylene glycol (GLYCOLAX) packet 17 g, Daily PRN      acetaminophen (TYLENOL) tablet 650 mg, Q6H PRN    Or      acetaminophen (TYLENOL) suppository 650 mg, Q6H PRN      insulin lispro (1 Unit Dial) 0-12 Units, TID WC      insulin lispro (1 Unit Dial) 0-6 Units, Nightly      aspirin chewable tablet 81 mg, Daily        Objective:  /84   Pulse 71   Temp 97.5 °F (36.4 °C) (Oral)   Resp 16   Ht 5' 8\" (1.727 m)   Wt 184 lb (83.5 kg)   SpO2 94%   BMI 27.98 kg/m²     Intake/Output Summary (Last 24 hours) at 1/2/2021 1248  Last data filed at 1/1/2021 2121  Gross per 24 hour   Intake 240 ml   Output 275 ml   Net -35 ml      Wt Readings from Last 3 Encounters:   12/29/20 184 lb (83.5 kg)   12/21/20 200 lb (90.7 kg)   11/02/20 205 lb (93 kg)       General appearance:  Appears comfortable  Eyes: Sclera clear. Pupils equal.  ENT: Moist oral mucosa. Trachea midline, no adenopathy.   Cardiovascular: Regular rhythm, normal S1, S2. No murmur. No edema in lower extremities  Respiratory: Not using accessory muscles. Good inspiratory effort. Clear to auscultation bilaterally, no wheeze or crackles. GI: Abdomen soft, no tenderness, not distended, normal bowel sounds  Musculoskeletal: No cyanosis in digits, neck supple  Neurology: CN 2-12 grossly intact. No speech or motor deficits  Psych: Normal affect. Alert and oriented in time, place and person  Skin: Warm, dry, normal turgor    Labs and Tests:  CBC:   Recent Labs     12/31/20  0600 01/01/21  1120 01/02/21  0900   WBC 5.1 5.0 2.4*   HGB 8.4* 8.9* 8.2*    179 146     BMP:    Recent Labs     12/31/20  1646 01/01/21  1120 01/02/21  0900   * 134* 135*   K 2.5* 3.3* 3.5    105 108   CO2 22 22 21   BUN 8 9 8   CREATININE 1.2 1.2 0.9   GLUCOSE 103* 132* 226*     Hepatic:   No results for input(s): AST, ALT, ALB, BILITOT, ALKPHOS in the last 72 hours. CT HEAD WO CONTRAST   Final Result   No acute intracranial abnormality.           XR FOOT RIGHT (MIN 3 VIEWS)   Final Result   Slight soft tissue swelling question in the forefoot. No acute bony   abnormality. Bones are osteoporotic.           XR ANKLE RIGHT (MIN 3 VIEWS)   Final Result   Minimally displaced fracture of the lateral malleolus.       Possible very small coexistent fracture the periarticular anterior tibia,   versus artifact from overlying shadows. CT abd/pelvis 12/21  1. No evidence of acute GI bleed. 2. Prior history of Whipple procedure with no abnormalities of the GI tract   noted otherwise. 3. Stable necrotic lesion in the retroperitoneum anterior to the aorta   suspected to represent a necrotic lymph node.  Correlate with prior oncology   and surgical history.    4. Stable hepatic cyst in the caudate lobe.  A subtle area of low attenuation   in the right hepatic lobe cannot be characterized by CT but does appear   similar to prior exam.  MRI may be considered for further evaluation if clinically indicated. 5. Bilateral nonobstructing nephrolithiasis. Problem List  Active Problems:    Diarrhea    Pancytopenia (HCC)    Malleolar fracture, right, closed, initial encounter    Hypokalemia    Atrial fibrillation (HCC)    Multifocal atrial tachycardia (HCC)    Hyponatremia  Resolved Problems:    * No resolved hospital problems. *       Assessment & Plan:   1. Toxic enterocolitis-  Secondary to chemotherapy. Stool studies unremarkable. Continue octreotide. Decrease scheduled Lomotil to twice a day. He had flex sig last week which just revealed fissures. We will stop IV fluids for now. 2. Metabolic acidosis-resolved. Off bicarb. 3. severe hypokalemia-improving. Continue scheduled potassium twice daily. He was on Lasix at home for some reason. This was stopped yesterday. Would not resume this. 4. Hypomagnesemia-resolved after replacement  5. Severe oral candidiasis/mucositis-continue IV diflucan, continue magic mouthwash with nystatin. This looks much better today. 6. afib versus MAT-P on board. On p.o. Cardizem and digoxin. No need for anticoagulation. 7. R malleolar fracture-non surgical. Ortho on board. PT/OT to assess. 8. Dm 2-patient is having some hyperglycemia however had add dextrose to his fluids yesterday. Will DC the dextrose fluids and continue Lantus at 15 plus sliding scale. 9. Syncope, recurrent falls-likely orthostatic hypotension from profuse diarrhea. 10. Disposition-hopefully dc home in the next 24-48 hours.        Diet: Dietary Nutrition Supplements: Diabetic Oral Supplement  DIET CARB CONTROL;  Code:Full Code  DVT PPX: jhonatan Nance PA-C   1/2/2021 12:48 PM

## 2021-01-02 NOTE — PROGRESS NOTES
Assisted to the restroom where he voided and had a BM and then assisted back to bed to position of comfort. Pt is alert and oriented and c/o pain in his buttocks d/t multiple BM's. Assessment done, VSS, call light in reach and bed alarm on, will continue to monitor.

## 2021-01-02 NOTE — PROGRESS NOTES
Physical Therapy  Facility/Department: [address] ORTHO/NEURO NURSING  Daily Treatment Note  NAME: Navdeep Grullon  : 1960  MRN: [id number]    Date of Service: 2021    Discharge Recommendations:  Navdeep Grullon scored a 16/24 on the AM-PAC short mobility form. Current research shows that an AM-PAC score of 17 or less is typically not associated with a discharge to the patient's home setting. Based on the patient's AM-PAC score and their current functional mobility deficits, it is recommended that the patient have 3-5 sessions per week of Physical Therapy at d/c to increase the patient's independence. Please see assessment section for further patient specific details. If patient discharges prior to next session this note will serve as a discharge summary. Please see below for the latest assessment towards goals. PT Equipment Recommendations  Equipment Needed: No  Other: Pt has RW at home    Assessment   Body structures, Functions, Activity limitations: Decreased functional mobility ; Decreased ADL status; Decreased ROM; Decreased strength;Decreased endurance;Decreased balance; Increased pain  Assessment: pt presents with a significant decline in function this session requiring min assist for all safe mobility and quickly fatigued for all standing activities and ambulation. pt will benefit from continued skilled therapy services to facilitate return to PLOF and reduce fall risk. Treatment Diagnosis: impaired functional mobility  Prognosis: Good  PT Education: Goals;PT Role;Plan of Care;Gait Training;Transfer Training;Functional Mobility Training  Patient Education: pt verbalized understanding  REQUIRES PT FOLLOW UP: Yes  Activity Tolerance  Activity Tolerance: Patient limited by fatigue     Patient Diagnosis(es): The primary encounter diagnosis was Hypokalemia.  Diagnoses of Generalized weakness, Diarrhea, unspecified type, and Closed nondisplaced fracture of lateral malleolus of right fibula, initial encounter were also pertinent to this visit. has a past medical history of Allergic rhinitis, Cancer ([facility] [address] Gout, Hypertension, Prolonged emergence from general anesthesia, and Type II or unspecified type diabetes mellitus without mention of complication, not stated as uncontrolled. has a past surgical history that includes Knee arthroscopy (Left); Shoulder Arthroplasty (Right, 2012); Upper gastrointestinal endoscopy (02/21/2018); other surgical history (02/26/2018); Pancreas surgery; Cystocopy (06/19/2018); Endoscopy, colon, diagnostic; Port Surgery (Right, 11/2/2020); Port Surgery (Left, 11/2/2020); and sigmoidoscopy (N/A, 12/22/2020). Restrictions  Restrictions/Precautions  Restrictions/Precautions: Weight Bearing, Fall Risk  Required Braces or Orthoses?: Yes  Lower Extremity Weight Bearing Restrictions  Right Lower Extremity Weight Bearing: Weight Bearing As Tolerated  Required Braces or Orthoses  Right Lower Extremity Brace: Boot  RLE Brace Type: Tall walking boot  Position Activity Restriction  Other position/activity restrictions: Laura Pyle is a 63 y.o. male who presents to the emergency department today for evaluation for general fatigue, nausea, and diarrhea. The patient has a history of pancreatic cancer, is followed by Dr. Kumar Paige, oncology. The patient states that for the past few days he has had increasing fatigue, and multiple episodes of diarrhea. There has been no blood in the stool or black tarry appearance of the stool. He was recently admitted for GI bleed, colonoscopy at that time did show anal fissures and hemorrhoids. The patient was sent home from the hospital last Tuesday. He has not really been eating or drinking much since being home. The family states that the patient had 3 falls 6 days ago, and family is concerned about the bruising to the ankle as well as to the foot.   The patient did fall and hit his head 1 time next days ago, but there is no loss of consciousness. No vomiting. The patient has been acting normally since. Patient has no headaches the patient was supposed to have his therapy today, however after seeing his oncologist, having basic labs drawn, he was told to come to the emergency room because his potassium was low. The patient is complaining of generalized abdominal pain which he is rating is a 6/10. States that this is due to his normal pain that he has. He is not had any vomiting. He has no chest pain or shortness of breath. He has no fever chills. He has no cough or congestion. He has no urinary symptoms. Subjective   General  Chart Reviewed: Yes  Response To Previous Treatment: Patient with no complaints from previous session.   Family / Caregiver Present: No  Subjective  Subjective: pt supine at start of session, reports being tired but agreeable to therapy  Pain Screening  Patient Currently in Pain: Yes  Pain Assessment  Pain Assessment: 0-10  Pain Level: 4  Pain Type: Acute pain  Pain Location: Buttocks  Vital Signs  Patient Currently in Pain: Yes       Orientation  Orientation  Overall Orientation Status: Within Functional Limits  Cognition      Objective   Bed mobility  Supine to Sit: Minimal assistance  Sit to Supine: Unable to assess(pt sitting up at end of session)  Scooting: Stand by assistance  Transfers  Sit to Stand: Contact guard assistance;Minimal Assistance  Stand to sit: Minimal Assistance  Comment: CGA from bed, min assist from toilet  Ambulation  Ambulation?: Yes  WB Status: WBAT with boot RLE  Ambulation 1  Surface: level tile  Device: Rolling Walker  Assistance: Minimal assistance  Quality of Gait: flexed trunk (refusing height adjustment on RW), wide AALIYAH, difficulty clearing RLE even with wearing gym shoe on LLE  Distance: 10' x 1, 15' x 1--distance limited by fatigue, increased effort needed to complete short distances this session  Stairs/Curb  Stairs?: No(safety concerns)     Balance  Posture: Fair  Sitting - Static: Good  Sitting - Dynamic: Fair;+  Standing - Static: Fair;+  Standing - Dynamic: Poor  Comments: EOB balance to don boot, SBA for balance and mod assist needed to correctly don boot. standing balance at sink with flexed trunk/leaning on sink for UE support and CGA            AM-PAC Score  AM-PAC Inpatient Mobility Raw Score : 16 (01/02/21 1344)  AM-PAC Inpatient T-Scale Score : 40.78 (01/02/21 1344)  Mobility Inpatient CMS 0-100% Score: 54.16 (01/02/21 1344)  Mobility Inpatient CMS G-Code Modifier : CK (01/02/21 1344)          Goals  Short term goals  Time Frame for Short term goals: To be met prior to discharge  Short term goal 1: Pt will complete bed mobility with mod I. Short term goal 2: Pt will complete sit to/from stand with mod I. Short term goal 3: Pt will ambulate 50 ft with LRAD and mod I. Short term goal 4: Pt will navigate up/down 2 steps with HR and supervision. Long term goals  Time Frame for Long term goals : LTG=STG  No goals met  Plan    Plan  Times per week: 7x  Times per day: Daily  Current Treatment Recommendations: Strengthening, ROM, Balance Training, Functional Mobility Training, Transfer Training, Endurance Training, Gait Training, Stair training, Safety Education & Training, Patient/Caregiver Education & Training, Equipment Evaluation, Education, & procurement, Pain Management, Modalities, Positioning, Home Exercise Program, Manual Therapy - Soft Tissue Mobilization  Safety Devices  Type of devices:  All fall risk precautions in place, Call light within reach, Chair alarm in place, Nurse notified, Left in chair  Restraints  Initially in place: No     Therapy Time   Individual Concurrent Group Co-treatment   Time In 1302         Time Out 1334         Minutes 32         Timed Code Treatment Minutes: [address] [facility], [address] [place] [address] Will continue Lantus 60u at bedtime.  Will decrease Humalog to 23u before each meal and continue Humalog correction scale coverage. Will continue monitoring FS and FU.  Patient was on insulin at home, knows how to do insulin pen injections. Will continue monitoring and make recommendations prior to DC.  Patient and wife counseled not to bring food from home, discussed compliance with consistent low carb diet and exercise as tolerated outpatient.

## 2021-01-02 NOTE — PROGRESS NOTES
Occupational Therapy  Facility/Department: [address] ORTHO/NEURO NURSING  Daily Treatment Note  NAME: Cyn French  : 1960  MRN: [id number]    Date of Service: 2021    Discharge Recommendations:  Cyn French scored a 17/24 on the AM-PAC ADL Inpatient form. Current research shows that an AM-PAC score of 17 or less is typically not associated with a discharge to the patient's home setting. Based on the patient's AM-PAC score and their current ADL deficits, it is recommended that the patient have 3-5 sessions per week of Occupational Therapy at d/c to increase the patient's independence. Please see assessment section for further patient specific details. If patient discharges prior to next session this note will serve as a discharge summary. Please see below for the latest assessment towards goals. If pt refuses: pt has decreased endurance for ax and lives home alone with some family support from daughters. PT has had multiple falls in the past few days prior to admission     HOME HEALTH CARE: LEVEL 3 SAFETY     - Initial home health evaluation to occur within 24-48 hours, in patient home   - Therapy evaluations in home within 24-48 hours of discharge; including DME and home safety   - Frontload therapy 5 days, then 3x a week   - Therapy to evaluate if patient has [address] needs for personal care   -  evaluation within 24-48 hours, includes evaluation of resources and insurance to determine [place], [place], [place]TC, and Medicaid options        OT Equipment Recommendations  Equipment Needed: Yes  ADL Assistive Devices: Shower Chair with back; Toileting - 3-in-1 Commode    Assessment   Performance deficits / Impairments: Decreased functional mobility ; Decreased endurance;Decreased ADL status; Decreased high-level IADLs  Assessment: pt presents with decreased functional mobility, balance and endurance for ax. pt has had multiple falls. fearful of falling.  pt would benefit from ongoing skilled OT services in order to return to PLOF  Treatment Diagnosis: Decreaed ADL/IADL status, endurance and functional mobility associated with R closed malleolar fx  Prognosis: Good  OT Education: OT Role;Plan of Care;Transfer Training;Equipment  Patient Education: Purpose of OT lolly, d/c recommendation, DME. Pt independently verbalized and demonstrated understanding. Barriers to Learning: None  REQUIRES OT FOLLOW UP: Yes  Activity Tolerance  Activity Tolerance: Patient limited by fatigue  Safety Devices  Safety Devices in place: Yes  Type of devices: Nurse notified;Call light within reach; Left in chair;Patient at risk for falls; All fall risk precautions in place; Chair alarm in place  Restraints  Initially in place: No         Patient Diagnosis(es): The primary encounter diagnosis was Hypokalemia. Diagnoses of Generalized weakness, Diarrhea, unspecified type, and Closed nondisplaced fracture of lateral malleolus of right fibula, initial encounter were also pertinent to this visit. has a past medical history of Allergic rhinitis, Cancer ([facility] [address] Gout, Hypertension, Prolonged emergence from general anesthesia, and Type II or unspecified type diabetes mellitus without mention of complication, not stated as uncontrolled. has a past surgical history that includes Knee arthroscopy (Left); Shoulder Arthroplasty (Right, 2012); Upper gastrointestinal endoscopy (02/21/2018); other surgical history (02/26/2018); Pancreas surgery; Cystocopy (06/19/2018); Endoscopy, colon, diagnostic; Port Surgery (Right, 11/2/2020); Port Surgery (Left, 11/2/2020); and sigmoidoscopy (N/A, 12/22/2020).     Restrictions  Restrictions/Precautions  Restrictions/Precautions: Weight Bearing, Fall Risk  Required Braces or Orthoses?: Yes  Lower Extremity Weight Bearing Restrictions  Right Lower Extremity Weight Bearing: Weight Bearing As Tolerated  Required Braces or Orthoses  Right Lower Extremity Brace: Boot  RLE Brace Type: Tall walking boot  Position Activity Restriction  Other position/activity restrictions: Harper Payne is a 63 y.o. male who presents to the emergency department today for evaluation for general fatigue, nausea, and diarrhea. The patient has a history of pancreatic cancer, is followed by Dr. Pasha Rodriguez, oncology. The patient states that for the past few days he has had increasing fatigue, and multiple episodes of diarrhea. There has been no blood in the stool or black tarry appearance of the stool. He was recently admitted for GI bleed, colonoscopy at that time did show anal fissures and hemorrhoids. The patient was sent home from the hospital last Tuesday. He has not really been eating or drinking much since being home. The family states that the patient had 3 falls 6 days ago, and family is concerned about the bruising to the ankle as well as to the foot. The patient did fall and hit his head 1 time next days ago, but there is no loss of consciousness. No vomiting. The patient has been acting normally since. Patient has no headaches the patient was supposed to have his therapy today, however after seeing his oncologist, having basic labs drawn, he was told to come to the emergency room because his potassium was low. The patient is complaining of generalized abdominal pain which he is rating is a 6/10. States that this is due to his normal pain that he has. He is not had any vomiting. He has no chest pain or shortness of breath. He has no fever chills. He has no cough or congestion. He has no urinary symptoms. Subjective   General  Chart Reviewed:  Yes  Additional Pertinent Hx: Pancreatic Ca, chemotherapy  Response to previous treatment: Patient with no complaints from previous session  Family / Caregiver Present: No  Referring Practitioner: Richmond Morales MD, for d/c planning  Diagnosis: R closed malleolar fx  Subjective  Subjective: pt supine upon arrival and agreeable to OT session, reporting 4/10 Score        AM-[facility] Inpatient Daily Activity Raw Score: 17 (01/02/21 1442)  AM-PAC Inpatient ADL T-Scale Score : 37.26 (01/02/21 1442)  ADL Inpatient CMS 0-100% Score: 50.11 (01/02/21 1442)  ADL Inpatient CMS G-Code Modifier : CK (01/02/21 1442)    Goals  Short term goals  Time Frame for Short term goals: Discharge  Short term goal 1: Mod I for functional transfers to ADL surfaces w/RW--ongoing 1/4  Short term goal 2: Mod I for functional mobility for ADL activity w/RW---ongoing 1/4  Short term goal 3: Mod I for UB bathing/dressing---ongoing 1/2  Short term goal 4: Mod I for LB bathing/dressing---ongoing 1/2  Short term goal 5: pt to tolerate standing 5 min for ADL activity---ongoing 1/2       Therapy Time   Individual Concurrent Group Co-treatment   Time In       1303   Time Out       1334   Minutes       32     Timed Code Treatment Minutes:  32 Minutes    Total Treatment Minutes:  32 minutes      Brittny Breen OTR/L [id number]      Brittny Breen OT

## 2021-01-03 NOTE — PROGRESS NOTES
Occupational Therapy  Facility/Department: [address] ORTHO/NEURO NURSING  Daily Treatment Note  NAME: Harper Payne  : 1960  MRN: [id number]    Date of Service: 1/3/2021    Discharge Recommendations:  Harper Payne scored a 17/24 on the AM-PAC ADL Inpatient form. Current research shows that an AM-PAC score of 17 or less is typically not associated with a discharge to the patient's home setting. Based on the patient's AM-PAC score and their current ADL deficits, it is recommended that the patient have 3-5 sessions per week of Occupational Therapy at d/c to increase the patient's independence. Please see assessment section for further patient specific details. If patient discharges prior to next session this note will serve as a discharge summary. Please see below for the latest assessment towards goals. Pt to likely refuse:  HOME HEALTH CARE: LEVEL 3 SAFETY     - Initial home health evaluation to occur within 24-48 hours, in patient home   - Therapy evaluations in home within 24-48 hours of discharge; including DME and home safety   - Frontload therapy 5 days, then 3x a week   - Therapy to evaluate if patient has [address] needs for personal care   -  evaluation within 24-48 hours, includes evaluation of resources and insurance to determine [place], [place], LTC, and Medicaid options        OT Equipment Recommendations  ADL Assistive Devices: Shower Chair with back; Toileting - 3-in-1 Commode;Emergency Alert System    Assessment   Performance deficits / Impairments: Decreased functional mobility ; Decreased endurance;Decreased ADL status; Decreased high-level IADLs  Assessment: pt refusing to wear tall walking boot for mobility to/from bathroom despite education.  pt presents with decreased endurance and is not at baseline level of functioning and would benefit from ongoing skilled OT services in order to return to PLOF  Treatment Diagnosis: Decreaed ADL/IADL status, endurance and functional mobility associated with R closed malleolar fx  Prognosis: Good  History: Pt 64 yo, lives alone with assist of daughters, stays on main level, independent ADLs/IADLs, ambulation. 4 recent falls. PMH: Pancreatic Ca, HTN, DM2, gout, whipple & jose ramon 2/18, R rot cuff repair, R portacath, cehmo  Assistance / Modification: assist of 1  OT Education: OT Role;Plan of Care;Transfer Training;Equipment  Patient Education: Purpose of OT eval, d/c recommendation, DME. Pt independently verbalized and demonstrated understanding. Barriers to Learning: None  REQUIRES OT FOLLOW UP: Yes  Activity Tolerance  Activity Tolerance: Patient limited by fatigue  Activity Tolerance: pt refusing to wear tall walking boot despite education  Safety Devices  Safety Devices in place: Yes  Type of devices: Nurse notified;Call light within reach; Patient at risk for falls; All fall risk precautions in place; Left in bed;Bed alarm in place  Restraints  Initially in place: No         Patient Diagnosis(es): The primary encounter diagnosis was Hypokalemia. Diagnoses of Generalized weakness, Diarrhea, unspecified type, and Closed nondisplaced fracture of lateral malleolus of right fibula, initial encounter were also pertinent to this visit. has a past medical history of Allergic rhinitis, Cancer ([facility] [address] Gout, Hypertension, Prolonged emergence from general anesthesia, and Type II or unspecified type diabetes mellitus without mention of complication, not stated as uncontrolled. has a past surgical history that includes Knee arthroscopy (Left); Shoulder Arthroplasty (Right, 2012); Upper gastrointestinal endoscopy (02/21/2018); other surgical history (02/26/2018); Pancreas surgery; Cystocopy (06/19/2018); Endoscopy, colon, diagnostic; Port Surgery (Right, 11/2/2020); Port Surgery (Left, 11/2/2020); and sigmoidoscopy (N/A, 12/22/2020).     Restrictions  Restrictions/Precautions  Restrictions/Precautions: Weight Bearing, Fall Risk  Required Braces or Orthoses?: Yes  Lower Extremity Weight Bearing Restrictions  Right Lower Extremity Weight Bearing: Weight Bearing As Tolerated  Required Braces or Orthoses  Right Lower Extremity Brace: Boot  RLE Brace Type: Tall walking boot  Position Activity Restriction  Other position/activity restrictions: Navdeep Grullon is a 63 y.o. male who presents to the emergency department today for evaluation for general fatigue, nausea, and diarrhea. The patient has a history of pancreatic cancer, is followed by Dr. Karen Erazo, oncology. The patient states that for the past few days he has had increasing fatigue, and multiple episodes of diarrhea. There has been no blood in the stool or black tarry appearance of the stool. He was recently admitted for GI bleed, colonoscopy at that time did show anal fissures and hemorrhoids. The patient was sent home from the hospital last Tuesday. He has not really been eating or drinking much since being home. The family states that the patient had 3 falls 6 days ago, and family is concerned about the bruising to the ankle as well as to the foot. The patient did fall and hit his head 1 time next days ago, but there is no loss of consciousness. No vomiting. The patient has been acting normally since. Patient has no headaches the patient was supposed to have his therapy today, however after seeing his oncologist, having basic labs drawn, he was told to come to the emergency room because his potassium was low. The patient is complaining of generalized abdominal pain which he is rating is a 6/10. States that this is due to his normal pain that he has. He is not had any vomiting. He has no chest pain or shortness of breath. He has no fever chills. He has no cough or congestion. He has no urinary symptoms. Subjective   General  Chart Reviewed:  Yes  Additional Pertinent Hx: Pancreatic Ca, chemotherapy  Response to previous treatment: Patient with no complaints from previous session  Family / Caregiver Present: No  Referring Practitioner: Kalyn Heart MD, for d/c planning  Diagnosis: R closed malleolar fx  Subjective  Subjective: pt in bathroom upon arrival and agreeable to OT session, pt denies pain. tall walking boot not on pt. attempted to get boot. pt reports \"I do not want to wear it here. I will wear it at home. \" pt educated on wear for healing of fx  Vital Signs  Patient Currently in Pain: Denies   Orientation  Orientation  Overall Orientation Status: Within Functional Limits  Objective    ADL  Grooming: Contact guard assistance(stance at sink for hand washing)  LE Dressing: Minimal assistance  Toileting: Minimal assistance(standing for angelique care)  Additional Comments: pt found in bathroom, loose stools.  assist for functional mobility back to bed, refused further ADLs        Balance  Sitting Balance: Supervision  Standing Balance: Contact guard assistance  Standing Balance  Time: ~4 minutes  Activity: mobility to/from bathroom with RW, decreased endurance for ax. refusing to wear tall walking boot despite education  Comment: rw used  Functional Mobility  Functional - Mobility Device: Rolling Walker  Activity: (from bathroom)  Toilet Transfers  Toilet - Technique: Ambulating  Equipment Used: Standard toilet  Toilet Transfer: Minimal assistance  Toilet Transfers Comments: grab bar used  Bed mobility  Sit to Supine: Stand by assistance  Scooting: Stand by assistance  Comment: pt physically assisting R LE into bed  Transfers  Sit to stand: Minimal assistance  Stand to sit: Contact guard assistance  Transfer Comments: Rowena transfer from toilet                       Cognition  Overall Cognitive Status: [facility]  Safety Judgement: Decreased awareness of need for safety     Perception  Overall Perceptual Status: [facility]                                   Plan   Plan  Times per week: 7  Times per day: Daily  Current Treatment Recommendations: Self-Care / ADL, Endurance Training, Functional Mobility Training, Safety Education & Training  G-Code     OutComes Score                                                  AM-PAC Score        AM-PAC Inpatient Daily Activity Raw Score: 17 (01/03/21 0850)  AM-PAC Inpatient ADL T-Scale Score : 37.26 (01/03/21 0850)  ADL Inpatient CMS 0-100% Score: 50.11 (01/03/21 0850)  ADL Inpatient CMS G-Code Modifier : CK (01/03/21 0850)    Goals  Short term goals  Time Frame for Short term goals: Discharge  Short term goal 1: Mod I for functional transfers to ADL surfaces w/RW--ongoing 1/3  Short term goal 2: Mod I for functional mobility for ADL activity w/RW---ongoing 1/3  Short term goal 3: Mod I for UB bathing/dressing---ongoing 1/3  Short term goal 4: Mod I for LB bathing/dressing---ongoing 1/3  Short term goal 5: pt to tolerate standing 5 min for ADL activity---ongoing 1/3       Therapy Time   Individual Concurrent Group Co-treatment   Time In 0835         Time Out 0849         Minutes 14           Timed Code Treatment Minutes:  14 Minutes    Total Treatment Minutes:  14 minutes      Mami Skaggs OTR/L [id number]      Mami Skaggs OT

## 2021-01-03 NOTE — PROGRESS NOTES
Shift assessment complete see flowsheets, meds per orders see eMAR. Tylenol given for mild aches and pains. Patient alert and oriented x4, multiple loose stools overnight. Offered snacks and drinks at bedtime. Patient resting in bed with eyes closed at this time. The care plan and education has been reviewed and mutually agreed upon with the patient. Patient remains free from falls. All fall precautions in place. Yellow blanket at bedside, yellow bracelet on patient. SAFE sign on door. Bed and chair alarms being used. Bed in lowest position. Will monitor.      Electronically signed by Ab Bautista RN on 1/3/2021 at 2:35 AM

## 2021-01-03 NOTE — PROGRESS NOTES
Physical Therapy  Facility/Department: [address] ORTHO/NEURO NURSING  Daily Treatment Note  NAME: Jennifer Zavala  : 1960  MRN: [id number]    Date of Service: 1/3/2021    Discharge Recommendations:  Jennifer Zavala scored a 16/24 on the AM-PAC short mobility form. Current research shows that an AM-PAC score of 17 or less is typically not associated with a discharge to the patient's home setting. Based on the patient's AM-PAC score and their current functional mobility deficits, it is recommended that the patient have 3-5 sessions per week of Physical Therapy at d/c to increase the patient's independence. Please see assessment section for further patient specific details. Patient currently refusing non-home discharge. If continues to refuse recommend home with 24 hour supervision and S3 level home care. HOME HEALTH CARE: LEVEL 3 SAFETY     - Initial home health evaluation to occur within 24-48 hours, in patient home   - Therapy evaluations in home within 24-48 hours of discharge; including DME and home safety   - Frontload therapy 5 days, then 3x a week   - Therapy to evaluate if patient has [address] needs for personal care   -  evaluation within 24-48 hours, includes evaluation of resources and insurance to determine [place], [place], LTC, and Medicaid options     If patient discharges prior to next session this note will serve as a discharge summary. Please see below for the latest assessment towards goals. 3-5 sessions per week   PT Equipment Recommendations  Equipment Needed: No  Other: Pt has RW at home    Assessment   Body structures, Functions, Activity limitations: Decreased functional mobility ; Decreased ADL status; Decreased ROM; Decreased strength;Decreased endurance;Decreased balance; Increased pain  Assessment: Patient continues to need min A for transfers, but able to safely ambulate with use of RW and SBA.  Gloriay refusing use of tall walking boot, refusing non-home discharge and refusing home care. States his children will take turns spending the night with him. Treatment Diagnosis: impaired functional mobility  PT Education: Goals;PT Role;Plan of Care;Gait Training;Transfer Training;Functional Mobility Training  Patient Education: pt verbalized understanding, verbally education on stair navigation and safe car transfers as well as purpose of tall walking boot 1/3  Barriers to Learning: emotional  Activity Tolerance  Activity Tolerance: Patient limited by fatigue  Activity Tolerance: tolerates minimal activity prior to fatigue     Patient Diagnosis(es): The primary encounter diagnosis was Hypokalemia. Diagnoses of Generalized weakness, Diarrhea, unspecified type, and Closed nondisplaced fracture of lateral malleolus of right fibula, initial encounter were also pertinent to this visit. has a past medical history of Allergic rhinitis, Cancer ([facility] [address] Gout, Hypertension, Prolonged emergence from general anesthesia, and Type II or unspecified type diabetes mellitus without mention of complication, not stated as uncontrolled. has a past surgical history that includes Knee arthroscopy (Left); Shoulder Arthroplasty (Right, 2012); Upper gastrointestinal endoscopy (02/21/2018); other surgical history (02/26/2018); Pancreas surgery; Cystocopy (06/19/2018); Endoscopy, colon, diagnostic; Port Surgery (Right, 11/2/2020); Port Surgery (Left, 11/2/2020); and sigmoidoscopy (N/A, 12/22/2020).     Restrictions  Restrictions/Precautions  Restrictions/Precautions: Weight Bearing, Fall Risk  Required Braces or Orthoses?: Yes  Lower Extremity Weight Bearing Restrictions  Right Lower Extremity Weight Bearing: Weight Bearing As Tolerated  Required Braces or Orthoses  Right Lower Extremity Brace: Boot  RLE Brace Type: Tall walking boot  Position Activity Restriction  Other position/activity restrictions: Misael Sharp is a 63 y.o. male who presents to the emergency department today for evaluation for general fatigue, nausea, and diarrhea. The patient has a history of pancreatic cancer, is followed by Dr. Florence Kelly, oncology. The patient states that for the past few days he has had increasing fatigue, and multiple episodes of diarrhea. There has been no blood in the stool or black tarry appearance of the stool. He was recently admitted for GI bleed, colonoscopy at that time did show anal fissures and hemorrhoids. The patient was sent home from the hospital last Tuesday. He has not really been eating or drinking much since being home. The family states that the patient had 3 falls 6 days ago, and family is concerned about the bruising to the ankle as well as to the foot. The patient did fall and hit his head 1 time next days ago, but there is no loss of consciousness. Sustained Right lateral malleolus fracture, Seen by ortho surgery and recommended Closed treatment of fracture with tall walking boot and use of walker  Subjective   General  Chart Reviewed: Yes  Family / Caregiver Present: No  Subjective  Subjective: Denied pain. Refusing to wear tall walking boot. General Comment  Comments:  In bathroom with nursing upon arrival. Tall walking boot not on - patient refusing despite education          Orientation  Orientation  Overall Orientation Status: Within Functional Limits  Cognition      Objective   Bed mobility  Sit to Supine: Stand by assistance  Scooting: Stand by assistance  Transfers  Sit to Stand: Minimal Assistance(from toilet)  Stand to sit: Contact guard assistance  Ambulation 1  Surface: level tile  Device: Rolling Walker  Quality of Gait: decreased step length and panda, forward flexed posture  Distance: 15'  Comments: declined further ambulation due to fatigue  Stairs/Curb  Stairs?: No(declined due to fatigue - education provided on technique)     Balance  Sitting - Static: Good  Sitting - Dynamic: Good  Standing - Static: Fair  Standing - Dynamic: Fair  Comments: CGA to perform clothing management and angelique-care in stance with 1 UE support on walker               AM-PAC Score  AM-PAC Inpatient Mobility Raw Score : 16 (01/03/21 1422)  AM-PAC Inpatient T-Scale Score : 40.78 (01/03/21 1422)  Mobility Inpatient CMS 0-100% Score: 54.16 (01/03/21 1422)  Mobility Inpatient CMS G-Code Modifier : CK (01/03/21 1422)          Goals  Short term goals  Time Frame for Short term goals: To be met prior to discharge  Short term goal 1: Pt will complete bed mobility with mod I. Short term goal 2: Pt will complete sit to/from stand with mod I. Short term goal 3: Pt will ambulate 50 ft with LRAD and mod I. Short term goal 4: Pt will navigate up/down 2 steps with HR and supervision. Long term goals  Time Frame for Long term goals : LTG=STG  No goals met this treatment. Plan    Plan  Times per week: 7x  Times per day: Daily  Current Treatment Recommendations: Strengthening, ROM, Balance Training, Functional Mobility Training, Transfer Training, Endurance Training, Gait Training, Stair training, Safety Education & Training, Patient/Caregiver Education & Training, Home Exercise Program  Safety Devices  Type of devices:  All fall risk precautions in place, Bed alarm in place, Call light within reach, Left in bed, Patient at risk for falls, Nurse notified  Restraints  Initially in place: No     Therapy Time   Individual Concurrent Group Co-treatment   Time In 1258         Time Out 1323         Minutes 25         Timed Code Treatment Minutes: 24 Emmanuel Villa, PT    Jean Freitas, PT, DPT [id number]

## 2021-01-03 NOTE — PROGRESS NOTES
[facility] PROGRESS NOTE    1/3/2021 2:51 PM        Name: Sathya Salinas Admitted: 12/29/2020  Primary Care Provider: Santi Augustin MD (Tel: [phone])      Subjective:  . Patient admitted with severe hypokalemia secondary to toxic enterocolitis as a result of chemotherapy. He also had severe mucositis with some oral candidiasis. This has improved significantly. Having loose stools every 3 hours which he seems ok with. He does not want to get constipated. If offered increasing lomotil to scheduled tid-he declined. He lives alone however his daughter has been staying with him the past few days as he has fallen 3 times. Seems to correlate with getting up and moving around or slipping he has had profuse diarrhea for several weeks now. He is going to the bathroom every hour. He denies abdominal pain. He states that he cannot eat a lot as it just goes right through him. He denies chest pain he started having some dysphagia and difficulties and painful swallowing yesterday. He was here a week ago with some rectal bleeding and had a flex sig revealing some anal fissures.     Reviewed interval ancillary notes    Current Medications      potassium chloride (KLOR-CON M) extended release tablet 40 mEq, BID WC      benzocaine-menthol (CEPACOL SORE THROAT) lozenge 1 lozenge, Q2H PRN      diphenoxylate-atropine (LOMOTIL) 2.5-0.025 MG per tablet 1 tablet, 4x Daily      insulin glargine (LANTUS;BASAGLAR) injection pen 15 Units, QAM      potassium chloride (KLOR-CON M) extended release tablet 40 mEq, PRN    Or      potassium bicarb-citric acid (EFFER-K) effervescent tablet 40 mEq, PRN    Or      potassium chloride 10 mEq/100 mL IVPB (Peripheral Line), PRN      digoxin (LANOXIN) tablet 125 mcg, Daily      dilTIAZem (CARDIZEM 12 HR) extended release capsule 60 mg, BID      magic (miracle) mouthwash with nystatin, 4x Daily PRN      perflutren lipid microspheres (DEFINITY) injection 1.65 mg, ONCE PRN      fluconazole (DIFLUCAN) in 0.9 % sodium chloride IVPB 200 mg, Q24H      octreotide (SANDOSTATIN) injection 100 mcg, TID PRN      HYDROmorphone HCl PF (DILAUDID) injection 0.5 mg, Q30 Min PRN      HYDROmorphone HCl PF (DILAUDID) injection 0.5 mg, Q4H PRN      HYDROcodone-acetaminophen (NORCO) 5-325 MG per tablet 1 tablet, Q4H PRN      tamsulosin (FLOMAX) capsule 0.4 mg, BID      glucose (GLUTOSE) 40 % oral gel 15 g, PRN      dextrose 50 % IV solution, PRN      glucagon (rDNA) injection 1 mg, PRN      dextrose 5 % solution, PRN      sodium chloride flush 0.9 % injection 10 mL, 2 times per day      sodium chloride flush 0.9 % injection 10 mL, PRN      enoxaparin (LOVENOX) injection 40 mg, Daily      promethazine (PHENERGAN) tablet 12.5 mg, Q6H PRN    Or      ondansetron (ZOFRAN) injection 4 mg, Q6H PRN      polyethylene glycol (GLYCOLAX) packet 17 g, Daily PRN      acetaminophen (TYLENOL) tablet 650 mg, Q6H PRN    Or      acetaminophen (TYLENOL) suppository 650 mg, Q6H PRN      insulin lispro (1 Unit Dial) 0-12 Units, TID WC      insulin lispro (1 Unit Dial) 0-6 Units, Nightly      aspirin chewable tablet 81 mg, Daily        Objective:  /85   Pulse 95   Temp 97.8 °F (36.6 °C) (Oral)   Resp 16   Ht 5' 8\" (1.727 m)   Wt 184 lb (83.5 kg)   SpO2 95%   BMI 27.98 kg/m²     Intake/Output Summary (Last 24 hours) at 1/3/2021 1451  Last data filed at 1/2/2021 2116  Gross per 24 hour   Intake 240 ml   Output --   Net 240 ml      Wt Readings from Last 3 Encounters:   12/29/20 184 lb (83.5 kg)   12/21/20 200 lb (90.7 kg)   11/02/20 205 lb (93 kg)       General appearance:  Appears comfortable  Eyes: Sclera clear. Pupils equal.  ENT: Moist oral mucosa. Trachea midline, no adenopathy. Cardiovascular: Regular rhythm, normal S1, S2. No murmur.  No edema in lower extremities  Respiratory: Problem List  Active Problems:    Diarrhea    Pancytopenia (HCC)    Malleolar fracture, right, closed, initial encounter    Hypokalemia    Atrial fibrillation (HCC)    Multifocal atrial tachycardia (HCC)    Hyponatremia  Resolved Problems:    * No resolved hospital problems. *       Assessment & Plan:   1. Toxic enterocolitis-  Secondary to chemotherapy. Stool studies unremarkable. Continue octreotide. Continue scheduled Lomotil at bid. He had flex sig last week which just revealed fissures. 2. Metabolic acidosis-resolved. Off bicarb. 3. severe hypokalemia-improving. Continue scheduled potassium twice daily. He was on Lasix at home for some reason. This was stopped Friday. Would not resume this. 4. Hypomagnesemia-resolved after replacement  5. Severe oral candidiasis/mucositis-continue IV diflucan, continue magic mouthwash with nystatin. This looks much better today. 6. afib versus MAT-P on board. On p.o. Cardizem and digoxin. No need for anticoagulation. 7. R malleolar fracture-non surgical. Ortho on board. PT/OT to assess. 8. Dm 2-increase lantus to 18 units. Continue SSI. 9. Syncope, recurrent falls-likely orthostatic hypotension from profuse diarrhea. 10. Disposition-hopefully dc home tomorrow.         Diet: Dietary Nutrition Supplements: Diabetic Oral Supplement  DIET CARB CONTROL;  Code:Full Code  DVT PPX: jhonatan Nance PA-C   1/3/2021 2:51 PM

## 2021-01-03 NOTE — PROGRESS NOTES
[facility]   Progress Note      1/3/2021 2:24 PM        Name: Cyn French . Admitted: 12/29/2020    SUBJECTIVE:  Feeling better. Diarrhea continues to subside. Able to eat and drink. Mouth soreness is less. Denies abdominal pain.      Reviewed interval ancillary notes    Current Medications      potassium chloride (KLOR-CON M) extended release tablet 40 mEq, BID WC      benzocaine-menthol (CEPACOL SORE THROAT) lozenge 1 lozenge, Q2H PRN      diphenoxylate-atropine (LOMOTIL) 2.5-0.025 MG per tablet 1 tablet, 4x Daily      insulin glargine (LANTUS;BASAGLAR) injection pen 15 Units, QAM      potassium chloride (KLOR-CON M) extended release tablet 40 mEq, PRN    Or      potassium bicarb-citric acid (EFFER-K) effervescent tablet 40 mEq, PRN    Or      potassium chloride 10 mEq/100 mL IVPB (Peripheral Line), PRN      digoxin (LANOXIN) tablet 125 mcg, Daily      dilTIAZem (CARDIZEM 12 HR) extended release capsule 60 mg, BID      magic (miracle) mouthwash with nystatin, 4x Daily PRN      perflutren lipid microspheres (DEFINITY) injection 1.65 mg, ONCE PRN      fluconazole (DIFLUCAN) in 0.9 % sodium chloride IVPB 200 mg, Q24H      octreotide (SANDOSTATIN) injection 100 mcg, TID PRN      HYDROmorphone HCl PF (DILAUDID) injection 0.5 mg, Q30 Min PRN      HYDROmorphone HCl PF (DILAUDID) injection 0.5 mg, Q4H PRN      HYDROcodone-acetaminophen (NORCO) 5-325 MG per tablet 1 tablet, Q4H PRN      tamsulosin (FLOMAX) capsule 0.4 mg, BID      glucose (GLUTOSE) 40 % oral gel 15 g, PRN      dextrose 50 % IV solution, PRN      glucagon (rDNA) injection 1 mg, PRN      dextrose 5 % solution, PRN      sodium chloride flush 0.9 % injection 10 mL, 2 times per day      sodium chloride flush 0.9 % injection 10 mL, PRN      enoxaparin (LOVENOX) injection 40 mg, Daily      promethazine (PHENERGAN) tablet 12.5 mg, Q6H PRN    Or      ondansetron (ZOFRAN) injection 4 mg, Q6H PRN     With suprapubic cath in place  Encourage po hydration  Continue local care  polyethylene glycol (GLYCOLAX) packet 17 g, Daily PRN      acetaminophen (TYLENOL) tablet 650 mg, Q6H PRN    Or      acetaminophen (TYLENOL) suppository 650 mg, Q6H PRN      insulin lispro (1 Unit Dial) 0-12 Units, TID WC      insulin lispro (1 Unit Dial) 0-6 Units, Nightly      aspirin chewable tablet 81 mg, Daily        Objective:  /85   Pulse 95   Temp 97.8 °F (36.6 °C) (Oral)   Resp 16   Ht 5' 8\" (1.727 m)   Wt 184 lb (83.5 kg)   SpO2 95%   BMI 27.98 kg/m²     Intake/Output Summary (Last 24 hours) at 1/3/2021 1424  Last data filed at 1/2/2021 2116  Gross per 24 hour   Intake 240 ml   Output --   Net 240 ml      Wt Readings from Last 3 Encounters:   12/29/20 184 lb (83.5 kg)   12/21/20 200 lb (90.7 kg)   11/02/20 205 lb (93 kg)       General appearance:  Appears comfortable. Color improved. Eyes: Sclera clear. Pupils equal.  ENT: Moist oral mucosa. Trachea midline, no adenopathy. Thrush and oral mucositis improved significantly. Cardiovascular: Regular rhythm, normal S1, S2. No murmur. No edema in lower extremities  Respiratory: Not using accessory muscles. Good inspiratory effort. Clear to auscultation bilaterally, no wheeze or crackles. GI: Abdomen soft, no tenderness, not distended  Musculoskeletal: No cyanosis in digits, neck supple  Neurology: CN 2-12 grossly intact. No speech or motor deficits  Psych: Normal affect. Alert and oriented in time, place and person  Skin: Warm, dry, normal turgor    Labs and Tests:  CBC:   Recent Labs     01/01/21  1120 01/02/21  0900 01/03/21  0918   WBC 5.0 2.4* 3.7*   HGB 8.9* 8.2* 8.3*    146 128*     BMP:    Recent Labs     01/01/21  1120 01/02/21  0900 01/03/21  0918   * 135* 137   K 3.3* 3.5 3.6    108 109   CO2 22 21 20*   BUN 9 8 8   CREATININE 1.2 0.9 0.8   GLUCOSE 132* 226* 225*     Hepatic:   No results for input(s): AST, ALT, ALB, BILITOT, ALKPHOS in the last 72 hours.     Echo Complete    Result Date: 12/30/2020  Transthoracic Echocardiography Report (TTE)  Demographics   Patient Name      Luis Angel Casas   Date of Study     12/30/2020          Gender              Male   Patient Number    [id number]          Date of Birth       1960   Visit Number      [id number]           Age                 63 year(s)   Accession Number  [id number]          Room Number         3592   Corporate ID      [id number]            [facility] Paci, RVT, BS   Ordering          Harley Rodriguez, Interpreting        Kamron Porter MD  Physician         MD                  Physician  Procedure Type of Study   TTE procedure:ECHOCARDIOGRAM COMPLETE 2D W DOPPLER W COLOR. Procedure Date Date: 12/30/2020 Start: 08:40 AM Study Location: [facility] - Echo Lab Technical Quality: Good visualization Indications:Atrial fibrillation. Patient Status: Routine Height: 68 inches Weight: 184 pounds BSA: 1.97 m2 BMI: 27.98 kg/m2 BP: 125/72 mmHg  Conclusions   Summary  *Left ventricle - normal size, thickness and function with EF of 60%  *Mitral valve - trivial regurgitation  *Aortic valve - sclerotic  *Tricuspid valve - trivial regurgitation   Signature   ------------------------------------------------------------------  Electronically signed by Kamron Porter MD (Interpreting  physician) on 12/30/2020 at 03:05 PM  ------------------------------------------------------------------   Findings   Left Ventricle  Normal left ventricle size, wall thickness, and systolic function with an  estimated ejection fraction of 60%. No regional wall motion abnormalities  are seen. Average E/e': 7.9. Indeterminate diastolic function. Mitral Valve  The mitral valve normal in structure and function. Trivial mitral regurgitation is present. Left Atrium  The left atrium is normal in size.    Aortic Valve  The aortic valve is structurally normal. Aortic valve appears sclerotic but  opens adequately. Tricuspid aortic valve. No evidence of aortic valve  regurgitation or stenosis. Aorta  The aortic root is normal in size. The ascending aorta is normal in size. Right Ventricle  The right ventricle is normal in size and function. TAPSE: 1.9 cm. RV S' velocity: 19.4 cm/s. Tricuspid Valve  The tricuspid valve is normal in structure and function. Trivial tricuspid  regurgitation. PASP could not be determined due to inadequate TR envelope. Right Atrium  The right atrial size is normal.   Pulmonic Valve  Not well seen. There is no evidence of pulmonic valve regurgitation or  stenosis. Pericardial Effusion  No pericardial effusion noted. Pleural Effusion  No pleural effusion. Miscellaneous  Could not visualize subcostal views due to previous Whipple procedure.   M-Mode/2D Measurements (cm)   LV Diastolic Dimension: 6.00 cm LV Systolic Dimension: 7.32 cm  LV Septum Diastolic: 0.16 cm  LV PW Diastolic: 9.55 cm        AO Root Dimension: 3.6 cm                                  LA Dimension: 2.5 cm                                  LA Area: 18.2 cm2  LVOT: 2.3 cm                    LA volume/Index: 46 ml /23 ml/m2  Doppler Measurements   AV Peak Velocity: 183 cm/s     MV Peak E-Wave: 73.7 cm/s  AV Peak Gradient: 13.4 mmHg    MV Peak A-Wave: 63.3 cm/s  AV Mean Gradient: 7 mmHg       MV E/A Ratio: 1.16  LVOT Peak Velocity: 124 cm/s  AV Area (Continuity):3.21 cm2   TR Velocity:246 cm/s  TR Gradient:24.21 mmHg         MV Deceleration Time: 243 msec   E' Septal Velocity: 8.39 cm/s  E' Lateral Velocity: 10.6 cm/s  PV Peak Velocity: 143 cm/s  PV Peak Gradient: 8.18 mmHg   Aortic Valve   Peak Velocity: 183 cm/s     Mean Velocity: 128 cm/s  Peak Gradient: 13.4 mmHg    Mean Gradient: 7 mmHg  Area (continuity): 3.21 cm2  AV VTI: 26.5 cm  Aorta   Aortic Root: 3.6 cm  Ascending Aorta: 3.6 cm  LVOT Diameter: 2.3 cm      Xr Ankle Right (min 3 Views)    Result Date: 12/29/2020  EXAMINATION: THREE XRAY VIEWS OF THE RIGHT ANKLE 12/29/2020 3:28 pm COMPARISON: None. HISTORY: ORDERING SYSTEM PROVIDED HISTORY: injury TECHNOLOGIST PROVIDED HISTORY: Reason for exam:->injury Reason for Exam: right ankle pain Acuity: Acute Type of Exam: Initial Mechanism of Injury: fall FINDINGS: There is a minimally displaced fracture of the lateral malleolus. There also appears to be very small fracture involving the periarticular anterior tibia, shown on the lateral image. No dislocation. Minimally displaced fracture of the lateral malleolus. Possible very small coexistent fracture the periarticular anterior tibia, versus artifact from overlying shadows. Xr Foot Right (min 3 Views)    Result Date: 12/29/2020  EXAMINATION: XRAY VIEWS OF THE RIGHT FOOT 12/29/2020 3:28 pm COMPARISON: None. HISTORY: ORDERING SYSTEM PROVIDED HISTORY: fall TECHNOLOGIST PROVIDED HISTORY: Reason for exam:->fall Reason for Exam: right foot pain Acuity: Acute Type of Exam: Initial Mechanism of Injury: fall FINDINGS: No acute fracture or dislocation present. Slight soft tissue swelling of the forefoot may be present. The bones are osteoporotic. No significant degenerative changes. Slight soft tissue swelling question in the forefoot. No acute bony abnormality. Bones are osteoporotic. Ct Head Wo Contrast    Result Date: 12/29/2020  EXAMINATION: CT OF THE HEAD WITHOUT CONTRAST  12/29/2020 5:06 pm TECHNIQUE: CT of the head was performed without the administration of intravenous contrast. Dose modulation, iterative reconstruction, and/or weight based adjustment of the mA/kV was utilized to reduce the radiation dose to as low as reasonably achievable. COMPARISON: None. HISTORY: ORDERING SYSTEM PROVIDED HISTORY: fall TECHNOLOGIST PROVIDED HISTORY: Reason for exam:->fall Has a \"code stroke\" or \"stroke alert\" been called? ->No Reason for Exam: fall Acuity: Acute Type of Exam: Initial Mechanism of Injury: fall base of the heart is normal. Organs: 6.4 cm cyst in the caudate lobe that appears stable. The gallbladder is surgically absent. Subtle area of decreased attenuation in the right hepatic lobe measuring approximately 9 mm similar to prior imaging. This is indeterminate. Prior surgical change of Whipple procedure. Postsurgical appearance of the pancreas with body and tail unremarkable. The spleen is normal.  Multiple nonobstructing calculi in the bilateral kidneys. No hydronephrosis or hydroureter. The adrenal glands are normal. GI/Bowel: Stomach and small bowel show no significant abnormalities status post prior Whipple. There is no mucosal inflammation or edema. No pattern of obstruction. A normal appendix is visualized. No mucosal abnormalities of the colon. Pelvis: The bladder is unremarkable. The prostate is enlarged and demonstrates heterogeneous attenuation. Peritoneum/Retroperitoneum: The aorta tapers normally. Stable necrotic structure in the retroperitoneum anterior to the aorta just below the level of the SMA. This was described as a necrotic lymph node on prior CT. No additional areas of lymph node enlargement. Bones/Soft Tissues: Remote L1 compression fracture with minimal loss of height to the superior endplate. 1. No evidence of acute GI bleed. 2. Prior history of Whipple procedure with no abnormalities of the GI tract noted otherwise. 3. Stable necrotic lesion in the retroperitoneum anterior to the aorta suspected to represent a necrotic lymph node. Correlate with prior oncology and surgical history. 4. Stable hepatic cyst in the caudate lobe. A subtle area of low attenuation in the right hepatic lobe cannot be characterized by CT but does appear similar to prior exam.  MRI may be considered for further evaluation if clinically indicated. 5. Bilateral nonobstructing nephrolithiasis.      ASSESSMENT AND PLAN    Active Problems:    Diarrhea    Pancytopenia ([address]    Malleolar fracture, right, closed, initial encounter    Hypokalemia    Atrial fibrillation (HCC)    Multifocal atrial tachycardia (HCC)    Hyponatremia  Resolved Problems:    * No resolved hospital problems. 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 Improving. Mucositis and likely some oropharyngeal candidiasis. Improving. Weight loss. Dehydration. Leukopenia and anemia due to chemotherapy. Hypokalemia and hypomagnesemia. Improving. Metabolic acidosis. Diabetes. Metastatic pancreatic carcinoma. Treated with surgery and chemotherapy. Had gemcitabine and abraxane. Recently started on Onivyde. Minimally displaced fracture of the lateral malleolus.     Reviewed his labs. Hypokalemia resolved. Pancytopenia due to chemotherapy. Counts stable. Continue supportive care. Explained to the patient and his daughter. Hopefully discharged in a day or two.        Lola Ortiz MD  [facility]

## 2021-01-03 NOTE — PLAN OF CARE
Problem: Falls - Risk of:  Goal: Will remain free from falls  Description: Will remain free from falls  Outcome: Ongoing  Goal: Absence of physical injury  Description: Absence of physical injury  Outcome: Ongoing     Problem: Pain:  Description: Pain management should include both nonpharmacologic and pharmacologic interventions.   Goal: Pain level will decrease  Description: Pain level will decrease  Outcome: Ongoing  Goal: Control of acute pain  Description: Control of acute pain  Outcome: Ongoing  Goal: Control of chronic pain  Description: Control of chronic pain  Outcome: Ongoing     Problem: Nutrition  Goal: Optimal nutrition therapy  Outcome: Ongoing     Problem: Skin Integrity:  Goal: Will show no infection signs and symptoms  Description: Will show no infection signs and symptoms  Outcome: Ongoing  Goal: Absence of new skin breakdown  Description: Absence of new skin breakdown  Outcome: Ongoing
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
## 2021-01-04 NOTE — PROGRESS NOTES
assessment completed, patient denies needs at this time, call light in reach, will continue to monitor. 1 loose stool BM thus far.  Electronically signed by Ingrid Mary RN on 1/4/2021 at 3:35 AM

## 2021-01-04 NOTE — PROGRESS NOTES
8:45 AM EST: Morning assessment completed, patient denies needs at this time, call light in reach, will continue to monitor. The care plan and education has been reviewed and mutually agreed upon with the patient. Educated patient on the potential for falls during their hospital stay. Reviewed current fall safety protocol. Reviewed indication for use of bed alarm. Patient verbalized understanding. Patient helped to RR, had BM. Slowing in frequency. 10:41 AM EST: Patient resting in bed, call light within reach, fall precautions in place. Denies needs at this time. 3:43 PM EST: Patient up in chair, resting comfortably.

## 2021-01-04 NOTE — PROGRESS NOTES
Occupational Therapy  Facility/Department: [address] ORTHO/NEURO NURSING  Daily Treatment Note  NAME: Chasity Last  : 1960  MRN: [id number]    Date of Service: 2021    Discharge Recommendations:      Chasity Last scored a 19/24 on the AM-PAC ADL Inpatient form. Current research shows that an AM-PAC score of 18 or greater is typically associated with a discharge to the patient's home setting. Based on the patient's AM-PAC score, and their current ADL deficits, it is recommended that the patient have 2-3 sessions per week of Occupational Therapy at d/c to increase the patient's independence. At this time, this patient demonstrates the endurance and safety to discharge home with home OT and a follow up treatment frequency of 2-3x/wk. Please see assessment section for further patient specific details. If patient discharges prior to next session this note will serve as a discharge summary. Please see below for the latest assessment towards goals. HOME HEALTH CARE: LEVEL 3 SAFETY     - Initial home health evaluation to occur within 24-48 hours, in patient home   - Therapy evaluations in home within 24-48 hours of discharge; including DME and home safety   - Frontload therapy 5 days, then 3x a week   - Therapy to evaluate if patient has [address] needs for personal care   -  evaluation within 24-48 hours, includes evaluation of resources and insurance to determine [place], [place], LTC, and Medicaid options    OT Equipment Recommendations  Equipment Needed: Yes  Mobility Devices: ADL Assistive Devices  ADL Assistive Devices: Shower Chair with back; Toileting - 3-in-1 Commode;Emergency Alert System    Assessment   Performance deficits / Impairments: Decreased functional mobility ; Decreased endurance;Decreased ADL status; Decreased high-level IADLs  Assessment: Pt agreeable to wear talking boot this date and participate in functional mobility and ADL tasks.   Pt presents with decreased endurance and is not at baseline level of functioning and would benefit from ongoing skilled OT services in order to return to PLOF  Treatment Diagnosis: Decreaed ADL/IADL status, endurance and functional mobility associated with R closed malleolar fx  Prognosis: Good  Decision Making: Medium Complexity  OT Education: OT Role;Plan of Care;Transfer Training;Equipment;IADL Safety; ADL Adaptive Strategies; Energy Conservation  Patient Education: Pt verbalized and demo'd understanding  REQUIRES OT FOLLOW UP: Yes  Activity Tolerance  Activity Tolerance: Patient Tolerated treatment well;Patient limited by fatigue  Safety Devices  Safety Devices in place: Yes  Type of devices: All fall risk precautions in place;Call light within reach; Bed alarm in place;Gait belt;Patient at risk for falls; Left in bed;Nurse notified         Patient Diagnosis(es): The primary encounter diagnosis was Hypokalemia. Diagnoses of Generalized weakness, Diarrhea, unspecified type, and Closed nondisplaced fracture of lateral malleolus of right fibula, initial encounter were also pertinent to this visit. has a past medical history of Allergic rhinitis, Cancer ([facility] Gout, Hypertension, Prolonged emergence from general anesthesia, and Type II or unspecified type diabetes mellitus without mention of complication, not stated as uncontrolled. has a past surgical history that includes Knee arthroscopy (Left); Shoulder Arthroplasty (Right, 2012); Upper gastrointestinal endoscopy (02/21/2018); other surgical history (02/26/2018); Pancreas surgery; Cystocopy (06/19/2018); Endoscopy, colon, diagnostic; Port Surgery (Right, 11/2/2020); Port Surgery (Left, 11/2/2020); and sigmoidoscopy (N/A, 12/22/2020).     Restrictions  Restrictions/Precautions  Restrictions/Precautions: Weight Bearing, Fall Risk(high fall risk)  Required Braces or Orthoses?: Yes  Lower Extremity Weight Bearing Restrictions  Right Lower Extremity Weight Bearing: Weight Bearing As Tolerated  Required Braces or Orthoses  Right Lower Extremity Brace: Boot  RLE Brace Type: Tall walking boot  Position Activity Restriction  Other position/activity restrictions: Sue Botello is a 63 y.o. male who presents to the emergency department today for evaluation for general fatigue, nausea, and diarrhea. The patient has a history of pancreatic cancer, is followed by Dr. Jj Sterling, oncology. The patient states that for the past few days he has had increasing fatigue, and multiple episodes of diarrhea. There has been no blood in the stool or black tarry appearance of the stool. He was recently admitted for GI bleed, colonoscopy at that time did show anal fissures and hemorrhoids. The patient was sent home from the hospital last Tuesday. He has not really been eating or drinking much since being home. The family states that the patient had 3 falls 6 days ago, and family is concerned about the bruising to the ankle as well as to the foot. The patient did fall and hit his head 1 time next days ago, but there is no loss of consciousness. No vomiting. The patient has been acting normally since. Patient has no headaches the patient was supposed to have his therapy today, however after seeing his oncologist, having basic labs drawn, he was told to come to the emergency room because his potassium was low. The patient is complaining of generalized abdominal pain which he is rating is a 6/10. States that this is due to his normal pain that he has. He is not had any vomiting. He has no chest pain or shortness of breath. He has no fever chills. He has no cough or congestion. He has no urinary symptoms.   Subjective   General  Chart Reviewed: Yes  Patient assessed for rehabilitation services?: Yes  Additional Pertinent Hx: Pancreatic Ca, chemotherapy  Response to previous treatment: Patient with no complaints from previous session  Family / Caregiver Present: No  Referring Practitioner: Rachana Manuel Cardoso MD, for d/c planning  Diagnosis: R closed malleolar fx  Subjective  Subjective: Pt seated in recliner chair and agreeable to therapy session. Vital Signs  Patient Currently in Pain: Denies   Orientation  Orientation  Overall Orientation Status: Within Functional Limits  Objective    ADL  Grooming: Stand by assistance(in stance at sink to perform hand hygiene)  UE Bathing: None  LE Bathing: None  UE Dressing: Minimal assistance(donning gown as back)  LE Dressing: Minimal assistance(min A to girish tall walking boot and thread L LE into depends--CGA to perform LB clothing management over hips once in stance)  Toileting: Contact guard assistance(pt performed rear hygiene in stance at [address]  for clothing management)  Additional Comments: Pt performed functional mobility to restroom and transferred to toilet. Grooming tasks performed in stance at sink. Increased time to complete d/t fatigue/decreased activity tolerance        Balance  Sitting Balance: Stand by assistance  Standing Balance: Contact guard assistance  Standing Balance  Time: ~12 minutes  Activity: functional mobility to/from restroom and in hallway, stance for ADL completion, functional transfers  Comment: with use of rw, no LOB  Functional Mobility  Functional - Mobility Device: Rolling Walker  Activity: To/from bathroom; Other  Assist Level: Contact guard assistance  Toilet Transfers  Toilet - Technique: Ambulating  Equipment Used: Standard toilet  Toilet Transfer: Minimal assistance  Toilet Transfers Comments: grab bar used, min A performing sit > stand from toilet  Bed mobility  Sit to Supine: Stand by assistance  Scooting: Stand by assistance  Transfers  Sit to stand: Contact guard assistance  Stand to sit: Contact guard assistance     Cognition  Overall Cognitive Status: WFL  Safety Judgement: Decreased awareness of need for safety     Perception  Overall Perceptual Status: [facility]       Plan   Plan  Times per week: 7  Times per day: Daily  Current Treatment Recommendations: Self-Care / ADL, Endurance Training, Functional Mobility Training, Safety Education & Training       AM-PAC Score        AM-PAC Inpatient Daily Activity Raw Score: 19 (01/04/21 1516)  AM-PAC Inpatient ADL T-Scale Score : 40.22 (01/04/21 1516)  ADL Inpatient CMS 0-100% Score: 42.8 (01/04/21 1516)  ADL Inpatient CMS G-Code Modifier : CK (01/04/21 1516)    Goals  Short term goals  Time Frame for Short term goals: Discharge  Short term goal 1: Mod I for functional transfers to ADL surfaces w/RW--ongoing 1/4  Short term goal 2: Mod I for functional mobility for ADL activity w/RW---ongoing 1/4  Short term goal 3: Mod I for UB bathing/dressing---ongoing 1/4  Short term goal 4: Mod I for LB bathing/dressing---ongoing 1/4  Short term goal 5: pt to tolerate standing 5 min for ADL activity---goal met 1/4       Therapy Time   Individual Concurrent Group Co-treatment   Time In       1400   Time Out       1446   Minutes       46      Timed Code Treatment Minutes:  46 Minutes  Total Treatment Minutes:  [address] [employer], [address]    Occupational Therapist read and agrees to the above written documentation  KARRIE Cantor/TREVA [id number]

## 2021-01-04 NOTE — PROGRESS NOTES
Oncology and Hematology Care   Progress Note      1/4/2021 8:12 AM        Name: Remi Brown . Admitted: 12/29/2020    SUBJECTIVE:  Feeling much better. Still having some soreness on his tongue but much improved. No longer having diarrhea.      Reviewed interval ancillary notes    Current Medications      insulin glargine (LANTUS;BASAGLAR) injection pen 18 Units, QAM      potassium chloride (KLOR-CON M) extended release tablet 40 mEq, BID WC      benzocaine-menthol (CEPACOL SORE THROAT) lozenge 1 lozenge, Q2H PRN      diphenoxylate-atropine (LOMOTIL) 2.5-0.025 MG per tablet 1 tablet, 4x Daily      potassium chloride (KLOR-CON M) extended release tablet 40 mEq, PRN    Or      potassium bicarb-citric acid (EFFER-K) effervescent tablet 40 mEq, PRN    Or      potassium chloride 10 mEq/100 mL IVPB (Peripheral Line), PRN      digoxin (LANOXIN) tablet 125 mcg, Daily      dilTIAZem (CARDIZEM 12 HR) extended release capsule 60 mg, BID      magic (miracle) mouthwash with nystatin, 4x Daily PRN      perflutren lipid microspheres (DEFINITY) injection 1.65 mg, ONCE PRN      fluconazole (DIFLUCAN) in 0.9 % sodium chloride IVPB 200 mg, Q24H      octreotide (SANDOSTATIN) injection 100 mcg, TID PRN      HYDROmorphone HCl PF (DILAUDID) injection 0.5 mg, Q30 Min PRN      HYDROmorphone HCl PF (DILAUDID) injection 0.5 mg, Q4H PRN      HYDROcodone-acetaminophen (NORCO) 5-325 MG per tablet 1 tablet, Q4H PRN      tamsulosin (FLOMAX) capsule 0.4 mg, BID      glucose (GLUTOSE) 40 % oral gel 15 g, PRN      dextrose 50 % IV solution, PRN      glucagon (rDNA) injection 1 mg, PRN      dextrose 5 % solution, PRN      sodium chloride flush 0.9 % injection 10 mL, 2 times per day      sodium chloride flush 0.9 % injection 10 mL, PRN      enoxaparin (LOVENOX) injection 40 mg, Daily      promethazine (PHENERGAN) tablet 12.5 mg, Q6H PRN    Or      ondansetron (ZOFRAN) injection 4 mg, Q6H PRN     polyethylene glycol (GLYCOLAX) packet 17 g, Daily PRN      acetaminophen (TYLENOL) tablet 650 mg, Q6H PRN    Or      acetaminophen (TYLENOL) suppository 650 mg, Q6H PRN      insulin lispro (1 Unit Dial) 0-12 Units, TID WC      insulin lispro (1 Unit Dial) 0-6 Units, Nightly      aspirin chewable tablet 81 mg, Daily        Objective:  /78   Pulse 86   Temp 98.5 °F (36.9 °C) (Axillary)   Resp 16   Ht 5' 8\" (1.727 m)   Wt 184 lb (83.5 kg)   SpO2 98%   BMI 27.98 kg/m²     Intake/Output Summary (Last 24 hours) at 1/4/2021 0812  Last data filed at 1/4/2021 0500  Gross per 24 hour   Intake --   Output 500 ml   Net -500 ml      Wt Readings from Last 3 Encounters:   12/29/20 184 lb (83.5 kg)   12/21/20 200 lb (90.7 kg)   11/02/20 205 lb (93 kg)          General appearance:  Appears comfortable. Color improved. Eyes: Sclera clear. Pupils equal.  ENT: Moist oral mucosa. Trachea midline, no adenopathy. Thrush and oral mucositis improved significantly. Cardiovascular: Regular rhythm, normal S1, S2. No murmur. No edema in lower extremities  Respiratory: Not using accessory muscles. Good inspiratory effort. Clear to auscultation bilaterally, no wheeze or crackles. GI: Abdomen soft, no tenderness, not distended  Musculoskeletal: No cyanosis in digits, neck supple  Neurology: CN 2-12 grossly intact. No speech or motor deficits  Psych: Normal affect. Alert and oriented in time, place and person  Skin: Warm, dry, normal turgor      Labs and Tests:  CBC:   Recent Labs     01/02/21  0900 01/03/21  0918 01/04/21  0554   WBC 2.4* 3.7* 3.6*   HGB 8.2* 8.3* 8.0*    128* 140     BMP:    Recent Labs     01/02/21  0900 01/03/21  0918 01/04/21  0554   * 137 136   K 3.5 3.6 3.7    109 108   CO2 21 20* 21   BUN 8 8 5*   CREATININE 0.9 0.8 0.8   GLUCOSE 226* 225* 118*     Hepatic: No results for input(s): AST, ALT, ALB, BILITOT, ALKPHOS in the last 72 hours.     ASSESSMENT AND PLAN    Active Problems:

## 2021-01-04 NOTE — PROGRESS NOTES
[facility]    1/4/2021 9:53 AM        Name: Eloy Galloway . Admitted: 12/29/2020  Primary Care Provider: Beatris Siu MD (Tel: [phone])      Subjective:  . Patient admitted with severe hypokalemia secondary to toxic enterocolitis as a result of chemotherapy. He also had severe mucositis with some oral candidiasis. This has improved significantly. He has been stooling less. We discussed d/c home today, however he declined. Reports Dr Kathy Holly said \" I will be d/c tomorrow\"       He lives alone however his daughter has been staying with him the past few days as he has fallen 3 times. Seems to correlate with getting up and moving around or slipping he has had profuse diarrhea for several weeks now. He is going to the bathroom every hour. He denies abdominal pain. He states that he cannot eat a lot as it just goes right through him. He denies chest pain he started having some dysphagia and difficulties and painful swallowing yesterday. He was here a week ago with some rectal bleeding and had a flex sig revealing some anal fissures.     Reviewed interval ancillary notes    Current Medications      insulin glargine (LANTUS;BASAGLAR) injection pen 18 Units, QAM      potassium chloride (KLOR-CON M) extended release tablet 40 mEq, BID WC      benzocaine-menthol (CEPACOL SORE THROAT) lozenge 1 lozenge, Q2H PRN      diphenoxylate-atropine (LOMOTIL) 2.5-0.025 MG per tablet 1 tablet, 4x Daily      potassium chloride (KLOR-CON M) extended release tablet 40 mEq, PRN    Or      potassium bicarb-citric acid (EFFER-K) effervescent tablet 40 mEq, PRN    Or      potassium chloride 10 mEq/100 mL IVPB (Peripheral Line), PRN      digoxin (LANOXIN) tablet 125 mcg, Daily      dilTIAZem (CARDIZEM 12 HR) extended release capsule 60 mg, BID      magic (miracle) mouthwash with nystatin, 4x Daily Results for [place] (MRN [id number]) as of 1/4/2021 15:28   Ref. Range 1/3/2021 10:30 1/3/2021 16:22 1/3/2021 19:23 1/4/2021 07:15 1/4/2021 10:57   POC Glucose Latest Ref Range: 70 - 99 mg/dl 237 (H) 169 (H) 216 (H) 120 (H) 221 (H)       AIC 9.7%      Problem List  Active Problems:    Diarrhea    Pancytopenia (HCC)    Malleolar fracture, right, closed, initial encounter    Hypokalemia    Atrial fibrillation (HCC)    Multifocal atrial tachycardia (HCC)    Hyponatremia  Resolved Problems:    * No resolved hospital problems. *       Assessment & Plan:   1. Toxic enterocolitis-  Secondary to chemotherapy. Stool studies unremarkable. Continue octreotide. Continue scheduled Lomotil at bid. He had flex sig last week which just revealed fissures. 2. Metabolic acidosis-resolved. Off bicarb. 3. severe hypokalemia-improving. Continue scheduled potassium twice daily. He was on Lasix at home for some reason. This was stopped Friday. Would not resume at d/c   4. Hypomagnesemia: improved with replacement   5. Severe oral candidiasis/mucositis: continue diflucan , magic mouthwash with nystatin. 6. afib versus MAT-P on board. On p.o. Cardizem and digoxin. No need for anticoagulation. 7. R malleolar fracture-non surgical. Ortho on board. PT/OT to assess. 8. T2DM:  Received lantus 18 with improved fasting BG. Continue with correction for now appetite is unpredictable   9. Syncope, recurrent falls-likely orthostatic hypotension from profuse diarrhea. Metastatic pancreatic cancer:  S/p surgery and chemo. Pt wishes to continue with chemo per Dr Jj Sterling. Anticipate d/c home in the am.  Pt declined any home care services. He reports that one of his 4 daughters will be spending the night with him.         Diet: Dietary Nutrition Supplements: Diabetic Oral Supplement  DIET CARB CONTROL;  Code:Full Code  DVT PPX: jhonatan Chiu, APRN - CNP   1/4/2021 9:53 AM

## 2021-01-04 NOTE — PROGRESS NOTES
[address] Received home care referral. Will follow. Addendum: Spoke with pt  re: home care and plan of care. Pt declined home care, and home therapy. Discharge planner notified.

## 2021-01-04 NOTE — PROGRESS NOTES
Physical Therapy  Facility/Department: [address] ORTHO/NEURO NURSING  Daily Treatment Note  NAME: Kennedy Altamirano  : 1960  MRN: [id number]    Date of Service: 2021  Kennedy Altamirano scored a 17/24 on the AM-PAC short mobility form. Current research shows that an AM-PAC score of 17 or less is typically not associated with a discharge to the patient's home setting. Based on the patient's AM-PAC score and their current functional mobility deficits, it is recommended that the patient have 3-5 sessions per week of Physical Therapy at d/c to increase the patient's independence. Please see assessment section for further patient specific details. If patient discharges prior to next session this note will serve as a discharge summary. Please see below for the latest assessment towards goals. Discharge Recommendations:  3-5 sessions per week   PT Equipment Recommendations  Equipment Needed: No  Other: Pt has RW at home    Assessment   Body structures, Functions, Activity limitations: Decreased functional mobility ; Decreased ADL status; Decreased ROM; Decreased strength;Decreased endurance;Decreased balance; Increased pain  Assessment: Patient continues to need min A for transfers, but able to safely ambulate with use of RW and SBA. Declines therapy upon d/c. States his children will take turns spending the night with him.   Treatment Diagnosis: impaired functional mobility  Prognosis: Good  Decision Making: Medium Complexity  PT Education: Goals;PT Role;Plan of Care;Gait Training;Transfer Training;Functional Mobility Training  Patient Education: pt verbalized understanding, verbally education on stair navigation and safe car transfers as well as purpose of tall walking boot 1/3  Barriers to Learning: emotional  REQUIRES PT FOLLOW UP: Yes  Activity Tolerance  Activity Tolerance: Patient limited by fatigue;Patient Tolerated treatment well  Activity Tolerance: tolerates minimal activity prior to fatigue     Patient Diagnosis(es): The primary encounter diagnosis was Hypokalemia. Diagnoses of Generalized weakness, Diarrhea, unspecified type, and Closed nondisplaced fracture of lateral malleolus of right fibula, initial encounter were also pertinent to this visit. has a past medical history of Allergic rhinitis, Cancer ([facility] [address] Gout, Hypertension, Prolonged emergence from general anesthesia, and Type II or unspecified type diabetes mellitus without mention of complication, not stated as uncontrolled. has a past surgical history that includes Knee arthroscopy (Left); Shoulder Arthroplasty (Right, 2012); Upper gastrointestinal endoscopy (02/21/2018); other surgical history (02/26/2018); Pancreas surgery; Cystocopy (06/19/2018); Endoscopy, colon, diagnostic; Port Surgery (Right, 11/2/2020); Port Surgery (Left, 11/2/2020); and sigmoidoscopy (N/A, 12/22/2020). Restrictions  Restrictions/Precautions  Restrictions/Precautions: Weight Bearing, Fall Risk  Required Braces or Orthoses?: Yes  Lower Extremity Weight Bearing Restrictions  Right Lower Extremity Weight Bearing: Weight Bearing As Tolerated  Required Braces or Orthoses  Right Lower Extremity Brace: Boot  RLE Brace Type: Tall walking boot  Position Activity Restriction  Other position/activity restrictions: Sekou Hernandez is a 63 y.o. male who presents to the emergency department today for evaluation for general fatigue, nausea, and diarrhea. The patient has a history of pancreatic cancer, is followed by Dr. Jono Piedra, oncology. The patient states that for the past few days he has had increasing fatigue, and multiple episodes of diarrhea. There has been no blood in the stool or black tarry appearance of the stool. He was recently admitted for GI bleed, colonoscopy at that time did show anal fissures and hemorrhoids. The patient was sent home from the hospital last Tuesday. He has not really been eating or drinking much since being home.   The family states that the patient had 3 falls 6 days ago, and family is concerned about the bruising to the ankle as well as to the foot. The patient did fall and hit his head 1 time next days ago, but there is no loss of consciousness. No vomiting. The patient has been acting normally since. Patient has no headaches the patient was supposed to have his therapy today, however after seeing his oncologist, having basic labs drawn, he was told to come to the emergency room because his potassium was low. The patient is complaining of generalized abdominal pain which he is rating is a 6/10. States that this is due to his normal pain that he has. He is not had any vomiting. He has no chest pain or shortness of breath. He has no fever chills. He has no cough or congestion. He has no urinary symptoms. Subjective   General  Chart Reviewed: Yes  Response To Previous Treatment: Patient with no complaints from previous session. Family / Caregiver Present: No  Subjective  Subjective: Denied pain. Agreeable to PT and use of restroom.   General Comment  Comments: Sitting in chair upon arrival.          Orientation  Orientation  Overall Orientation Status: Within Functional Limits  Cognition      Objective   Bed mobility  Sit to Supine: Stand by assistance(Flat Bed)  Scooting: Stand by assistance  Transfers  Sit to Stand: Minimal Assistance  Stand to sit: Minimal Assistance  Comment: Sit<>stand 3x; required grab bar for toilet transfer  Ambulation  Ambulation?: Yes  WB Status: WBAT with boot RLE  Ambulation 1  Surface: level tile  Device: Rolling Walker  Other Apparatus: AFO  Assistance: Contact guard assistance  Quality of Gait: decreased step length and panda, forward flexed posture  Distance: 150' + 10'  Comments: declined further ambulation due to fatigue  Stairs/Curb  Stairs?: No     Balance  Posture: Fair  Sitting - Static: Good  Sitting - Dynamic: Good  Standing - Static: Fair(with RW)  Standing - Dynamic: Fair(with RW)  Comments: SBA for balance while seated during donning of boot and gown            Comment: declined any therapy upon d/c.              G-Code     OutComes Score                                                     AM-PAC Score  AM-PAC Inpatient Mobility Raw Score : 17 (01/04/21 1501)  AM-PAC Inpatient T-Scale Score : 42.13 (01/04/21 1501)  Mobility Inpatient CMS 0-100% Score: 50.57 (01/04/21 1501)  Mobility Inpatient CMS G-Code Modifier : CK (01/04/21 1501)          Goals  Short term goals  Time Frame for Short term goals: To be met prior to discharge  Short term goal 1: Pt will complete bed mobility with mod I. Short term goal 2: Pt will complete sit to/from stand with mod I. Short term goal 3: Pt will ambulate 50 ft with LRAD and mod I. Short term goal 4: Pt will navigate up/down 2 steps with HR and supervision. Long term goals  Time Frame for Long term goals : LTG=STG  No goals met this treatment. Plan    Plan  Times per week: 7x  Times per day: Daily  Current Treatment Recommendations: Strengthening, ROM, Balance Training, Functional Mobility Training, Transfer Training, Endurance Training, Gait Training, Stair training, Safety Education & Training, Patient/Caregiver Education & Training, Home Exercise Program  Safety Devices  Type of devices: All fall risk precautions in place, Bed alarm in place, Call light within reach, Left in bed, Patient at risk for falls, Nurse notified  Restraints  Initially in place: No     Therapy Time   Individual Concurrent Group Co-treatment   Time In       1400   Time Out       1446   Minutes       46   Timed Code Treatment Minutes: [address], [address]       I agree with the above note. Goals addressed by PT.   Thanks, Sammy Waldron, DPT [id number]

## 2021-01-05 NOTE — PLAN OF CARE
Problem: Falls - Risk of:  Goal: Will remain free from falls  Description: Will remain free from falls  Outcome: Met This Shift  Goal: Absence of physical injury  Description: Absence of physical injury  Outcome: Met This Shift     Problem: Pain:  Goal: Pain level will decrease  Description: Pain level will decrease  Outcome: Met This Shift  Goal: Control of acute pain  Description: Control of acute pain  Outcome: Met This Shift  Goal: Control of chronic pain  Description: Control of chronic pain  Outcome: Met This Shift     Problem: Skin Integrity:  Goal: Will show no infection signs and symptoms  Description: Will show no infection signs and symptoms  Outcome: Met This Shift  Goal: Absence of new skin breakdown  Description: Absence of new skin breakdown  Outcome: Met This Shift     Problem: Nutrition  Goal: Optimal nutrition therapy  Outcome: Met This Shift

## 2021-01-05 NOTE — PROGRESS NOTES
Shift assessment complete. Vital signs stable. Afebrile. Neuro WNL right leg splint in place. Scheduled medications administered & PRN dilaudid given for c/o right leg pain. NSR on tele. Hopeful d/c home tomorrow. The care plan and education has been reviewed and mutually agreed upon with the patient. Fall precautions in place and call light within reach. Will monitor.

## 2021-01-05 NOTE — PROGRESS NOTES
deaccessed port per order. Pt tolerated well. Gave d/c instructions with list of active meds and when they are next due. Reviewed discharge instructions for diarrhea, lomotil, cardizem and digoxin at bedside and provided printed copy of same. Pt verbalized understanding of all instructions. Denied additional questions. pt states he has adequate support at home from family and does not require home care services.

## 2021-01-05 NOTE — PROGRESS NOTES
Pt resting awake in bed. Tolerating small amounts of diet. Reports x 3 episodes of diarrhea overnight. Denies need for pain medication at this time. PO meds taken easily with water. Pt is axox4 and able to answer questions and follow commands throughout assessment.

## 2021-01-05 NOTE — DISCHARGE SUMMARY
[address] [facility] HOSPITALISTS DISCHARGE SUMMARY    Patient Demographics    Patient. [address] [facility]  Date of Birth. 1960  MRN. [id number]     Primary care provider. Caprice Austin MD  (Tel: [phone])    Admit date: 2020    Discharge date 2021  Note Date: 2021     Reason for Hospitalization. Chief Complaint   Patient presents with    Fatigue     Pt sent by Dr. Janet Almanza d/t potassium 2.1 and thrush. Pt has hx pancreatic cancer. Reports diarrhea times 5 weeks         Significant Findings. Active Problems:    Diarrhea    Pancytopenia (HCC)    Malleolar fracture, right, closed, initial encounter    Hypokalemia    Atrial fibrillation (HCC)    Multifocal atrial tachycardia (HCC)    Hyponatremia  Resolved Problems:    * No resolved hospital problems. *       Problems and results from this hospitalization that need follow up. 1. Metastatic pancreatic cancer: Follow up with DR Janet Almanza  2. Right malleolar fracture:  Can follow up with Dr Alessandro Jaramillo if needed     Significant test results and incidental findings. CT head:  No acute intracranial abnormality  Xray foot right :  Slight soft tissue swelling question in the forefoot.  No acute bony   abnormality.  Bones are osteoporotic. Xray Ankle:  Minimally displaced fracture of the lateral malleolus. Possible very small coexistent fracture the periarticular anterior tibia,   versus artifact from overlying shadows.              Invasive procedures and treatments. [facility] Course. The patient was admitted from the oncology office due to abnormal electrolytes from chemo induced diarrhea. He was admitted and treated for the followin. Toxic enterocolitis-  Secondary to chemotherapy. Stool studies were  unremarkable. He was treated with  Octreotide and lomotil with good results.   He had flex sig last week which just revealed fissures. 2. Metabolic acidosis:  Resolved at the time of d/c   3.  hypokalemia:  K was stable for 3 days prior to his d/c home. ( pt was also taking lasix prior to admission,  This was NOT continued at d/c)   Will need follow up labs with oncology   4. Hypomagnesemia: improved with replacement   5. Severe oral candidiasis/mucositis: this was resolved at the time of d/c    6. Pt was not to have multifocal atrial tachycardia and was followed by cardiology and was placed on dig and low dose Cardizem. No need for anticoagulation. ( cardiology did not feel pt at AF)   7. R malleolar fracture: This likely occurred during one of his nocturnal falls at home. he was evaluated by ortho and this will be treated non surgically with WBAT. Pt has a walker at home . He refused home PT /OT and any skilled visits   8. T2DM:  pt was treated with basal bolus therapy during his stay   9. Syncope, recurrent falls-likely orthostatic hypotension from profuse diarrhea. this was resolved at the time of his discharge home. He reported that his daughters will be staying with him at [facility]      Metastatic pancreatic cancer:  S/p surgery and chemo. Pt wishes to continue with chemo per Dr Monica Butler. Consults. IP CONSULT TO ORTHOPEDIC SURGERY  IP CONSULT TO ONCOLOGY  IP CONSULT TO CARDIOLOGY    Physical examination on discharge day. /72   Pulse 88   Temp 99.2 °F (37.3 °C) (Oral)   Resp 16   Ht 5' 8\" (1.727 m)   Wt 184 lb (83.5 kg)   SpO2 92%   BMI 27.98 kg/m²   General appearance. Alert. Looks comfortable but chronically ill , color is pale and waxy   HEENT. Sclera clear. Moist mucus membranes. Cardiovascular. Regular rate and rhythm, normal S1, S2. No murmur. Respiratory. Not using accessory muscles. Clear to auscultation bilaterally, no wheeze. Gastrointestinal. Abdomen soft, non-tender, not distended, normal bowel sounds  Neurology. Facial symmetry. No speech deficits. Moving all extremities equally. Extremities. as: KLOR-CON M  TAKE 1 TABLET BY MOUTH  TWICE DAILY     tamsulosin 0.4 MG capsule  Commonly known as: FLOMAX  Take 1 capsule by mouth 2 times daily        STOP taking these medications    furosemide 40 MG tablet  Commonly known as: LASIX     loperamide 1 MG/5ML solution  Commonly known as: IMODIUM     loperamide 2 MG capsule  Commonly known as: IMODIUM           Where to Get Your Medications      These medications were sent to [address] [place] [address] Madison Yarbrough [phone] Cori Lightning [phone]   [facility] [address]    Hours: 24-hours Phone: [phone]   · digoxin 125 MCG tablet  · dilTIAZem 60 MG extended release capsule     You can get these medications from any pharmacy    Bring a paper prescription for each of these medications  · diphenoxylate-atropine 2.5-0.025 MG per tablet  · HYDROcodone-acetaminophen 5-325 MG per tablet         Discharge recommendations given to patient. Follow Up. in 1 week   Disposition. Home   Activity. As tolerated   Diet: DIET CARB CONTROL; Dietary Nutrition Supplements: Diabetic Oral Supplement      Spent > 30  minutes in discharge process.     Signed:  SAUNDRA Moore CNP     1/5/2021 1:47 PM

## 2021-01-05 NOTE — PROGRESS NOTES
Oncology and Hematology Care   Progress Note      2021 9:13 AM        Name: Whit Carlos . Admitted: 2020    SUBJECTIVE:  Feeling good. No new complaints. Ready to discharge home.      Reviewed interval ancillary notes    Current Medications      insulin glargine (LANTUS;BASAGLAR) injection pen 18 Units, QAM      potassium chloride (KLOR-CON M) extended release tablet 40 mEq, BID WC      benzocaine-menthol (CEPACOL SORE THROAT) lozenge 1 lozenge, Q2H PRN      diphenoxylate-atropine (LOMOTIL) 2.5-0.025 MG per tablet 1 tablet, 4x Daily      potassium chloride (KLOR-CON M) extended release tablet 40 mEq, PRN    Or      potassium bicarb-citric acid (EFFER-K) effervescent tablet 40 mEq, PRN    Or      potassium chloride 10 mEq/100 mL IVPB (Peripheral Line), PRN      digoxin (LANOXIN) tablet 125 mcg, Daily      dilTIAZem (CARDIZEM 12 HR) extended release capsule 60 mg, BID      magic (miracle) mouthwash with nystatin, 4x Daily PRN      perflutren lipid microspheres (DEFINITY) injection 1.65 mg, ONCE PRN      fluconazole (DIFLUCAN) in 0.9 % sodium chloride IVPB 200 mg, Q24H      octreotide (SANDOSTATIN) injection 100 mcg, TID PRN      HYDROmorphone HCl PF (DILAUDID) injection 0.5 mg, Q30 Min PRN      HYDROmorphone HCl PF (DILAUDID) injection 0.5 mg, Q4H PRN      HYDROcodone-acetaminophen (NORCO) 5-325 MG per tablet 1 tablet, Q4H PRN      tamsulosin (FLOMAX) capsule 0.4 mg, BID      glucose (GLUTOSE) 40 % oral gel 15 g, PRN      dextrose 50 % IV solution, PRN      glucagon (rDNA) injection 1 mg, PRN      dextrose 5 % solution, PRN      sodium chloride flush 0.9 % injection 10 mL, 2 times per day      sodium chloride flush 0.9 % injection 10 mL, PRN      enoxaparin (LOVENOX) injection 40 mg, Daily      promethazine (PHENERGAN) tablet 12.5 mg, Q6H PRN    Or      ondansetron (ZOFRAN) injection 4 mg, Q6H PRN      polyethylene glycol (GLYCOLAX) packet 17 g, Daily PRN     Multifocal atrial tachycardia (HCC)    Hyponatremia  Resolved Problems:    * No resolved hospital problems. 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 Improving. Mucositis and likely some oropharyngeal candidiasis. Improving. Weight loss. Dehydration. Leukopenia and anemia due to chemotherapy. Hypokalemia and hypomagnesemia. Improving. Metabolic acidosis. Diabetes. Metastatic pancreatic carcinoma.  Treated with surgery and chemotherapy.  Had gemcitabine and abraxane.  Recently started on Onivyde. Minimally displaced fracture of the lateral malleolus.        Reviewed his labs. Counts are stable. Okay for discharge from oncology standpoint. Follow up with Dr. Jono Piedra next week at the [facility] office.      Mattie Banuelos, [address] [facility]  Oncology Hematology [address] [facility].  [phone]    Patient stable wants to go home will follow up with dr Navdeep Wills as outpatient

## 2021-01-05 NOTE — PROGRESS NOTES
Physical Therapy  Justin Pena  Patient refused therapy this morning. Stated \"I am going home after sometime after 5pm and want to save my energy. \" \"\"I am cold and want to stay in bed and remain comfortable. \" Reports 5/10 pain in R toe. RN aware and retrieving meds. Educated the patient on importance of wearing the boot while at home and benefits of therapy. Patient continued to decline treatment. Celso Acuña RN notified. Jeff Alas, PTA [id number]  Concepc[place] Sol PT, DPT [id number]  PT agrees with above note.

## 2021-01-05 NOTE — PROGRESS NOTES
CLINICAL PHARMACY NOTE: MEDS TO [address] Select Patient?: Yes  Total # of Prescriptions Filled: 3   The following medications were delivered to the patient:  · Lomotil  · norco  · Magic mouthwash  Total # of Interventions Completed: 0  Time Spent (min): 45    Additional Documentation:  Delivered scripts to patient's daughter
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
## 2021-01-06 NOTE — CARE COORDINATION
[facility] Transitions Initial Follow Up Call    Call within 2 business days of discharge: Yes    Patient: Sue Botello Patient : 1960   MRN: [id number]  Reason for Admission:   Discharge Date: 21 RARS: Readmission Risk Score: 21      Last Discharge [facility]       Complaint Diagnosis Description Type Department Provider    20 Fatigue Hypokalemia . .. ED to Hosp-Admission (Discharged) (ADMITTED) Elo Valencia MD; Venessa Hammans. ..            Initial 24 hr call attempted, contact info left on vm      Follow Up  Future Appointments   Date Time Provider Kyleigh Jacobson   2021  1:30 PM Olga Lidia Limon MD [facility]   2021 11:00 AM SAUNDRA Brown - CNP FF Cardio [facility]       Luz Coronel RN

## 2021-01-07 NOTE — CARE COORDINATION
[facility] Transitions Initial Follow Up Call    Call within 2 business days of discharge: Yes    Patient: Jennifer Zavala Patient : 1960   MRN: [id number]  Reason for Admission:   Discharge Date: 21 RARS: Readmission Risk Score: 21      Last Discharge [facility]       Complaint Diagnosis Description Type Department Provider    20 Fatigue Hypokalemia . .. ED to Hosp-Admission (Discharged) (ADMITTED) Victoria Larsen MD; Evelyn Cintron. ..           2nd attempt at an initial 24 hour call, contact info left on vm      Follow Up  Future Appointments   Date Time Provider Kyleigh Jacobson   2021 11:15 AM SAUNDRA Davis NP [facility]   2021 11:00 AM SAUNDRA Acosta CNP FF Cardio [facility]       Poly De Santiago RN

## 2021-01-29 NOTE — PROGRESS NOTES
Patient Name: Rolando Posey  Medical Record Number: [id number]  YOB: 1960  Date of Encounter: 1/29/2021     Chief Complaint   Patient presents with    New Patient     right ankle - fell 3x 12.24.2020 due to loss of balance. \"increase pain side to side movement\" walking boot does help. \"states major pain in the back\" but understands he cant be seen for it today. History of Present Illness:  Rolando Posey is a 63 y.o. male here for evaluation of his right ankle fracture he sustained during a mechanical fall on 12/24/2020. His pain assessment is documented below and I reviewed this with him today. He was admitted to the hospital at that time and seen by our orthopedic nurse practitioner. He was fitted for a walking boot and given a walker. He was advised to weight-bear as tolerated. He presents today stating he only has pain with certain movements of the right ankle. He is still wearing his walking boot anytime he is out and about. He is using his walker at today's visit. Pain Assessment  Location of Pain: Ankle  Location Modifiers: Right, Lateral  Severity of Pain: 3  Quality of Pain: Sharp  Duration of Pain: Persistent  Frequency of Pain: Intermittent  Date Pain First Started: 12/24/20  Aggravating Factors: Other (Comment)(moving it left to right)  Limiting Behavior: Some  Relieving Factors:  Other (Comment), Rest(walking boot, elevation)  Result of Injury: Yes(fell due to lost of balance)  Work-Related Injury: No  Are there other pain locations you wish to document?: No    Past Medical History:   Diagnosis Date    Allergic rhinitis     [address] [address]     pancreatic    Gout     Hypertension     resolved, no medications    Prolonged emergence from general anesthesia     slow to wake, w port insertion woke up during    Type II or unspecified type diabetes mellitus without mention of complication, not stated as uncontrolled        Past Surgical History: Procedure Laterality Date    CYSTOSCOPY  06/19/2018    cysto, green light laser of prostate    ENDOSCOPY, COLON, DIAGNOSTIC      KNEE ARTHROSCOPY Left     OTHER SURGICAL HISTORY  02/26/2018    WHIPPLE and CHOLECYSECTOMY - Dr. Henna Rosa Right 11/2/2020    REMOVAL OF PORT- A -CATHETER performed by David Adams MD at [address] Left 11/2/2020    POWER PORT-A-CATHETER [address] [facility]    ([id number]) performed by David Adams MD at [address] Right 2012    rotator cuff repair    SIGMOIDOSCOPY N/A 12/22/2020    SIGMOIDOSCOPY DIAGNOSTIC FLEXIBLE performed by Cruz Oquendo MD at [address] [facility]  02/21/2018       Current Outpatient Medications   Medication Sig Dispense Refill    morphine (MS CONTIN) 15 MG extended release tablet       HYDROcodone-acetaminophen (NORCO) 5-325 MG per tablet acetaminophen 325 MG / hydrocodone bitartrate 5 MG Oral Tablet     take 1 tablet by mouth every 6 hours PRN   Active      dilTIAZem (CARDIZEM 12 HR) 60 MG extended release capsule Take 1 capsule by mouth 2 times daily 60 capsule 3    digoxin (LANOXIN) 125 MCG tablet Take 1 tablet by mouth daily 30 tablet 3    cyclobenzaprine (FLEXERIL) 5 MG tablet TAKE 1 TABLET BY MOUTH 3  TIMES DAILY AS NEEDED FOR  MUSCLE SPASMS 270 tablet 0    HUMALOG KWIKPEN 100 UNIT/ML SOPN INJECT 6 UNITS  SUBCUTANEOUSLY 3 TIMES  DAILY (BEFORE MEALS) (Patient taking differently: Using a sliding scale on this) 15 mL 3    potassium chloride (KLOR-CON M) 20 MEQ extended release tablet TAKE 1 TABLET BY MOUTH  TWICE DAILY 180 tablet 3    tamsulosin (FLOMAX) 0.4 MG capsule Take 1 capsule by mouth 2 times daily 180 capsule 1    allopurinol (ZYLOPRIM) 300 MG tablet TAKE 1 TABLET BY MOUTH EVERY DAY 90 tablet 1  insulin glargine (LANTUS;BASAGLAR) 100 UNIT/ML injection pen Inject 28 Units into the skin every morning (Patient taking differently: Inject 33 Units into the skin every morning ) 8 pen 3    ibuprofen (ADVIL;MOTRIN) 800 MG tablet TAKE 1 TABLET BY MOUTH 3 TIMES DAILY WITH MEALS 270 tablet 0    Blood Glucose Monitoring Suppl (FREESTYLE LITE) KENTRELL 1 Device by Does not apply route 3 times daily 1 Device 0    blood glucose test strips (FREESTYLE LITE) strip 1 each by In Vitro route 3 times daily 300 each 3    FreeStyle Lancets MISC 1 each by Does not apply route daily 100 each 3    Insulin Pen Needle 32G X 4 MM MISC 1 each by Does not apply route 4 times daily 200 each 3    aspirin EC 81 MG EC tablet Take 1 tablet by mouth daily 30 tablet 11     No current facility-administered medications for this visit. Allergies, social and family histories were reviewed and updated as appropriate. Review of Systems:  Relevant review of systems reviewed and available in the patient's chart under the 'MEDIA' tab. Vital Signs:  Temp 97.2 °F (36.2 °C) (Infrared)   Ht 5' 8\" (1.727 m)   Wt 200 lb (90.7 kg)   BMI 30.41 kg/m²     General Exam:   Constitutional: Patient is adequately groomed with no evidence of malnutrition  Mental Status: The patient is oriented to time, place and person. The patient's mood and affect are appropriate. Lymphatic: The lymphatic examination bilaterally reveals all areas to be without enlargement or induration. Neurological: The patient has good coordination and balance. There is no focal weakness or sensory deficit.     Right Ankle Examination: Inspection: Normal ankle and foot alignment. Normal muscle contours and no significant limb length discrepancy. No gross atrophy in any particular myotome. Patient has moderate bilateral lower extremity edema including his ankles and feet. There are no abrasions, lacerations, contusions, hematomas or ecchymosis. There is no erythema, induration or warmth to suggest an infectious process. Palpation: Patient denies tenderness on palpation over the lateral malleolus. Ankle Range of Motion:    Plantarflexion: 40°   Dorsiflexion: 15°   Inversion: 25°   Eversion: 15°    Skin: There are no rashes, ulcerations or lesions. Sensation to light touch intact. Circulation: The limb is warm and well perfused. Distal pulses intact. Capillary refill is intact. Edema: moderate. Gait: Patient has a antalgic gait and is taking short strides. Radiology:     X-rays obtained and reviewed in office:  Views: 3 view right ankle including AP, lateral and oblique  Impression: There is a stable, healing fracture of the distal fibula. Ankle mortise is intact without widening. No widening at the syndesmosis. There are no lytic or blastic lesions. Orders:  Orders Placed This Encounter   Procedures    XR ANKLE RIGHT (MIN 3 VIEWS)     3V Rt Ankle     Standing Status:   Future     Number of Occurrences:   1     Standing Expiration Date:   2/28/2021     Scheduling Instructions:      Room 5       wb     Order Specific Question:   Reason for exam:     Answer: Ankle Pain       Impression:  Encounter Diagnosis   Name Primary?     Other closed fracture of distal end of right fibula with routine healing, subsequent encounter Yes       Treatment Plan: Patient is 5 weeks out from his initial injury. He reports minimal pain at this time. X-rays are stable. He is advised to continue wearing his walking boot for the next several weeks when out and about. He can start weaning out of the boot when at home. He will continue using his walker as needed. Patient is undergoing treatment for metastatic pancreatic cancer. I do not feel physical therapy is warranted at this time. He is advised to follow back in 5 weeks or before that time with any concerns. Advised patient if he is not having any pain and back to all normal activities in 5 weeks he will not require a scheduled follow-up visit. Mark Bruce was informed of the results of any imaging. We discussed treatment options and a time was given to answer questions. A plan was proposed and Mark Bruce understand and accepts this course of care. Electronically signed by Sven Fraser PA-C on 2/94/3290  Board Certified [facility]    Please note that portions of this note were completed with a voice recognition program.  Efforts were made to edit the dictations but occasionally words are mis-transcribed.

## 2021-02-23 NOTE — PROGRESS NOTES
stroke and thromboembolism.   ~ Arrhythmia felt to be MAT and secondary to acute illness and he has significant pancytopenia and GI bleeding. The decision was made to no use long-term AC at that time. - Afib risk factors including age, HTN, obesity, inactivity and CASANDRA were discussed with patient. Risk factor modification recommended   ~ TSH 0.53 (12/31/2020)     - Discussed possible atrial arrhythmia, including atrial fibrillation including risk for stroke, however he is not interested in cardiac monitoring at this time. He is not likely a candidate and is not interested in using [facility] even if he is found to have afib. He had recent echo that showed normal LV function. His tachycardia is controlled with diltiazem and digoxin. He will continue with this and if he is feeling better and more stable from oncology perspective at follow up he may consider Holter monitoring. He is monitoring his heart rate on his smart watch. HTN-goal <130/80   - Controlled   - BP has been soft, tolerated diltiazem for rate control   - Encouraged patient to check BP at home, log and bring to office visits  Pancreatic CA   - Follow with oncology   - Currently stable on long-term chemotherapy  Pancytopenia   - Chronic   - Follows with oncology   Plan  - Continue current medications  - Monitor blood pressure and symptoms; call office if you develop dizziness or SBP <90    F/U: Follow-up with EP in 4-5 months  -Follow up with device clinic as scheduled  -Call [address] at [phone] with any questions    Allergies:  No Known Allergies  Home Medications:  Prior to Visit Medications    Medication Sig Taking?  Authorizing Provider   morphine (MS CONTIN) 15 MG extended release tablet   Historical Provider, MD   HYDROcodone-acetaminophen (NORCO) 5-325 MG per tablet acetaminophen 325 MG / hydrocodone bitartrate 5 MG Oral Tablet     take 1 tablet by mouth every 6 hours PRN   Active  Historical Provider, MD   dilTIAZem (CARDIZEM 12 HR) 60 MG extended release capsule Take 1 capsule by mouth 2 times daily  SAUNDRA Hare CNP   digoxin (LANOXIN) 125 MCG tablet Take 1 tablet by mouth daily  SAUNDRA Hare CNP   cyclobenzaprine (FLEXERIL) 5 MG tablet TAKE 1 TABLET BY MOUTH 3  TIMES DAILY AS NEEDED FOR  MUSCLE SPASMS  Mary Lou Herrera MD   HUMALOG KWIKPEN 100 UNIT/ML SOPN INJECT 6 UNITS  SUBCUTANEOUSLY 3 TIMES  DAILY (BEFORE MEALS)  Patient taking differently: Using a sliding scale on this  Mary Lou Herrera MD   potassium chloride (KLOR-CON M) 20 MEQ extended release tablet TAKE 1 TABLET BY MOUTH  TWICE DAILY  Mary Lou Herrera MD   tamsulosin (FLOMAX) 0.4 MG capsule Take 1 capsule by mouth 2 times daily  Earnest Kim MD   allopurinol (ZYLOPRIM) 300 MG tablet TAKE 1 TABLET BY MOUTH EVERY DAY  Earnest Kim MD   insulin glargine (LANTUS;BASAGLAR) 100 UNIT/ML injection pen Inject 28 Units into the skin every morning  Patient taking differently: Inject 33 Units into the skin every morning   Earnest Kim MD   ibuprofen (ADVIL;MOTRIN) 800 MG tablet TAKE 1 TABLET BY MOUTH 3 TIMES DAILY WITH MEALS  Earnest Kim MD   Blood Glucose Monitoring Suppl (FREESTYLE LITE) KENTRELL 1 Device by Does not apply route 3 times daily  Earnest Kim MD   blood glucose test strips (FREESTYLE LITE) strip 1 each by In Vitro route 3 times daily  Earnest Kim MD   FreeStyle Lancets MISC 1 each by Does not apply route daily  Earnest Kim MD   Insulin Pen Needle 32G X 4 MM MISC 1 each by Does not apply route 4 times daily  Earnest Kim MD   aspirin EC 81 MG EC tablet Take 1 tablet by mouth daily  Earnest Kim MD      Past Medical History:  Past Medical History:   Diagnosis Date    Allergic rhinitis     Cancer ([facility] [address]     pancreatic    Gout     Hypertension     resolved, no medications    Prolonged emergence from general anesthesia     slow to wake, w port insertion woke up during    Type II or unspecified type diabetes mellitus without mention of complication, not stated as uncontrolled      Past Surgical History:    has a past surgical history that includes Knee arthroscopy (Left); Shoulder Arthroplasty (Right, 2012); Upper gastrointestinal endoscopy (02/21/2018); other surgical history (02/26/2018); Pancreas surgery; Cystocopy (06/19/2018); Endoscopy, colon, diagnostic; Port Surgery (Right, 11/2/2020); Port Surgery (Left, 11/2/2020); and sigmoidoscopy (N/A, 12/22/2020). Social History:  Reviewed. reports that he has never smoked. He has never used smokeless tobacco. He reports that he does not drink alcohol or use drugs. Family History:  Reviewed. family history includes Cancer in his father; Diabetes in his maternal aunt, paternal grandfather, and paternal grandmother; Heart Disease in his father; High Blood Pressure in his father. Denies family history of sudden cardiac death, arrhythmia, premature CAD    Review of System:  · Constitutional: No weight changes or weakness  · HEENT: No visual changes. No mouth sores or sore throat. · Cardiovascular: denies chest pain, denies dyspnea on exertion, denies palpitations or denies loss of consciousness. No cough, hemoptysis, denies pleuritic pain, or phlebitis. denies dizziness. · Respiratory: denies cough or wheezing. · Gastrointestinal: Negative, No blood in stools. · Genitourinary: No hematuria. · Neurological: No focal weakness  · Psychiatric: No confusion, anxiety, or depression. · Hem/Lymph: Denies abnormal bruising or bleeding. Physical Examination:  There were no vitals filed for this visit. Wt Readings from Last 3 Encounters:   01/29/21 200 lb (90.7 kg)   12/29/20 184 lb (83.5 kg)   12/21/20 200 lb (90.7 kg)      Constitutional: Cooperative and in no apparent distress, and appears well nourished   Skin: Warm and pink; no cyanosis or bruising   HEENT: Symmetric and normocephalic. Conjunctiva pink with clear sclera.  Mucus membranes pink and moist. No visible masses/goiter   Respiratory: Respirations symmetric and unlabored. Lungs clear to auscultation bilaterally, no wheezing, rhonchi, or crackles.  Cardiovascular:  regular rate and rhythm. S1 & S2 present, negative for murmur. negative elevation of JVP. No peripheral edema.  Musculoskeletal:  No focal weakness.  Neurological/Psych: Awake and orientated to person, place and time. Calm affect, appropriate mood. Pertinent labs, diagnostic, device, and imaging results reviewed as a part of this visit    LABS    CBC:   Lab Results   Component Value Date    WBC 3.6 (L) 2021    HGB 8.0 (L) 2021    HCT 25.0 (L) 2021    MCV 78.2 (L) 2021     2021     BMP:   Lab Results   Component Value Date    CREATININE 0.8 2021    BUN 5 (L) 2021     2021    K 3.7 2021     2021    CO2 21 2021     CrCl cannot be calculated (Unknown ideal weight.).    No results found for: BNP    Thyroid:   Lab Results   Component Value Date    TSH 2.40 09/10/2020     Lipid Panel:   Lab Results   Component Value Date    CHOL 186 2017    HDL 41 2017    HDL 42 10/08/2011    TRIG 187 2018     LFTs:  Lab Results   Component Value Date    ALT 16 2020    AST 18 2020    ALKPHOS 148 (H) 2020    BILITOT 0.4 2020     Coags:   Lab Results   Component Value Date    PROTIME 16.2 (H) 2020    INR 1.39 (H) 2020    APTT 30.7 2020     EC2021 ST , , QRS 90, QTc 392    ECHO: 2020   Summary   *Left ventricle - normal size, thickness and function with EF of 60%   *Mitral valve - trivial regurgitation   *Aortic valve - sclerotic   *Tricuspid valve - trivial regurgitation    Stress Test: none    Cath: none    Diet & Exercise:   The patient is counseled to follow a low salt diet to assure blood pressure remains controlled for cardiovascular risk factor modification   The patient is counseled to avoid excess caffeine, and energy drinks as this may exacerbated ectopy and arrhythmia   The patient is counseled to lose weight to control cardiovascular risk factors   Exercise program discussed: To improve overall cardiovascular health, the patient is instructed to increase cardiovascular related activities with a goal of 150 min/week of moderate level activity or 10,000 steps per day. Encouraged to perform as much activity as tolerated     I have addressed the patient's cardiac risk factors and adjusted pharmacologic treatment as needed. In addition, I have reinforced the need for patient directed risk factor modification. I independently reviewed the ECG    All questions and concerns were addressed with the patient. Alternatives to treatment were discussed. Thank you for allowing to us to participate in the care of [address] [place] [address].     Guillermo Verma, SAUNDRA-CNP  [facility]   Office: [phone]

## 2021-02-24 NOTE — PATIENT INSTRUCTIONS
- Continue current medicaitons  - Check your blood pressure at home, if your top number blood pressure is >140/90 or <90/50 please call the office   - Call office if symptoms worsen  - Check your heart rate at home, if it is <50 or >110 at rest please call the office   - Follow up in 4-6 months

## 2021-03-19 PROBLEM — R50.81 NEUTROPENIC FEVER (HCC): Status: ACTIVE | Noted: 2021-01-01

## 2021-03-19 PROBLEM — C25.9 PANCREATIC CANCER (HCC): Status: ACTIVE | Noted: 2021-01-01

## 2021-03-19 PROBLEM — D70.9 NEUTROPENIC FEVER (HCC): Status: ACTIVE | Noted: 2021-01-01

## 2021-03-19 NOTE — ED PROVIDER NOTES
I independently performed a history and physical on [address] [place] [address]. All diagnostic, treatment, and disposition decisions were made by myself in conjunction with the advanced practice provider. Briefly, this is a 63 y.o. male here for fever, known neutropenia, c/o sore throat, fatigue, myalgia. Concern for neutropenic fever. On exam, Heart rate 110, reg rhythm  Lungs CTAB  Abdomen soft, NT, ND  Oropharynx with thrush. EKG  EKG reviewed by myself. Dated today 2326. Rate 108. Sinus tach. Flat T waves. No change from prior. Screenings                   MDM  Neutropenic fever/sepsis workups. Emperic ABX. Patient Referrals:  No follow-up provider specified. Discharge Medications:  Current Discharge Medication List          FINAL IMPRESSION  1. Neutropenic fever (HCC)        Blood pressure 118/80, pulse 101, temperature 98.5 °F (36.9 °C), resp. rate 20, height 5' 8\" (1.727 m), weight 186 lb (84.4 kg), SpO2 97 %. For further details of [address] [place] [address] emergency department encounter, please see documentation by advanced practice provider, Monie Guzmán.        Ginette Lopez MD  03/19/21 [id number]

## 2021-03-19 NOTE — PROGRESS NOTES
4 Eyes Skin Assessment     The patient is being assess for  Admission    I agree that 2 RN's have performed a thorough Head to Toe Skin Assessment on the patient. ALL assessment sites listed below have been assessed. Areas assessed by both nurses:   [x]   Head, Face, and Ears   [x]   Shoulders, Back, and Chest  [x]   Arms, Elbows, and Hands   [x]   Coccyx, Sacrum, and IschIum  [x]   Legs, Feet, and Heels        Does the Patient have Skin Breakdown?   No         Rodney Prevention initiated:  NA   Wound Care Orders initiated:  NA      [facility] nurse consulted for Pressure Injury (Stage 3,4, Unstageable, DTI, NWPT, and Complex wounds), New and Established Ostomies:  NA      Nurse 1 eSignature: Electronically signed by Cecelia Newell RN on 3/19/21 at 5:26 AM EDT    **SHARE this note so that the co-signing nurse is able to place an eSignature**    Nurse 2 eSignature: Electronically signed by Jensen Marrero RN on 3/19/21 at 5:57 AM EDT
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
## 2021-03-19 NOTE — H&P
Hospitalist History and Physical       Ti Cortes is a 63 y.o. male   1960         Family Physician:    Chief Complaint:  Neutropenic fever    History of Present Illness:   Patient with history of pancreatic cancer on chemotherapy, hypertension, diabetes, patient was sent to the emergency room because of abnormal lab of neutropenia white count 0.03 patient also had fever complain of some sore throat and fatigue in the emergency room temperature was 98.5 and patient is admitted for neutropenic fever has some diarrhea no productive cough chest x-ray was unremarkable no pneumonia      Past Medical History:   Diagnosis Date    Allergic rhinitis     Cancer ([facility] [address]     pancreatic    Gout     Hypertension     resolved, no medications    Prolonged emergence from general anesthesia     slow to wake, w port insertion woke up during    Type II or unspecified type diabetes mellitus without mention of complication, not stated as uncontrolled         Family History   Problem Relation Age of Onset    High Blood Pressure Father     Heart Disease Father         atrial fibrillation    Cancer Father         colon    Diabetes Maternal Aunt     Diabetes Paternal Grandmother     Diabetes Paternal Grandfather         Social History     Socioeconomic History    Marital status:      Spouse name: Not on file    Number of children: Not on file    Years of education: Not on file    Highest education level: Not on file   Occupational History    Not on file   Social Needs    Financial resource strain: Not on file    Food insecurity     Worry: Not on file     Inability: Not on file    Transportation needs     Medical: Not on file     Non-medical: Not on file   Tobacco Use    Smoking status: Never Smoker    Smokeless tobacco: Never Used   Substance and Sexual Activity    Alcohol use: No    Drug use: No    Sexual activity: Not on file   Lifestyle    Physical activity     Days per week: Not on file Minutes per session: Not on file    Stress: Not on file   Relationships    Social connections     Talks on phone: Not on file     Gets together: Not on file     Attends Taoist service: Not on file     Active member of club or organization: Not on file     Attends meetings of clubs or organizations: Not on file     Relationship status: Not on file    Intimate partner violence     Fear of current or ex partner: Not on file     Emotionally abused: Not on file     Physically abused: Not on file     Forced sexual activity: Not on file   Other Topics Concern    Not on file   Social History Narrative    Not on file        No Known Allergies     Current Facility-Administered Medications   Medication Dose Route Frequency Provider Last Rate Last Admin    sodium chloride flush 0.9 % injection 10 mL  10 mL Intravenous 2 times per day Cathleen Garcia MD        sodium chloride flush 0.9 % injection 10 mL  10 mL Intravenous PRN Cathleen Garcia MD   10 mL at 03/19/21 0854    promethazine (PHENERGAN) tablet 12.5 mg  12.5 mg Oral Q6H PRN Cathleen Garcia MD        Or    ondansetron (ZOFRAN) injection 4 mg  4 mg Intravenous Q6H PRN Cathleen Garcia MD        polyethylene glycol (GLYCOLAX) packet 17 g  17 g Oral Daily PRN Cathleen Garcia MD        acetaminophen (TYLENOL) tablet 650 mg  650 mg Oral Q6H PRN Cathleen Garcia MD   650 mg at 03/19/21 0601    Or    acetaminophen (TYLENOL) suppository 650 mg  650 mg Rectal Q6H PRN Cathleen Garcia MD        0.9 % sodium chloride infusion   Intravenous PRN Jenn Blank MD        glucose (GLUTOSE) 40 % oral gel 15 g  15 g Oral PRN Jenn Blank MD        dextrose 50 % IV solution  12.5 g Intravenous PRN Erik Knight MD        glucagon (rDNA) injection 1 mg  1 mg Intramuscular PRN Erik Knight MD        dextrose 5 % solution  100 mL/hr Intravenous PRN Erik Knight MD        insulin glargine (LANTUS;BASAGLAR) injection pen 15 Units  15 Units Subcutaneous Nightly Lizette Cervantes MD        insulin lispro (1 Unit Dial) 0-6 Units  0-6 Units Subcutaneous TID WC Lizette Cervantes MD   1 Units at 03/19/21 1024    insulin lispro (1 Unit Dial) 0-3 Units  0-3 Units Subcutaneous Nightly Lizette Cervantes MD        cefepime (MAXIPIME) 2000 mg IVPB minibag  2,000 mg Intravenous Q8H Lizette Cervantes  mL/hr at 03/19/21 1052 2,000 mg at 03/19/21 1052    sodium chloride flush 0.9 % injection 10 mL  10 mL Intravenous 2 times per day Lizette Cervantes MD        sodium chloride flush 0.9 % injection 10 mL  10 mL Intravenous PRN Lizette Cervantes MD        enoxaparin (LOVENOX) injection 40 mg  40 mg Subcutaneous Daily Jenn Leon MD        promethazine (PHENERGAN) tablet 12.5 mg  12.5 mg Oral Q6H PRN Lizette Cervantes MD        Or    ondansetron (ZOFRAN) injection 4 mg  4 mg Intravenous Q6H PRN Jenn Leon MD        polyethylene glycol (GLYCOLAX) packet 17 g  17 g Oral Daily PRN Lizette Cervantes MD        acetaminophen (TYLENOL) tablet 650 mg  650 mg Oral Q6H PRN Lizette Cervantes MD        Or   [place] acetaminophen (TYLENOL) suppository 650 mg  650 mg Rectal Q6H PRN Lizette Cervantes MD        potassium chloride 10 mEq/100 mL IVPB (Peripheral Line)  10 mEq Intravenous PRN Lizette Cervantes MD        magnesium sulfate 2000 mg in 50 mL IVPB premix  2,000 mg Intravenous PRN Lizette Cervantes MD            Review of Systems:  General appearance: alert, appears stated age and cooperative  Skin: Skin color, texture, normal. No rashes or lesions  HEENT: No nose bleed, headache, vision problems  CV: C/O chest pain, tightness, pressure,   Respiratory: C/o no SOB, DONOHUE, Orthopnea, PND  GI: No abdominal pain, black stool, bloating  Limbs: No c/o edema, pain, swelling, intermittent claudication, joint pains  Neuro: No dizziness, lightheadedness, syncope, gait problems, memory problems  Psych: grossly normal. No SI/depression. Vitals:   Blood pressure 103/69, pulse 81, temperature 98.3 °F (36.8 °C), temperature source Oral, resp. rate 16, height 5' 8\" (1.727 m), weight 187 lb 8 oz (85 kg), SpO2 99 %.     Physical Exam:    HEENT: AT, NC, PERRLA  Neck: No JVD, supple, Thyromegaly , Corotid Bruit  Heart: S1 S2 audible, no murmur   Lungs: CTA   Abdomen: Soft,  Nontender , Bowel Sound: positive  Limbs: No edema   CNS: no focal deficit      Labs:  CBC with Differential:    Lab Results   Component Value Date    WBC 0.3 03/19/2021    RBC 2.79 03/19/2021    HGB 6.9 03/19/2021    HCT 22.5 03/19/2021    PLT 22 03/19/2021    MCV 80.5 03/19/2021    MCH 24.9 03/19/2021    MCHC 30.9 03/19/2021    RDW 20.3 03/19/2021    BANDSPCT 1 01/03/2021    METASPCT 1 01/03/2021    LYMPHOPCT 9.9 01/04/2021    MONOPCT 9.8 01/04/2021    MYELOPCT 1 01/03/2021    BASOPCT 0.5 01/04/2021    MONOSABS 0.3 01/04/2021    LYMPHSABS 0.4 01/04/2021    EOSABS 0.0 01/04/2021    BASOSABS 0.0 01/04/2021     CMP:    Lab Results   Component Value Date     03/19/2021    K 4.2 03/19/2021    K 3.6 01/03/2021     03/19/2021    CO2 25 03/19/2021    BUN 6 03/19/2021    CREATININE 0.6 03/19/2021    GFRAA >60 03/19/2021    GFRAA >60 11/17/2012    AGRATIO 1.2 03/19/2021    LABGLOM >60 03/19/2021    GLUCOSE 212 03/19/2021    PROT 4.4 03/19/2021    PROT 7.2 10/13/2012    LABALBU 2.4 03/19/2021    CALCIUM 7.9 03/19/2021    BILITOT 0.5 03/19/2021    ALKPHOS 227 03/19/2021    AST 21 03/19/2021    ALT 32 03/19/2021     Hepatic Function Panel:    Lab Results   Component Value Date    ALKPHOS 227 03/19/2021    ALT 32 03/19/2021    AST 21 03/19/2021    PROT 4.4 03/19/2021    PROT 7.2 10/13/2012    BILITOT 0.5 03/19/2021    BILIDIR <0.2 12/29/2020    IBILI see below 12/29/2020    LABALBU 2.4 03/19/2021     Magnesium:    Lab Results   Component Value Date    MG 2.00 01/04/2021     PT/INR:    Lab Results   Component Value Date    PROTIME 16.2 12/29/2020    INR 1.39 12/29/2020 Last 3 Troponin:  No results found for: TROPONINI  U/A:    Lab Results   Component Value Date    COLORU YELLOW 03/19/2021    PHUR 6.5 03/19/2021    WBCUA 1 03/19/2021    RBCUA 1 03/19/2021    BACTERIA 4+ 05/20/2018    CLARITYU Clear 03/19/2021    SPECGRAV 1.017 03/19/2021    LEUKOCYTESUR Negative 03/19/2021    UROBILINOGEN 1.0 03/19/2021    BILIRUBINUR Negative 03/19/2021    BLOODU Negative 03/19/2021    GLUCOSEU 500 03/19/2021     ABG:    Lab Results   Component Value Date    PHART 7.279 02/26/2018    FDZ9BSJ 50.5 02/26/2018    PO2ART 65.4 02/26/2018    XEF9IRF 23.7 02/26/2018    BEART -3 02/26/2018    QLE7CDZ 25 02/26/2018    G7EFWCWC 90 02/26/2018     FLP:    Lab Results   Component Value Date    TRIG 187 02/22/2018    HDL 41 05/20/2017    HDL 42 10/08/2011    LDLCALC 113 05/20/2017    LABVLDL 32 05/20/2017     TSH:    Lab Results   Component Value Date    TSH 2.40 09/10/2020          DATA:   ECG: Sinus Rhythm       ASSESSMENT:   1 neutropenic fever, patient started on cefepime  We will consult infectious disease for input    2 pancytopenia secondary to chemotherapy  We will consult oncology for follow-up evaluation    3 hypertension    4 diabetes we will place on sliding scale    5 pancreatic cancer on chemotherapy  Oncology consulted  Plan:  Cultures, infectious disease and oncology consult    [facility]  3/19/2021  12:07 PM

## 2021-03-19 NOTE — ED PROVIDER NOTES
[address]        Pt Name: Chasity Last  MRN: [id number]  [place] 1960  Date of evaluation: 3/18/2021  Provider: SAUNDRA Mathew - CNP  PCP: Carmen Adorno MD     I have seen and evaluated this patient with my supervising physician Lisa Melgoza MD.    [address] [place] [address]       Chief Complaint   Patient presents with    Fatigue     pt with weakness and abnormal labs - seen in MD office and was given fluids. Pt with hx of pancreatic CA and on chemo       HISTORY OF PRESENT ILLNESS   (Location, Timing/Onset, Context/Setting, Quality, Duration, Modifying Factors, Severity, Associated Signs and Symptoms)  Note limiting factors. Chasity Last is a 63 y.o. male presents to the emergency department with complaint of abnormal labs, fatigue, fever. Patient does have history of pancreatic cancer and is on chemotherapy. He does see Dr. Ej Raymond at [facility], was told to come to the emergency department this evening as he had a fever greater than 101 and was neutropenic with last outpatient labs. Patient reports that he has no energy and is experiencing some body aches with chronic abdominal pain. He does appear ill. Accompanied by his daughter today in the ER. Denies any headache, fever, lightheadedness, dizziness, visual disturbances. No chest pain or pressure. No neck or back pain. No shortness of breath, cough, or congestion. No vomiting, diarrhea, constipation, or dysuria. No rash. Nursing Notes were all reviewed and agreed with or any disagreements were addressed in the HPI. REVIEW OF SYSTEMS    (2-9 systems for level 4, 10 or more for level 5)     Review of Systems   Constitutional: Positive for activity change, appetite change, chills, fatigue and fever. Respiratory: Negative for chest tightness and shortness of breath. Cardiovascular: Negative for chest pain.    Gastrointestinal: Positive for abdominal pain and nausea. Negative for diarrhea and vomiting. Genitourinary: Negative for dysuria. All other systems reviewed and are negative. Positives and Pertinent negatives as per HPI. Except as noted above in the ROS, all other systems were reviewed and negative.        PAST MEDICAL HISTORY     Past Medical History:   Diagnosis Date    Allergic rhinitis     Cancer ([facility] [address]     pancreatic    Gout     Hypertension     resolved, no medications    Prolonged emergence from general anesthesia     slow to wake, w port insertion woke up during    Type II or unspecified type diabetes mellitus without mention of complication, not stated as uncontrolled          SURGICAL HISTORY     Past Surgical History:   Procedure Laterality Date    CYSTOSCOPY  06/19/2018    cysto, green light laser of prostate    ENDOSCOPY, COLON, DIAGNOSTIC      KNEE ARTHROSCOPY Left     OTHER SURGICAL HISTORY  02/26/2018    WHIPPLE and CHOLECYSECTOMY - Dr. Lakia Leon Right 11/2/2020    REMOVAL OF PORT- A -CATHETER performed by Marta Corey MD at [address] [facility] [address] Left 11/2/2020    POWER PORT-A-CATHETER [address] [facility]    ([id number]) performed by Marta Corey MD at [address] Right 2012    rotator cuff repair    SIGMOIDOSCOPY N/A 12/22/2020    SIGMOIDOSCOPY DIAGNOSTIC FLEXIBLE performed by Araceli Lema MD at [address] [facility]  02/21/2018         [address]       Current Discharge Medication List      CONTINUE these medications which have NOT CHANGED    Details   cetirizine (ZYRTEC) 10 MG tablet Take 10 mg by mouth daily      digoxin (LANOXIN) 125 MCG tablet Take 1 tablet by mouth daily  Qty: 90 tablet, Refills: 1      dilTIAZem (CARDIZEM 12 HR) 60 MG extended release capsule Take 1 capsule by mouth 2 times daily  Qty: 180 capsule, Refills: 1      morphine (MS CONTIN) 15 MG extended release tablet HYDROcodone-acetaminophen (NORCO) 5-325 MG per tablet acetaminophen 325 MG / hydrocodone bitartrate 5 MG Oral Tablet     take 1 tablet by mouth every 6 hours PRN   Active      cyclobenzaprine (FLEXERIL) 5 MG tablet TAKE 1 TABLET BY MOUTH 3  TIMES DAILY AS NEEDED FOR  MUSCLE SPASMS  Qty: 270 tablet, Refills: 0      HUMALOG KWIKPEN 100 UNIT/ML SOPN INJECT 6 UNITS  SUBCUTANEOUSLY 3 TIMES  DAILY (BEFORE MEALS)  Qty: 15 mL, Refills: 3    Comments: Requesting 1 year supply      potassium chloride (KLOR-CON M) 20 MEQ extended release tablet TAKE 1 TABLET BY MOUTH  TWICE DAILY  Qty: 180 tablet, Refills: 3    Comments: Requesting 1 year supply      tamsulosin (FLOMAX) 0.4 MG capsule Take 1 capsule by mouth 2 times daily  Qty: 180 capsule, Refills: 1      allopurinol (ZYLOPRIM) 300 MG tablet TAKE 1 TABLET BY MOUTH EVERY DAY  Qty: 90 tablet, Refills: 1      insulin glargine (LANTUS;BASAGLAR) 100 UNIT/ML injection pen Inject 28 Units into the skin every morning  Qty: 8 pen, Refills: 3    Comments: (Lantus solostar)      aspirin EC 81 MG EC tablet Take 1 tablet by mouth daily  Qty: 30 tablet, Refills: 11      Blood Glucose Monitoring Suppl (FREESTYLE LITE) KENTRELL 1 Device by Does not apply route 3 times daily  Qty: 1 Device, Refills: 0    Associated Diagnoses: Type 2 diabetes mellitus with hyperglycemia, with long-term current use of insulin ([facility])      blood glucose test strips (FREESTYLE LITE) strip 1 each by In Vitro route 3 times daily  Qty: 300 each, Refills: 3    Associated Diagnoses: Type 2 diabetes mellitus with hyperglycemia, with long-term current use of insulin ([facility])      FreeStyle Lancets MISC 1 each by Does not apply route daily  Qty: 100 each, Refills: 3    Associated Diagnoses: Type 2 diabetes mellitus with hyperglycemia, with long-term current use of insulin ([facility])      Insulin Pen Needle 32G X 4 MM MISC 1 each by Does not apply route 4 times daily  Qty: 200 each, Refills: 3    Comments: Thelma needles  Associated Diagnoses: Type 2 diabetes mellitus with hyperglycemia, with long-term current use of insulin ([address]               ALLERGIES     Patient has no known allergies. FAMILYHISTORY       Family History   Problem Relation Age of Onset    High Blood Pressure Father     Heart Disease Father         atrial fibrillation    Cancer Father         colon    Diabetes Maternal Aunt     Diabetes Paternal Grandmother     Diabetes Paternal Grandfather           SOCIAL HISTORY       Social History     Tobacco Use    Smoking status: Never Smoker    Smokeless tobacco: Never Used   Substance Use Topics    Alcohol use: No    Drug use: No       SCREENINGS             PHYSICAL EXAM    (up to 7 for level 4, 8 or more for level 5)     ED Triage Vitals [03/18/21 2316]   BP Temp Temp src Pulse Resp SpO2 Height Weight   114/79 98.5 °F (36.9 °C) -- 116 20 100 % 5' 8\" (1.727 m) 186 lb (84.4 kg)       Physical Exam  Vitals signs and nursing note reviewed. Constitutional:       Appearance: He is well-developed. He is ill-appearing. He is not diaphoretic. HENT:      Head: Normocephalic and atraumatic. Right Ear: External ear normal.      Left Ear: External ear normal.   Eyes:      General:         Right eye: No discharge. Left eye: No discharge. Neck:      Musculoskeletal: Normal range of motion and neck supple. Vascular: No JVD. Cardiovascular:      Rate and Rhythm: Tachycardia present. Pulses: Normal pulses. Heart sounds: Normal heart sounds. Pulmonary:      Effort: Pulmonary effort is normal. No respiratory distress. Breath sounds: Normal breath sounds. Abdominal:      Palpations: Abdomen is soft. Musculoskeletal: Normal range of motion. Skin:     General: Skin is warm and dry. Coloration: Skin is not pale. Neurological:      Mental Status: He is alert and oriented to person, place, and time.    Psychiatric:         Behavior: Behavior normal.         DIAGNOSTIC RESULTS LABS:    Labs Reviewed   CBC WITH AUTO DIFFERENTIAL - Abnormal; Notable for the following components:       Result Value    WBC 0.2 (*)     RBC 3.01 (*)     Hemoglobin 7.4 (*)     Hematocrit 24.1 (*)     MCV 79.9 (*)     MCH 24.4 (*)     MCHC 30.6 (*)     RDW 19.7 (*)     Platelets 19 (*)     All other components within normal limits    Narrative:     Celia Santamaria  SFERF tel. [phone],  Hematology results called to and read back by RN Suzi Brittle, 03/19/2021  02:15, by [facility]  Hematology results called to and read back by RN Suzi Brittle, 03/19/2021  02:11, by [facility]  Performed at:  [facility] [address], [place], [facility] [place]   Phone [phone]   COMPREHENSIVE METABOLIC PANEL - Abnormal; Notable for the following components:    Sodium 133 (*)     Glucose 212 (*)     BUN 6 (*)     CREATININE 0.6 (*)     Calcium 7.9 (*)     Total Protein 4.4 (*)     Albumin 2.4 (*)     Alkaline Phosphatase 227 (*)     All other components within normal limits    Narrative:     Performed at:  [facility] [address] [facility], [facility] [place]   Phone [phone]   URINE RT REFLEX TO CULTURE - Abnormal; Notable for the following components:    Glucose, Ur 500 (*)     Ketones, Urine TRACE (*)     Protein, UA TRACE (*)     All other components within normal limits    Narrative:     Performed at:  [facility] [address], [place], [facility] [place]   Phone [phone]   CBC WITH AUTO DIFFERENTIAL - Abnormal; Notable for the following components:    WBC 0.3 (*)     RBC 2.79 (*)     Hemoglobin 6.9 (*)     Hematocrit 22.5 (*)     MCH 24.9 (*)     MCHC 30.9 (*)     RDW 20.3 (*)     Platelets 22 (*)     All other components within normal limits    Narrative:     Celia Santamaria  SFF5T tel. [phone],  Hematology results called to and read back by Mini Corado, 03/19/2021 07:39,  by Rin Diaz  Performed at:  [facility]  [address] [facility], [address]   Phone [phone]   POCT GLUCOSE - Abnormal; Notable for the following components:    POC Glucose 153 (*)     All other components within normal limits    Narrative:     Performed at:  [facility]  [address] [facility], [address]   Phone [phone]   POCT GLUCOSE - Abnormal; Notable for the following components:    POC Glucose 186 (*)     All other components within normal limits    Narrative:     Performed at:  [facility] [address], [facility], [address]   Phone [phone]   POCT GLUCOSE - Abnormal; Notable for the following components:    POC Glucose 174 (*)     All other components within normal limits    Narrative:     Performed at:  [facility] [address], [facility], [facility] [employer]   Phone [phone]   CULTURE, BLOOD 1   CULTURE, BLOOD 2   CULTURE, URINE   FECAL LEUKOCYTES   LACTATE, SEPSIS    Narrative:     Performed at:  [facility] [address], [facility], [facility] [address]   Phone [phone]   LACTATE, SEPSIS    Narrative:     Collection has been rescheduled by Edwin Cintron at 03/19/2021 04:54 Reason:   Patient has port or line  Performed at:  [facility] [address], [facility], [facility] [address]   Phone [phone]   MICROSCOPIC URINALYSIS    Narrative:     Performed at:  [facility] [address], [facility], [facility] [employer]   Phone [phone]   BLOOD SMEAR REVIEW    Narrative:     Performed at:  [facility] [address], [facility], [address]   Phone [phone]   HEMOGLOBIN A1C   COVID-19   CBC WITH AUTO DIFFERENTIAL   COMPREHENSIVE METABOLIC PANEL W/ REFLEX TO MG FOR LOW K   POTASSIUM W/ REFLEX TO MAGNESIUM   LACTIC ACID, PLASMA   CBC WITH AUTO DIFFERENTIAL   POCT GLUCOSE   POCT GLUCOSE   POCT GLUCOSE POCT GLUCOSE   TYPE AND SCREEN    Narrative:     Performed at:  [facility]  [address], [address]   Phone [phone]   PREPARE RBC (CROSSMATCH)       All other labs were within normal range or not returned as of this dictation. EKG: All EKG's are interpreted by the Emergency Department Physician in the absence of a cardiologist.  Please see their note for interpretation of EKG. RADIOLOGY:   Non-plain film images such as CT, Ultrasound and MRI are read by the radiologist. Plain radiographic images are visualized and preliminarily interpreted by the ED Provider with the below findings:        Interpretation per the Radiologist below, if available at the time of this note:    XR CHEST PORTABLE   Final Result   Stable chest with no acute abnormality seen           Xr Chest Portable    Result Date: 3/19/2021  EXAMINATION: ONE XRAY VIEW OF THE CHEST 3/19/2021 12:26 am COMPARISON: 11/02/2020 HISTORY: ORDERING SYSTEM PROVIDED HISTORY: SOB TECHNOLOGIST PROVIDED HISTORY: Reason for exam:->SOB Reason for Exam: shortness of breath and fatigue Acuity: Acute Type of Exam: Initial Relevant Medical/Surgical History: previous hypertension and cancer FINDINGS: The heart is borderline enlarged and less prominent. The pulmonary vessels are normal.  No consolidation or effusion is seen. There is a left Port-A-Cath in place which is unchanged. Stable chest with no acute abnormality seen           PROCEDURES   Unless otherwise noted below, none     Procedures    CRITICAL CARE TIME   The total critical care time spent while evaluating and treating this patient was at least 41 minutes. This excludes time spent doing separately billable procedures. This includes time at the bedside, data interpretation, medication management, obtaining critical history from collateral sources if the patient is unable to provide it directly, and physician consultation.   Specifics of interventions taken and potentially life-threatening diagnostic considerations are listed above in the medical decision making.       CONSULTS:  IP CONSULT TO HOSPITALIST  IP CONSULT TO INFECTIOUS DISEASES  IP CONSULT TO ONCOLOGY      EMERGENCY DEPARTMENT COURSE and DIFFERENTIAL DIAGNOSIS/MDM:   Vitals:    Vitals:    03/19/21 1715 03/19/21 1825 03/19/21 1945 03/19/21 2000   BP: 129/83 121/79 119/79 133/87   Pulse: 88 72 67 71   Resp: 16 16 16 16   Temp: 98.6 °F (37 °C) 98.2 °F (36.8 °C) 98 °F (36.7 °C) 98.2 °F (36.8 °C)   TempSrc: Oral Oral Oral Oral   SpO2: 98% 98% 97% 95%   Weight:       Height:           Patient was given the following medications:  Medications   0.9 % sodium chloride infusion (has no administration in time range)   glucose (GLUTOSE) 40 % oral gel 15 g (has no administration in time range)   dextrose 50 % IV solution (has no administration in time range)   glucagon (rDNA) injection 1 mg (has no administration in time range)   dextrose 5 % solution (has no administration in time range)   insulin glargine (LANTUS;BASAGLAR) injection pen 15 Units (has no administration in time range)   insulin lispro (1 Unit Dial) 0-6 Units (1 Units Subcutaneous Given 3/19/21 1815)   insulin lispro (1 Unit Dial) 0-3 Units (has no administration in time range)   cefepime (MAXIPIME) 2000 mg IVPB minibag (0 mg Intravenous Stopped 3/19/21 1220)   sodium chloride flush 0.9 % injection 10 mL (has no administration in time range)   sodium chloride flush 0.9 % injection 10 mL (has no administration in time range)   promethazine (PHENERGAN) tablet 12.5 mg (has no administration in time range)     Or   ondansetron (ZOFRAN) injection 4 mg (has no administration in time range)   polyethylene glycol (GLYCOLAX) packet 17 g (has no administration in time range)   acetaminophen (TYLENOL) tablet 650 mg (has no administration in time range)     Or   acetaminophen (TYLENOL) suppository 650 mg (has no administration in time range) potassium chloride 10 mEq/100 mL IVPB (Peripheral Line) (has no administration in time range)   magnesium sulfate 2000 mg in 50 mL IVPB premix (has no administration in time range)   0.9 % sodium chloride infusion (has no administration in time range)   magic (miracle) mouthwash with nystatin (has no administration in time range)   fluconazole (DIFLUCAN) tablet 400 mg (has no administration in time range)   0.9 % sodium chloride bolus (0 mLs Intravenous Stopped 3/19/21 0254)   cefepime (MAXIPIME) 2000 mg IVPB minibag (0 mg Intravenous Stopped 3/19/21 0254)   acetaminophen (TYLENOL) tablet 650 mg (650 mg Oral Given 3/19/21 1804)           Briefly, this is  63year old male that  presents to the emergency department with complaint of abnormal labs, fatigue, fever. Patient does have history of pancreatic cancer and is on chemotherapy. He does see Dr. Jorge L Leung at [facility], was told to come to the emergency department this evening as he had a fever greater than 101 and was neutropenic with last outpatient labs. Patient reports that he has no energy and is experiencing some body aches with chronic abdominal pain. He does appear ill. Accompanied by his daughter today in the ER. UA no concern for infection. CBC shows severe neutropenia with white blood cell count of 0.2. He is pancytopenic with hemoglobin of 7.4 and platelets of 19. CMP shows normal renal function. Lactate within normal limits. Chest x-ray shows stable chest with no acute abnormality seen. He was given cefepime and IV fluids for concern of neutropenic fever. Accepted per hospitalist services, patient updated regarding admission and agreeable. FINAL IMPRESSION      1. Neutropenic fever ([facility] [address]          DISPOSITION/PLAN   DISPOSITION Admitted 03/19/2021 03:26:00 AM      PATIENT REFERREDTO:  No follow-up provider specified.     DISCHARGE MEDICATIONS:  Current Discharge Medication List          DISCONTINUED MEDICATIONS:  Current Discharge Medication List      STOP taking these medications       ibuprofen (ADVIL;MOTRIN) 800 MG tablet Comments:   Reason for Stopping:                      (Please note that portions of this note were completed with a voice recognition program.  Efforts were made to edit the dictations but occasionally words are mis-transcribed.)    SAUNDRA Ahmadi CNP (electronically signed)           SAUNDRA Ahmadi CNP  03/19/21 2044

## 2021-03-19 NOTE — CONSULTS
Oncology [facility]ematology Care   Consult Note      3/19/2021 1:09 PM        Name: Misha Vargas . Admitted: 3/18/2021     HPI:  Flex Winter is a patient of Dr. 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 2 out of 14 lymph nodes involved, perineural invasion present, uncinate margin positive. Status post Whipple procedure on 2/26/2018. Status post removal of hepatojejunostomy stent. 6 months of gemcitabine and capecitabine finished on 11/21/18. Finished radiation with Xeloda (1000 mg bid during RT) on 2/22/19. MSI stable. CT from 7/2019 consistent with progression. Started Gemzar and Abraxane 7/23/2019. CT scans from 10/2020 with progression. He began on liposomal irinotecan, 5-FU and leucovorin on 11/10/2020. After 3 cycles, he was admitted with diarrhea and mucositis. Started FOLFOX with neulasta support 2/2/2021. CA 19-9 has dropped with treatment. Noted neutropenia despite Neulasta. Last treatment on 3/9/21 with Neulasta    He reported a temp of 101 last night and was instructed to report to ED. He was started on cefepime, blood cultures were obtained. He c/o fatigue and diarrhea. He usually has diarrhea, but this is not unusual for him after his treatment.         Reviewed interval ancillary notes    Current Medications  0.9 % sodium chloride infusion, PRN  glucose (GLUTOSE) 40 % oral gel 15 g, PRN  dextrose 50 % IV solution, PRN  glucagon (rDNA) injection 1 mg, PRN  dextrose 5 % solution, PRN  insulin glargine (LANTUS;BASAGLAR) injection pen 15 Units, Nightly  insulin lispro (1 Unit Dial) 0-6 Units, TID WC  insulin lispro (1 Unit Dial) 0-3 Units, Nightly  cefepime (MAXIPIME) 2000 mg IVPB minibag, Q8H  sodium chloride flush 0.9 % injection 10 mL, 2 times per day  sodium chloride flush 0.9 % injection 10 mL, PRN  promethazine (PHENERGAN) tablet 12.5 mg, Q6H PRN    Or  ondansetron (ZOFRAN) injection 4 mg, Q6H PRN  polyethylene glycol (GLYCOLAX) packet 17 g, Daily PRN  acetaminophen (TYLENOL) tablet 650 mg, Q6H PRN    Or  acetaminophen (TYLENOL) suppository 650 mg, Q6H PRN  potassium chloride 10 mEq/100 mL IVPB (Peripheral Line), PRN  magnesium sulfate 2000 mg in 50 mL IVPB premix, PRN        Objective:  /69   Pulse 81   Temp 98.3 °F (36.8 °C) (Oral)   Resp 16   Ht 5' 8\" (1.727 m)   Wt 187 lb 8 oz (85 kg)   SpO2 96%   BMI 28.51 kg/m²     Intake/Output Summary (Last 24 hours) at 3/19/2021 1309  Last data filed at 3/19/2021 1041  Gross per 24 hour   Intake 360 ml   Output --   Net 360 ml      Wt Readings from Last 3 Encounters:   03/19/21 187 lb 8 oz (85 kg)   02/24/21 174 lb (78.9 kg)   01/29/21 200 lb (90.7 kg)       General appearance:  Appears comfortable  Eyes: Sclera clear. Pupils equal.  ENT: Moist oral mucosa. Trachea midline, no adenopathy. Cardiovascular: Regular rhythm, normal S1, S2. No murmur. No edema in lower extremities  Respiratory: Not using accessory muscles. Good inspiratory effort. Clear to auscultation bilaterally, no wheeze or crackles. GI: Abdomen soft, no tenderness, not distended  Musculoskeletal: No cyanosis in digits, neck supple  Neurology: CN 2-12 grossly intact. No speech or motor deficits  Psych: Normal affect. Alert and oriented in time, place and person  Skin: Warm, dry, normal turgor    Labs and Tests:  CBC:   Recent Labs     03/19/21  0008 03/19/21  0700   WBC 0.2* 0.3*   HGB 7.4* 6.9*   PLT 19* 22*     BMP:    Recent Labs     03/19/21  0008   *   K 4.2      CO2 25   BUN 6*   CREATININE 0.6*   GLUCOSE 212*     Hepatic:   Recent Labs     03/19/21  0008   AST 21   ALT 32   BILITOT 0.5   ALKPHOS 227*       ASSESSMENT AND PLAN    Active Problems:    Pancreatic cancer ([address]    Neutropenic fever (HCC)  Resolved Problems:    * No resolved hospital problems.  *      Pancreatic carcinoma  - previous treatment as stated above  - currently receiving FOLFOX, last tx 3/9/21 with Neulasta    Pancytopenia  - d/t chemotherapy  - neutropenia with fever, started on cefepime  - blood cultures pending  - s/p Neulasta on 3/9/21  - ID consulted pending  - hgb 6.9, one unit pRBCs ordered  - plts 22, no evidence of bleeding, no need to transfuse at this time    Diarrhea  - likely d/t chemotherapy  - stool testing pending    Josué Resendiz, [address] [facility]  Oncology Hematology Care    Patient was seen this evening. Admitted for neutropenic fever. Profound pancytopenia secondary to chemotherapy. Metastatic pancreatic carcinoma initially treated with Whipple procedure on 2/26/2018. Afebrile since admission. Generalized weakness. Profound neutropenia with severe anemia and thrombocytopenia. No signs of active bleeding. Patient had Neulasta on 3/9/2021. Currently on cefepime and blood cultures pending. Will be given 1 unit packed red blood cell transfusion. Explained to the patient. Micki Clark.  Toshia Cruz MD, [facility]  Hematology and Oncology  [facility]  [phone]

## 2021-03-19 NOTE — CARE COORDINATION
Discharge [facility] Assessment  Readmission risk score 24%    RN discharge planner met with patient/ (and family member) to discuss reason for admission, current living situation, and potential needs at the time of discharge    Demographics/Insurance verified Yes  Demographics verified per face sheet    Current type of dwellin level home    Patient from ECF/SW confirmed with:n/a    Living arrangements:with daughter    Level of function/Support:usually independent    PCP: Roxy    Last Visit to PCP:Oct 2020    DME:walker    Active with any community resources/agencies/skilled home care: has used [facility] following a recent admission    Medication compliance issues: not reported    Financial issues that could impact healthcare:not reported      Tentative discharge plan:home    *Discussed and provided facilities of choice if transition to a skilled nursing facility is required at the time of discharge      *Discussed with patient and/or family that on the day of discharge home tentative time of discharge will be between 10 AM and noon.     Transportation at the time of discharge:family    MEHUL CesarN, CCM, RN  [facility]  [phone]

## 2021-03-19 NOTE — PROGRESS NOTES
Elevated lab blood glucose of 212 from admission labs last night noted, the patient with history of type II diabetes, insulin dependent.  sent secure message with home & previous admission basal bolus doses, telephone orders received. Nelli Herring RN notified of new order for accuchecks AC & HS, low dose correctional insulin & nightly lantus. Nelli Herring RN verbalized understanding. Eulogio Yin RN, BSN, PCCN.
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
## 2021-03-20 NOTE — PROGRESS NOTES
Shift assessment complete. VSS. Scheduled medications given. Pt. Tolerated blood transfusion well. No transfusion reaction noted. Fall precautions in place. Call light within reach. Pt. denies further needs at this time.

## 2021-03-20 NOTE — PROGRESS NOTES
Comprehensive Nutrition Assessment    Type and Reason for Visit:  Initial, Positive Nutrition Screen    Nutrition Recommendations/Plan:   1. Continue current diet  2. RD ordered Glucerna TID  3. Encourage po    Nutrition Assessment:  Pt is nutritionally compromised d/t poor intake secondary to taste changes related to chemotherapy. Pt dx with pancreatic cancer, pt's last chemo was 3/9/21. Pt states that appetite has decreased d/t taste changes (most foods he can't taste) but also has sores in his mouth. Pt has lost 24lbs in 7 months, -11% change in wt. Pt denies any n/v. Pt would like a supplement. RD to order Glucerna TID. However pt did consume 100% of breakfast while RD was speaking with pt. Pt denies any other needs at this time. Malnutrition Assessment:  Malnutrition Status:  Severe malnutrition    Context:  Chronic Illness     Findings of the 6 clinical characteristics of malnutrition:  Energy Intake:  7 - 75% or less estimated energy requirements for 1 month or longer  Weight Loss:  7 - 10% over 6 months     Body Fat Loss:  1 - Mild body fat loss Orbital, Triceps   Muscle Mass Loss:  1 - Mild muscle mass loss Temples (temporalis), Clavicles (pectoralis & deltoids)  Fluid Accumulation:  7 - Severe Extremities   Strength:       Estimated Daily Nutrient Needs:  Energy (kcal):  1771-4708 cals (20-25 cals/85kg); Weight Used for Energy Requirements:  Current     Protein (g):   gms (1.3-2.0 gms/70kg IBW); Weight Used for Protein Requirements:  Ideal        Fluid (ml/day):   ; Method Used for Fluid Requirements:  1 ml/kcal      Nutrition Related Findings:  BLE +2 pitting edema. I/O's: +900.  Na 134L, Mg 1.50L (mg sulfate ordered prn)      Wounds:  None       Current Nutrition Therapies:    DIET GENERAL;  Dietary Nutrition Supplements: Diabetic Oral Supplement    Anthropometric Measures:  · Height: 5' 8\" (172.7 cm)  · Current Body Weight: 187 lb (84.8 kg)   · Ideal Body Weight: 154 lbs; % Ideal Body Weight     · BMI: 28.4  · BMI Categories: Overweight (BMI 25.0-29. 9)       Nutrition Diagnosis:   · Inadequate protein-energy intake related to inadequate protein-energy intake as evidenced by weight loss greater than or equal to 10% in 6 months, mild loss of subcutaneous fat, mild muscle loss      Nutrition Interventions:   Food and/or Nutrient Delivery:  Continue Current Diet, Start Oral Nutrition Supplement  Nutrition Education/Counseling:  Education not indicated   Coordination of Nutrition Care:  Continue to monitor while inpatient    Goals:  PO to remain greater than 75% at meals/supp       Nutrition Monitoring and Evaluation:   Behavioral-Environmental Outcomes:  None Identified   Food/Nutrient Intake Outcomes:  Food and Nutrient Intake, Supplement Intake  Physical Signs/Symptoms Outcomes:  Weight, Biochemical Data, Chewing or Swallowing(mouth sores)     Discharge Planning:     Too soon to determine     Electronically signed by Aristides Cardona RD, [address] , [place] on 3/20/21 at 11:15 AM EDT    Contact: 3-2022

## 2021-03-20 NOTE — PLAN OF CARE
Nutrition Problem #1: Inadequate protein-energy intake  Intervention: Food and/or Nutrient Delivery: Continue Current Diet, Start Oral Nutrition Supplement  Nutritional Goals: PO to remain greater than 75% at meals/supp

## 2021-03-20 NOTE — PROGRESS NOTES
[facility]   Progress Note      SUBJECTIVE:      Events noted. No fever. Has mouth sores. Appetite is poor. Diarrhea better    OBJECTIVE    Physical  VITALS:  /85   Pulse 75   Temp 98.4 °F (36.9 °C) (Oral)   Resp 16   Ht 5' 8\" (1.727 m)   Wt 187 lb 8 oz (85 kg)   SpO2 95%   BMI 28.51 kg/m²   TEMPERATURE:  Current - Temp: 98.4 °F (36.9 °C); Max - Temp  Av.3 °F (36.8 °C)  Min: 98 °F (36.7 °C)  Max: 98.6 °F (37 °C)  BLOOD PRESSURE RANGE:  Systolic (74NKV), PHQ:773 , Min:103 , VIE:572   ; Diastolic (25OXE), BAP:25, Min:69, Max:87    24HR INTAKE/OUTPUT:      Intake/Output Summary (Last 24 hours) at 3/20/2021 0746  Last data filed at 3/19/2021 2118  Gross per 24 hour   Intake 900 ml   Output --   Net 900 ml       Conscious alert. Pallor is present. No icterus  Lungs are clear to auscultation respiratory efforts are normal.  Abdomen is soft bowel sounds are heard. Extremities 1+ edema.     Data  Labs:  General Labs:  CBC with Differential:    Lab Results   Component Value Date    WBC 0.8 2021    RBC 3.56 2021    HGB 9.3 2021    HCT 29.1 2021    PLT 25 2021    MCV 81.8 2021    MCH 26.2 2021    MCHC 32.1 2021    RDW 20.4 2021    NRBC 2 2021    BANDSPCT 2 2021    METASPCT 4 2021    LYMPHOPCT 44.0 2021    MONOPCT 10.0 2021    MYELOPCT 1 2021    BASOPCT 8.0 2021    MONOSABS 0.1 2021    LYMPHSABS 0.4 2021    EOSABS 0.0 2021    BASOSABS 0.1 2021     BMP:    Lab Results   Component Value Date     2021    K 3.5 2021     2021    CO2 25 2021    BUN 6 2021    LABALBU 2.3 2021    CREATININE 0.6 2021    CALCIUM 8.0 2021    GFRAA >60 2021    GFRAA >60 2012    LABGLOM >60 2021    GLUCOSE 78 2021     Hepatic Function Panel:    Lab Results   Component Value Date    ALKPHOS 196 2021    ALT 29 03/20/2021    AST 19 03/20/2021    PROT 4.4 03/20/2021    PROT 7.2 10/13/2012    BILITOT 0.6 03/20/2021    BILIDIR <0.2 12/29/2020    IBILI see below 12/29/2020    LABALBU 2.3 03/20/2021     LDH:  No results found for: LDH  PT/INR:    Lab Results   Component Value Date    PROTIME 16.2 12/29/2020    INR 1.39 12/29/2020     PTT:    Lab Results   Component Value Date    APTT 30.7 12/21/2020   [APTT    Current Medications  Current Facility-Administered Medications: morphine (MS CONTIN) extended release tablet 15 mg, 15 mg, Oral, 2 times per day  HYDROcodone-acetaminophen (NORCO) 5-325 MG per tablet 1 tablet, 1 tablet, Oral, Q6H PRN  magic (miracle) mouthwash with nystatin, 5 mL, Swish & Spit, 4x Daily  0.9 % sodium chloride infusion, , Intravenous, PRN  glucose (GLUTOSE) 40 % oral gel 15 g, 15 g, Oral, PRN  dextrose 50 % IV solution, 12.5 g, Intravenous, PRN  glucagon (rDNA) injection 1 mg, 1 mg, Intramuscular, PRN  dextrose 5 % solution, 100 mL/hr, Intravenous, PRN  insulin glargine (LANTUS;BASAGLAR) injection pen 15 Units, 15 Units, Subcutaneous, Nightly  insulin lispro (1 Unit Dial) 0-6 Units, 0-6 Units, Subcutaneous, TID WC  insulin lispro (1 Unit Dial) 0-3 Units, 0-3 Units, Subcutaneous, Nightly  cefepime (MAXIPIME) 2000 mg IVPB minibag, 2,000 mg, Intravenous, Q8H  sodium chloride flush 0.9 % injection 10 mL, 10 mL, Intravenous, 2 times per day  sodium chloride flush 0.9 % injection 10 mL, 10 mL, Intravenous, PRN  promethazine (PHENERGAN) tablet 12.5 mg, 12.5 mg, Oral, Q6H PRN **OR** ondansetron (ZOFRAN) injection 4 mg, 4 mg, Intravenous, Q6H PRN  polyethylene glycol (GLYCOLAX) packet 17 g, 17 g, Oral, Daily PRN  acetaminophen (TYLENOL) tablet 650 mg, 650 mg, Oral, Q6H PRN **OR** acetaminophen (TYLENOL) suppository 650 mg, 650 mg, Rectal, Q6H PRN  potassium chloride 10 mEq/100 mL IVPB (Peripheral Line), 10 mEq, Intravenous, PRN  magnesium sulfate 2000 mg in 50 mL IVPB premix, 2,000 mg, Intravenous, PRN  0.9 %

## 2021-03-21 NOTE — PROGRESS NOTES
[facility]   Progress Note      SUBJECTIVE:      Events noted. No fever. Mouth sores better      Appetite is poor. Diarrhea better    OBJECTIVE    Physical  VITALS:  /70   Pulse 80   Temp 98.1 °F (36.7 °C) (Oral)   Resp 18   Ht 5' 8\" (1.727 m)   Wt 187 lb 8 oz (85 kg)   SpO2 98%   BMI 28.51 kg/m²   TEMPERATURE:  Current - Temp: 98.1 °F (36.7 °C); Max - Temp  Av.8 °F (36.6 °C)  Min: 97.3 °F (36.3 °C)  Max: 98.5 °F (36.9 °C)  BLOOD PRESSURE RANGE:  Systolic (13BCP), EQW:635 , Min:95 , UIA:341   ; Diastolic (26HDB), WBU:74, Min:64, Max:78    24HR INTAKE/OUTPUT:      Intake/Output Summary (Last 24 hours) at 3/21/2021 9560  Last data filed at 3/20/2021 1153  Gross per 24 hour   Intake 360 ml   Output --   Net 360 ml       Conscious alert. Pallor is present. Mucositis+  No icterus  Lungs are clear to auscultation respiratory efforts are normal.  Abdomen is soft bowel sounds are heard. Extremities 1+ edema.     Data  Labs:  General Labs:  CBC with Differential:    Lab Results   Component Value Date    WBC 1.8 2021    RBC 3.33 2021    HGB 8.6 2021    HCT 27.2 2021    PLT 26 2021    MCV 81.6 2021    MCH 25.8 2021    MCHC 31.7 2021    RDW 20.4 2021    NRBC 2 2021    BANDSPCT 2 2021    METASPCT 4 2021    LYMPHOPCT 44.0 2021    MONOPCT 10.0 2021    MYELOPCT 1 2021    BASOPCT 8.0 2021    MONOSABS 0.1 2021    LYMPHSABS 0.4 2021    EOSABS 0.0 2021    BASOSABS 0.1 2021     BMP:    Lab Results   Component Value Date     2021    K 3.5 2021     2021    CO2 25 2021    BUN 6 2021    LABALBU 2.3 2021    CREATININE 0.6 2021    CALCIUM 8.0 2021    GFRAA >60 2021    GFRAA >60 2012    LABGLOM >60 2021    GLUCOSE 78 2021     Hepatic Function Panel:    Lab Results   Component Value Date    ALKPHOS 196 03/20/2021    ALT 29 03/20/2021    AST 19 03/20/2021    PROT 4.4 03/20/2021    PROT 7.2 10/13/2012    BILITOT 0.6 03/20/2021    BILIDIR <0.2 12/29/2020    IBILI see below 12/29/2020    LABALBU 2.3 03/20/2021     LDH:  No results found for: LDH  PT/INR:    Lab Results   Component Value Date    PROTIME 16.2 12/29/2020    INR 1.39 12/29/2020     PTT:    Lab Results   Component Value Date    APTT 30.7 12/21/2020   [APTT    Current Medications  Current Facility-Administered Medications: HYDROcodone-acetaminophen (NORCO) 5-325 MG per tablet 1 tablet, 1 tablet, Oral, Q6H PRN  magic (miracle) mouthwash with nystatin, 5 mL, Swish & Spit, 4x Daily  aspirin EC tablet 81 mg, 81 mg, Oral, Daily  cetirizine (ZYRTEC) tablet 10 mg, 10 mg, Oral, Nightly  cyclobenzaprine (FLEXERIL) tablet 5 mg, 5 mg, Oral, TID  digoxin (LANOXIN) tablet 125 mcg, 125 mcg, Oral, Daily  morphine (MS CONTIN) extended release tablet 15 mg, 15 mg, Oral, Q8H  tamsulosin (FLOMAX) capsule 0.4 mg, 0.4 mg, Oral, BID  0.9 % sodium chloride infusion, , Intravenous, PRN  glucose (GLUTOSE) 40 % oral gel 15 g, 15 g, Oral, PRN  dextrose 50 % IV solution, 12.5 g, Intravenous, PRN  glucagon (rDNA) injection 1 mg, 1 mg, Intramuscular, PRN  dextrose 5 % solution, 100 mL/hr, Intravenous, PRN  insulin glargine (LANTUS;BASAGLAR) injection pen 15 Units, 15 Units, Subcutaneous, Nightly  insulin lispro (1 Unit Dial) 0-6 Units, 0-6 Units, Subcutaneous, TID WC  insulin lispro (1 Unit Dial) 0-3 Units, 0-3 Units, Subcutaneous, Nightly  cefepime (MAXIPIME) 2000 mg IVPB minibag, 2,000 mg, Intravenous, Q8H  sodium chloride flush 0.9 % injection 10 mL, 10 mL, Intravenous, 2 times per day  sodium chloride flush 0.9 % injection 10 mL, 10 mL, Intravenous, PRN  promethazine (PHENERGAN) tablet 12.5 mg, 12.5 mg, Oral, Q6H PRN **OR** ondansetron (ZOFRAN) injection 4 mg, 4 mg, Intravenous, Q6H PRN  polyethylene glycol (GLYCOLAX) packet 17 g, 17 g, Oral, Daily PRN  acetaminophen (TYLENOL) tablet 650 mg, 650 mg, Oral, Q6H PRN **OR** acetaminophen (TYLENOL) suppository 650 mg, 650 mg, Rectal, Q6H PRN  potassium chloride 10 mEq/100 mL IVPB (Peripheral Line), 10 mEq, Intravenous, PRN  magnesium sulfate 2000 mg in 50 mL IVPB premix, 2,000 mg, Intravenous, PRN  0.9 % sodium chloride infusion, , Intravenous, PRN  fluconazole (DIFLUCAN) tablet 400 mg, 400 mg, Oral, Daily    ASSESSMENT AND PLAN    1. Advanced/metastatic pancreatic adenocarcinoma:    -Currently on chemotherapy on FOLFOX and Neulasta.  -Last treatment was on 3/9/2021    -Previously had Whipple surgery in February 2018 followed by adjuvant chemotherapy with gemcitabine and capecitabine followed by radiation with Xeloda.  -Had progression in July 2019. Received Gemzar and Abraxane.  -Subsequently had progression in October 2020. Received liposomal irinotecan, 5-FU and leucovorin. Developed severe diarrhea and mucositis after 3 cycles        2. Pancytopenia:    -Due to chemotherapy. -Received Neulasta on 3/9/2021  - Hb better after transfusion  -Transfuse PRBCs if hemoglobin is less than 7  -Transfuse platelets if platelet count is less than 10 or if there is evidence of bleeding.  - WBC is better today. ANC 1.3    3. Febrile neutropenia:    -ID consult appreciated. - BC UC neg so far. -Continue cefepime.  -He is also on Diflucan    4. Neoplasm related pain:    -Continue MS Contin and Norco.  He is also getting IV morphine    5.  Mucositis:    Magic mouth wash      Letty Ferrera MD

## 2021-03-21 NOTE — PROGRESS NOTES
Hospitalist Progress Note      PCP: Caprice Austin MD    Date of Admission: 3/18/2021        Subjective:     Feels better . No fevers this morning. Has poor appetite    Medications:  Reviewed    Infusion Medications    sodium chloride      sodium chloride      dextrose      sodium chloride       Scheduled Medications    insulin glargine  8 Units Subcutaneous Nightly    magic (miracle) mouthwash with nystatin  5 mL Swish & Spit 4x Daily    aspirin EC  81 mg Oral Daily    cetirizine  10 mg Oral Nightly    cyclobenzaprine  5 mg Oral TID    digoxin  125 mcg Oral Daily    morphine  15 mg Oral Q8H    tamsulosin  0.4 mg Oral BID    insulin lispro  0-6 Units Subcutaneous TID WC    insulin lispro  0-3 Units Subcutaneous Nightly    cefepime  2,000 mg Intravenous Q8H    sodium chloride flush  10 mL Intravenous 2 times per day    fluconazole  400 mg Oral Daily     PRN Meds: HYDROcodone 5 mg - acetaminophen, sodium chloride, glucose, dextrose, glucagon (rDNA), dextrose, sodium chloride flush, promethazine **OR** ondansetron, polyethylene glycol, acetaminophen **OR** acetaminophen, potassium chloride, magnesium sulfate, sodium chloride    No intake or output data in the 24 hours ending 03/22/21 1220    Exam:    /79   Pulse 80   Temp 97.9 °F (36.6 °C) (Oral)   Resp 16   Ht 5' 8\" (1.727 m)   Wt 187 lb 8 oz (85 kg)   SpO2 100%   BMI 28.51 kg/m²     General appearance: No apparent distress, appears stated age and cooperative. HEENT: Pupils equal, round, and reactive to light. Conjunctivae/corneas clear. Neck: Supple, with full range of motion. No jugular venous distention. Trachea midline. Respiratory:  Normal respiratory effort. Clear to auscultation, bilaterally without Rales/Wheezes/Rhonchi. Cardiovascular: Regular rate and rhythm with normal S1/S2 without murmurs, rubs or gallops. Abdomen: Soft, non-tender, non-distended with normal bowel sounds.   Musculoskeletal: No clubbing, cyanosis or edema bilaterally. Full range of motion without deformity. Skin: Skin color, texture, turgor normal.  No rashes or lesions. Neurologic:  Neurovascularly intact without any focal sensory/motor deficits. Cranial nerves: II-XII intact, grossly non-focal.  Psychiatric: Alert and oriented, thought content appropriate, normal insight  Capillary Refill: Brisk,< 3 seconds   Peripheral Pulses: +2 palpable, equal bilaterally       Labs:   Recent Labs     03/20/21  0622 03/21/21  0520 03/22/21  0630   WBC 0.8* 1.8* 8.4   HGB 9.3* 8.6* 11.1*   HCT 29.1* 27.2* 35.2*   PLT 25* 26* 47*     Recent Labs     03/20/21  0622   *   K 3.5      CO2 25   BUN 6*   CREATININE 0.6*   CALCIUM 8.0*     Recent Labs     03/20/21  0622   AST 19   ALT 29   BILITOT 0.6   ALKPHOS 196*     No results for input(s): INR in the last 72 hours. No results for input(s): Rachel Robertson in the last 72 hours. Assessment/Plan:    -Neutropenic fever--resolved--no localizing signs or symptoms--on IV cefepime empirically-ID following. . Recommending no antibiotics on discharge with no fevers and WBC improving  -Chemotherapy-induced pancytopenia--improving with Neupogen--continue to monitor blood counts  -Metastatic pancreatic cancer--on palliative chemotherapy  -Oral candidiasis/mucositis -on fluconazole and Magic mouthwash  -Diarrhea and mucositis due to chemotherapy-improving  -DM type 2- controlled on insulin      Disposition-expect discharge in 24 hours if continues to improve    DVT Prophylaxis: SCDs  Diet: DIET GENERAL;  Dietary Nutrition Supplements: Diabetic Oral Supplement  Code Status: Full Code          Danita Holter, MD

## 2021-03-21 NOTE — PROGRESS NOTES
Shift assessment completed. Routine vitals stable. Scheduled medications given. Patient is awake, alert and oriented. Respirations are easy and unlabored. Patient does not appear to be in distress, resting comfortably at this time. Call light within reach.

## 2021-03-21 NOTE — PROGRESS NOTES
ID Follow-up NOTE    CC:   Fever and neutropenia, oral candidiasis  Antibiotics: Cefepime, fluconazole    Admit Date: 3/18/2021  Hospital Day: 4    Subjective:     Patient feels good   No specific complaint      Objective:     Patient Vitals for the past 8 hrs:   BP Temp Temp src Pulse Resp SpO2   03/21/21 1023 104/73 98.5 °F (36.9 °C) Oral 87 18 --   03/21/21 1015 -- 98.5 °F (36.9 °C) Oral 83 18 96 %   03/21/21 0413 103/70 98.1 °F (36.7 °C) Oral 80 18 98 %     I/O last 3 completed shifts: In: 360 [P.O.:360]  Out: -   I/O this shift: In: 480 [P.O.:480]  Out: -     EXAM:  GENERAL: No apparent distress.     HEENT: Membranes moist, no oral lesion - no further thrush seen  NECK:  Supple, no lymphadenopathy  LUNGS: Clear b/l, no rales, no dullness  CARDIAC: RRR, no murmur appreciated  ABD:  + BS, soft / NT  EXT:  No rash, no edema, no lesions  NEURO: No focal neurologic findings  PSYCH: Orientation, sensorium, mood normal  LINES:  L chest port accessed       Data Review:  Lab Results   Component Value Date    WBC 1.8 (LL) 03/21/2021    HGB 8.6 (L) 03/21/2021    HCT 27.2 (L) 03/21/2021    MCV 81.6 03/21/2021    PLT 26 (L) 03/21/2021     Lab Results   Component Value Date    CREATININE 0.6 (L) 03/20/2021    BUN 6 (L) 03/20/2021     (L) 03/20/2021    K 3.5 03/20/2021     03/20/2021    CO2 25 03/20/2021       Hepatic Function Panel:   Lab Results   Component Value Date    ALKPHOS 196 03/20/2021    ALT 29 03/20/2021    AST 19 03/20/2021    PROT 4.4 03/20/2021    PROT 7.2 10/13/2012    BILITOT 0.6 03/20/2021    BILIDIR <0.2 12/29/2020    IBILI see below 12/29/2020    LABALBU 2.3 03/20/2021       Micro:  3/19 BC - no growth to date  3/19 Urine cult - no growth     Imaging:   3/19 CXR  'Stable chest with no acute abnormality seen '    Scheduled Meds:   magic (miracle) mouthwash with nystatin  5 mL Swish & Spit 4x Daily    aspirin EC  81 mg Oral Daily    cetirizine  10 mg Oral Nightly    cyclobenzaprine  5 mg Oral TID    digoxin  125 mcg Oral Daily    morphine  15 mg Oral Q8H    tamsulosin  0.4 mg Oral BID    insulin glargine  15 Units Subcutaneous Nightly    insulin lispro  0-6 Units Subcutaneous TID WC    insulin lispro  0-3 Units Subcutaneous Nightly    cefepime  2,000 mg Intravenous Q8H    sodium chloride flush  10 mL Intravenous 2 times per day    fluconazole  400 mg Oral Daily       Continuous Infusions:   sodium chloride      dextrose      sodium chloride         PRN Meds:  HYDROcodone 5 mg - acetaminophen, sodium chloride, glucose, dextrose, glucagon (rDNA), dextrose, sodium chloride flush, promethazine **OR** ondansetron, polyethylene glycol, acetaminophen **OR** acetaminophen, potassium chloride, magnesium sulfate, sodium chloride      Assessment:     Hx HTN, gout, DM, BPH  Hx pancreatic cancer, dx 2018, s/p resection / [facility] 2/26/18, rx gemcitabine, capecitabine, XRT   - F/u imaging 7/2019 with progression, started on gemcitabine, abraxane   - F/u imaging 10/2020 with progression, started on liposomal irinotecan, 5-FU, leucovorin   - Started on FOLFOX 2/2/21. Received neulasta (G-CSF) on 3/9.   L chest port      Fever and neutropenia - resolved   - no localizing sx, PE neg, port in place    - fever resolved   - WBC 1.8 today - 46%PMN     Oral Candidiasis - resolved    Plan:     Cont cefepime + fluconazole for now  Will f/u on BC - no growth to date  Monitor     If WBC up further, afeb, ok for discharge 3/22 off cefepime (would give fluconazole x 3 more days at lower dose)     Discussed with pt, RN   Alfonzo Lim

## 2021-03-22 PROBLEM — E11.69 TYPE 2 DIABETES MELLITUS WITH OTHER SPECIFIED COMPLICATION (HCC): Status: ACTIVE | Noted: 2019-01-09

## 2021-03-22 NOTE — PLAN OF CARE
Problem: Falls - Risk of:  Goal: Will remain free from falls  Description: Will remain free from falls  Outcome: Ongoing  Goal: Absence of physical injury  Description: Absence of physical injury  Outcome: Ongoing     Problem: Skin Integrity:  Goal: Will show no infection signs and symptoms  Description: Will show no infection signs and symptoms  Outcome: Ongoing  Goal: Absence of new skin breakdown  Description: Absence of new skin breakdown  Outcome: Ongoing     Problem: Pain:  Goal: Pain level will decrease  Description: Pain level will decrease  Outcome: Ongoing  Goal: Control of acute pain  Description: Control of acute pain  Outcome: Ongoing  Goal: Control of chronic pain  Description: Control of chronic pain  Outcome: Ongoing     Problem: Nutrition  Goal: Optimal nutrition therapy  Outcome: Ongoing

## 2021-03-22 NOTE — PROGRESS NOTES
Pt was given glucose gel for low blood sugar. Pt with emesis after administration. Dr. Murriel Castleman made aware and stated that we should continue with IV dextrose. See eMAR. MD made aware to episodes of emesis this AM and yesterday after lunch as reported by patient. Pt denies nausea at this time.

## 2021-03-22 NOTE — PROGRESS NOTES
[facility]   Progress Note      3/22/2021 9:26 AM        Name: Harper Payne . Admitted: 3/18/2021    SUBJECTIVE:  He is feeling better, has occasional nausea, diarrhea has improved. He offers no new complaints.      Reviewed interval ancillary notes    Current Medications  insulin glargine (LANTUS;BASAGLAR) injection pen 8 Units, Nightly  HYDROcodone-acetaminophen (NORCO) 5-325 MG per tablet 1 tablet, Q6H PRN  magic (miracle) mouthwash with nystatin, 4x Daily  aspirin EC tablet 81 mg, Daily  cetirizine (ZYRTEC) tablet 10 mg, Nightly  cyclobenzaprine (FLEXERIL) tablet 5 mg, TID  digoxin (LANOXIN) tablet 125 mcg, Daily  morphine (MS CONTIN) extended release tablet 15 mg, Q8H  tamsulosin (FLOMAX) capsule 0.4 mg, BID  0.9 % sodium chloride infusion, PRN  glucose (GLUTOSE) 40 % oral gel 15 g, PRN  dextrose 50 % IV solution, PRN  glucagon (rDNA) injection 1 mg, PRN  dextrose 5 % solution, PRN  insulin lispro (1 Unit Dial) 0-6 Units, TID WC  insulin lispro (1 Unit Dial) 0-3 Units, Nightly  cefepime (MAXIPIME) 2000 mg IVPB minibag, Q8H  sodium chloride flush 0.9 % injection 10 mL, 2 times per day  sodium chloride flush 0.9 % injection 10 mL, PRN  promethazine (PHENERGAN) tablet 12.5 mg, Q6H PRN    Or  ondansetron (ZOFRAN) injection 4 mg, Q6H PRN  polyethylene glycol (GLYCOLAX) packet 17 g, Daily PRN  acetaminophen (TYLENOL) tablet 650 mg, Q6H PRN    Or  acetaminophen (TYLENOL) suppository 650 mg, Q6H PRN  potassium chloride 10 mEq/100 mL IVPB (Peripheral Line), PRN  magnesium sulfate 2000 mg in 50 mL IVPB premix, PRN  0.9 % sodium chloride infusion, PRN  fluconazole (DIFLUCAN) tablet 400 mg, Daily        Objective:  /71   Pulse 87   Temp 97.4 °F (36.3 °C) (Oral)   Resp 14   Ht 5' 8\" (1.727 m)   Wt 187 lb 8 oz (85 kg)   SpO2 100%   BMI 28.51 kg/m²     Intake/Output Summary (Last 24 hours) at 3/22/2021 0926  Last data filed at 3/21/2021 1022  Gross per 24 hour   Intake 480 ml Output --   Net 480 ml      Wt Readings from Last 3 Encounters:   03/19/21 187 lb 8 oz (85 kg)   02/24/21 174 lb (78.9 kg)   01/29/21 200 lb (90.7 kg)       General appearance:  Appears comfortable  Eyes: Sclera clear. Pupils equal.  ENT: Moist oral mucosa. Trachea midline, no adenopathy. Cardiovascular: Regular rhythm, normal S1, S2. No murmur. No edema in lower extremities  Respiratory: Not using accessory muscles. Good inspiratory effort. Clear to auscultation bilaterally, no wheeze or crackles. GI: Abdomen soft, no tenderness, not distended  Musculoskeletal: No cyanosis in digits, neck supple  Neurology: CN 2-12 grossly intact. No speech or motor deficits  Psych: Normal affect. Alert and oriented in time, place and person  Skin: Warm, dry, normal turgor    Labs and Tests:  CBC:   Recent Labs     03/20/21  0622 03/21/21  0520 03/22/21  0630   WBC 0.8* 1.8* 8.4   HGB 9.3* 8.6* 11.1*   PLT 25* 26* 47*     BMP:    Recent Labs     03/20/21  0622   *   K 3.5      CO2 25   BUN 6*   CREATININE 0.6*   GLUCOSE 78     Hepatic:   Recent Labs     03/20/21  0622   AST 19   ALT 29   BILITOT 0.6   ALKPHOS 196*       ASSESSMENT AND PLAN    Active Problems:    Severe malnutrition (HCC)    Pancreatic cancer (HCC)    Neutropenic fever (HCC)  Resolved Problems:    * No resolved hospital problems. *      1. Advanced/metastatic pancreatic adenocarcinoma:   -Currently on chemotherapy on FOLFOX and Neulasta.  -Last treatment was on 3/9/2021     -Previously had Whipple surgery in February 2018 followed by adjuvant chemotherapy with gemcitabine and capecitabine followed by radiation with Xeloda.  -Had progression in July 2019. Received Gemzar and Abraxane.  -Subsequently had progression in October 2020. Received liposomal irinotecan, 5-FU and leucovorin. Developed severe diarrhea and mucositis after 3 cycles        2. Pancytopenia:   -Due to chemotherapy.   -Received Neulasta on 3/9/2021  - Hb better after transfusion  -Transfuse PRBCs if hemoglobin is less than 7  -Transfuse platelets if platelet count is less than 10 or if there is evidence of bleeding.  - WBC 8.4, ANC 5.3, plts improving 47k today     3. Febrile neutropenia:   -ID consult appreciated. - BC UC neg so far. - Continue cefepime.  - He is also on Diflucan     4. Neoplasm related pain:   -Continue MS Contin and Norco.  He is also getting IV morphine     5. Mucositis:  - Magic mouth wash      OK for discharge from oncology perspective. He will keep f/u appointment scheduled for 3/23/21.         Vinny Arech[facility], [place], operated by [facility]  [facility]    Patient was seen with VLAD this morning. Feeling better. Counts have much improved. No signs of active bleeding. Okay to discharge from oncology perspective. Follow-up in the office. Deandra Dorantes.  Efra Best MD, [address]  Hematology and Oncology  [facility]  [phone]

## 2021-03-22 NOTE — PROGRESS NOTES
Shift assessment completed. Routine vitals stable. Scheduled medications given. Patient is awake, alert and oriented. Respirations are easy and unlabored. Continues to c/o of some nausea and a slight headache, but says he doesn't want any medication for it. Call light within reach. Will continue to monitor.

## 2021-03-22 NOTE — PROGRESS NOTES
Pt up to BR for urine and stool occurrence. Returned to bed. During trasnfer, pt stated that he still felt dizzy and that he thinks he needs to \"stay another day\". After pt returned to bed he stated that \" he felt well again. \" No s/s of distress. Pt demonstrated how to reach nurse with call light. Call light in reach. Will continue to monitor.   Electronically signed by Irvin Lentz RN on 3/22/2021 at 2:52 PM

## 2021-03-22 NOTE — PROGRESS NOTES
Pt not wishing to take any antiemetics at this time as he states that he is \"feeling better. \" Pt demonstrated how to reach nurse with call light. Call light in reach. Will continue to monitor.

## 2021-03-22 NOTE — PROGRESS NOTES
Pt is alert and oriented x4. Able to follow commands throughout assessment. No s/s of distress. Pt HR and RR within normal limits. Respirations were even and easy with no adventitious sounds. Bowel sounds active x4. Pt demonstrated how to reach nurse with call light. Call light in reach. Will continue to monitor.

## 2021-03-22 NOTE — PROGRESS NOTES
Spoke with Dr. Maryjane Baig regarding daily meds. MD stated that it was OK to administer now as pt doesn't feel nauseous. Will proceed.
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
## 2021-03-23 NOTE — PROGRESS NOTES
Discharge instructions given to pt. Discussed home medication regimen and follow-up appointment. Pt verbalized understanding. Port de-accessed without complication, pt tolerated well. Pt expresses no further needs at this time. Will transport pt down to daughter's car when she arrives.
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
## 2021-03-23 NOTE — PROGRESS NOTES
Physician Progress Note      PATIENT:               Ailin Fritz  CSN #:                  [id number]  :                       1960  ADMIT DATE:       3/18/2021 11:11 PM  [address] [place] DATE:  RESPONDING  PROVIDER #:        Yeimi Land MD          QUERY TEXT:    Pt admitted with Neutropenic fever. Pt noted to have Pancreatic cancer on   chemotherapy. If possible, please document in progress notes and discharge   summary the relationship, if any, between Neutropenic fever and chemotherapy   or between Neutropenic fever and bacterial infection. The medical record reflects the following:  Risk Factors: Chemo therapy  Clinical Indicators: 3/21 MD notes document \"Neutropenic fever--resolved--no   localizing signs or symptoms--on IV cefepime empirically. \"  Treatment: IV antibiotics- Cefepime  Options provided:  -- Neutropenic fever due to chemotherapy  -- Neutropenic fever due to Bacterial infection  -- Neutropenic fever unrelated to chemotherapy or bacterial infection  -- Other - I will add my own diagnosis  -- Disagree - Not applicable / Not valid  -- Disagree - Clinically unable to determine / Unknown  -- Refer to Clinical Documentation Reviewer    PROVIDER RESPONSE TEXT:    This patient has Neutropenic fever due to chemotherapy.     Query created by: Chelsea Gasca on 3/23/2021 9:25 AM      Electronically signed by:  Yeimi Land MD 3/23/2021 1:16 PM

## 2021-03-23 NOTE — PROGRESS NOTES
Shift assessment complete. Vital signs obtained. AM medications administered per MAR. Pt denies pain and is resting in bed at this time. Blood glucose 68 prior to breakfast. Pt provided juice by PCA. Blood glucose now 123. Pt expresses no further needs at this time. Call light in reach.

## 2021-03-23 NOTE — PROGRESS NOTES
[facility]   Progress Note      3/23/2021 10:02 AM        Name: Toy Mccabe . Admitted: 3/18/2021    SUBJECTIVE:  He is feeling better, denies n/v this morning. Diarrhea has improved, still has some dizziness with standing. No external bleeding.        Reviewed interval ancillary notes    Current Medications  insulin glargine (LANTUS;BASAGLAR) injection pen 8 Units, Nightly  HYDROcodone-acetaminophen (NORCO) 5-325 MG per tablet 1 tablet, Q6H PRN  magic (miracle) mouthwash with nystatin, 4x Daily  aspirin EC tablet 81 mg, Daily  cetirizine (ZYRTEC) tablet 10 mg, Nightly  cyclobenzaprine (FLEXERIL) tablet 5 mg, TID  digoxin (LANOXIN) tablet 125 mcg, Daily  morphine (MS CONTIN) extended release tablet 15 mg, Q8H  tamsulosin (FLOMAX) capsule 0.4 mg, BID  0.9 % sodium chloride infusion, PRN  glucose (GLUTOSE) 40 % oral gel 15 g, PRN  dextrose 50 % IV solution, PRN  glucagon (rDNA) injection 1 mg, PRN  dextrose 5 % solution, PRN  insulin lispro (1 Unit Dial) 0-6 Units, TID WC  insulin lispro (1 Unit Dial) 0-3 Units, Nightly  cefepime (MAXIPIME) 2000 mg IVPB minibag, Q8H  sodium chloride flush 0.9 % injection 10 mL, 2 times per day  sodium chloride flush 0.9 % injection 10 mL, PRN  promethazine (PHENERGAN) tablet 12.5 mg, Q6H PRN    Or  ondansetron (ZOFRAN) injection 4 mg, Q6H PRN  polyethylene glycol (GLYCOLAX) packet 17 g, Daily PRN  acetaminophen (TYLENOL) tablet 650 mg, Q6H PRN    Or  acetaminophen (TYLENOL) suppository 650 mg, Q6H PRN  potassium chloride 10 mEq/100 mL IVPB (Peripheral Line), PRN  magnesium sulfate 2000 mg in 50 mL IVPB premix, PRN  0.9 % sodium chloride infusion, PRN  fluconazole (DIFLUCAN) tablet 400 mg, Daily        Objective:  BP 95/64   Pulse 93   Temp 97.4 °F (36.3 °C) (Axillary)   Resp 16   Ht 5' 8\" (1.727 m)   Wt 187 lb 8 oz (85 kg)   SpO2 93%   BMI 28.51 kg/m²     Intake/Output Summary (Last 24 hours) at 3/23/2021 1002  Last data filed at 3/23/2021 9207  Gross per 24 hour   Intake 2726 ml   Output --   Net 2726 ml      Wt Readings from Last 3 Encounters:   03/19/21 187 lb 8 oz (85 kg)   02/24/21 174 lb (78.9 kg)   01/29/21 200 lb (90.7 kg)       General appearance:  Appears comfortable  Eyes: Sclera clear. Pupils equal.  ENT: Moist oral mucosa. Trachea midline, no adenopathy. Cardiovascular: Regular rhythm, normal S1, S2. No murmur. No edema in lower extremities  Respiratory: Not using accessory muscles. Good inspiratory effort. Clear to auscultation bilaterally, no wheeze or crackles. GI: Abdomen soft, no tenderness, not distended  Musculoskeletal: No cyanosis in digits, neck supple  Neurology: CN 2-12 grossly intact. No speech or motor deficits  Psych: Normal affect. Alert and oriented in time, place and person  Skin: Warm, dry, normal turgor    Labs and Tests:  CBC:   Recent Labs     03/21/21  0520 03/22/21  0630 03/23/21  0525   WBC 1.8* 8.4 7.7   HGB 8.6* 11.1* 8.3*   PLT 26* 47* 37*     BMP:    No results for input(s): NA, K, CL, CO2, BUN, CREATININE, GLUCOSE in the last 72 hours. Hepatic:   No results for input(s): AST, ALT, ALB, BILITOT, ALKPHOS in the last 72 hours. ASSESSMENT AND PLAN    Active Problems:    Severe malnutrition ([facility] [address]    Type 2 diabetes mellitus with other specified complication (HCC)    Pancreatic cancer ([facility] [address]    Neutropenic fever ([facility] [address]    Candidiasis    Admission for fitting of Port-A-Cath    Benign prostatic hyperplasia without lower urinary tract symptoms    Overweight (BMI 25.0-29. 9)    Weight loss counseling, encounter for    Encounter for medication counseling  Resolved Problems:    * No resolved hospital problems. *      1.   Advanced/metastatic pancreatic adenocarcinoma:   -Currently on chemotherapy on FOLFOX and Neulasta.  -Last treatment was on 3/9/2021     -Previously had Whipple surgery in February 2018 followed by adjuvant chemotherapy with gemcitabine and capecitabine followed by radiation with Xeloda.  -Had progression in July 2019. Received Gemzar and Abraxane.  -Subsequently had progression in October 2020. Received liposomal irinotecan, 5-FU and leucovorin. Developed severe diarrhea and mucositis after 3 cycles        2. Pancytopenia:   -Due to chemotherapy. -Received Neulasta on 3/9/2021  - Hb better after transfusion  -Transfuse PRBCs if hemoglobin is less than 7  -Transfuse platelets if platelet count is less than 10 or if there is evidence of bleeding.  - plts slightly lower today, no evidence of bleeding  - Neutropenia resolved     3. Febrile neutropenia:   -ID consult appreciated. - BC UC neg so far. - Continue cefepime.  - He is also on Diflucan     4. Neoplasm related pain:   -Continue MS Contin and Norco.  He is also getting IV morphine     5. Mucositis:  - Magic mouth wash      OK for discharge from oncology perspective. He will make f/u appointment with Dr. John Almonte, CNP  [facility]    Still significant anemia and thrombocytopenia. Hopefully discharge soon and follow up as outpatient.      Adamaris Martino MD

## 2021-03-23 NOTE — CARE COORDINATION
CM reviewed chart and spoke to [employer]. No needs at d/c.     Patient discharged 3/23/2021 to home   All discharge needs met per case management    Wes Sandifer, RN, BSN  [phone]

## 2021-03-31 NOTE — CARE COORDINATION
[facility] Transitions Follow Up Call    3/31/2021    Patient: Bettie Greenberg  Patient : 1960   MRN: [id number]  Reason for Admission: Neutropenic fever, thrush, hx of pancreatic CA  Discharge Date: 3/23/21 RARS: Readmission Risk Score: 22         Spoke with: Daughter \"Manuel\"    Care Transitions Subsequent and Final Call    Subsequent and Final Calls  Do you have any ongoing symptoms?: No  Have your medications changed?: No  Do you have any questions related to your medications?: No  Do you currently have any active services?: No  Do you have any needs or concerns that I can assist you with?: No  Identified Barriers: None  Care Transitions Interventions  Other Interventions: Follow Up: Daughter reports that father is doing well, he is feeling lightheaded at times, had chemo treatment today and followed up with Dr. Florence Kelly last week. He is afebrile and thrush is almost completely gone. He continues to use mouth rinse since he is starting chemo again. CTN will continue with outreach follow up calls.     Future Appointments   Date Time Provider Kyleigh Jacobson   2021  1:00 PM SAUNDRA Billings - CNP [place] [facility]       Estrella García RN

## 2021-05-19 NOTE — PROGRESS NOTES
Blood transfusion given per [facility]. Transfusion complete. No signs of an adverse reaction. Given avs with signs and symptoms of a delayed reaction and when to call Dr. Ken Hanson questions answered. Discharged per W/C with family member.

## 2021-05-20 PROBLEM — K12.30 MUCOSITIS: Status: ACTIVE | Noted: 2021-01-01

## 2021-05-20 PROBLEM — Z79.899 IMMUNOCOMPROMISED STATE DUE TO DRUG THERAPY (HCC): Status: ACTIVE | Noted: 2021-01-01

## 2021-05-20 PROBLEM — D70.9 NEUTROPENIA (HCC): Status: ACTIVE | Noted: 2021-01-01

## 2021-05-20 PROBLEM — R79.89 ELEVATED LACTIC ACID LEVEL: Status: ACTIVE | Noted: 2021-01-01

## 2021-05-20 PROBLEM — E83.42 HYPOMAGNESEMIA: Status: ACTIVE | Noted: 2021-01-01

## 2021-05-20 PROBLEM — B37.0 ORAL THRUSH: Status: ACTIVE | Noted: 2021-01-01

## 2021-05-20 PROBLEM — D84.821 IMMUNOCOMPROMISED STATE DUE TO DRUG THERAPY (HCC): Status: ACTIVE | Noted: 2021-01-01

## 2021-05-20 NOTE — ED PROVIDER NOTES
[address] [facility]  EMERGENCY DEPARTMENTENCOUNTER      Pt Name: Cesia Almeida  MRN: [id number]  [place] 1960  Date ofevaluation: 5/20/2021  Provider: Crystal Dahl MD    CHIEF COMPLAINT       Chief Complaint   Patient presents with    Mouth Lesions     pt with history of pancreatic cancer, actively being treated, states ongoing mouth lesions that are not getting any better- oncologist here       HPI    HISTORY OF PRESENT ILLNESS   (Location/Symptom, Timing/Onset,Context/Setting, Quality, Duration, Modifying Factors, Severity)  Note limiting factors. Cesia Almeida is a 63 y.o. male who presents to the emergency department with weakness. This is a 58-year-old male with a history of pancreatic cancer on chemotherapy who presents with foul-smelling diarrhea, increased weakness and oral lesions. The patient sees Dr. Jean Paul Wiley for his oncologist.        Sal[employer] Prime were reviewed. Review of Systems    REVIEW OF SYSTEMS    (2-9 systems for level 4, 10 or more for level 5)     Review of Systems   Constitutional: Negative for fever. Positive for weakness. HENT: Negative for rhinorrhea and sore throat. Positive for oral lesions. Eyes: Negative for redness. Respiratory: Negative for shortness of breath. Cardiovascular: Negative for chest pain. Gastrointestinal: Negative for abdominal pain. Genitourinary: Negative for flank pain. Neurological: Negative for headaches. Hematological: Negative for adenopathy. Psychiatric/Behavioral: Negative for confusion. Except as noted above the remainder of the review of systems was reviewed and negative.        PAST MEDICAL HISTORY     Past Medical History:   Diagnosis Date    Allergic rhinitis     Cancer ([facility] [address]     pancreatic    Gout     Hypertension     resolved, no medications    Prolonged emergence from general anesthesia     slow to wake, w port insertion woke up during    Type II or unspecified type diabetes mellitus without mention of complication, not stated as uncontrolled          SURGICALHISTORY       Past Surgical History:   Procedure Laterality Date    CYSTOSCOPY  06/19/2018    cysto, green light laser of prostate    ENDOSCOPY, COLON, DIAGNOSTIC      KNEE ARTHROSCOPY Left     OTHER SURGICAL HISTORY  02/26/2018    WHIPPLE and CHOLECYSECTOMY - Dr. Janeth Worthington Right 11/2/2020    REMOVAL OF PORT- A -CATHETER performed by Charmayne Nares, MD at [address] Left 11/2/2020    POWER PORT-A-CATHETER [address] [facility]    ([id number]) performed by Charmayne Nares, MD at [address] Right 2012    rotator cuff repair    SIGMOIDOSCOPY N/A 12/22/2020    SIGMOIDOSCOPY DIAGNOSTIC FLEXIBLE performed by Ji Hall MD at [address] [facility] ENDOSCOPY  02/21/2018         CURRENT MEDICATIONS       Previous Medications    ALLOPURINOL (ZYLOPRIM) 300 MG TABLET    TAKE 1 TABLET BY MOUTH EVERY DAY    ASPIRIN EC 81 MG EC TABLET    Take 1 tablet by mouth daily    BLOOD GLUCOSE MONITORING SUPPL (FREESTYLE LITE) KENTRELL    1 Device by Does not apply route 3 times daily    BLOOD GLUCOSE TEST STRIPS (FREESTYLE LITE) STRIP    1 each by In Vitro route 3 times daily    CETIRIZINE (ZYRTEC) 10 MG TABLET    Take 10 mg by mouth daily    CYCLOBENZAPRINE (FLEXERIL) 5 MG TABLET    TAKE 1 TABLET BY MOUTH 3  TIMES DAILY AS NEEDED FOR  MUSCLE SPASMS    DIGOXIN (LANOXIN) 125 MCG TABLET    Take 1 tablet by mouth daily    DILTIAZEM (CARDIZEM 12 HR) 60 MG EXTENDED RELEASE CAPSULE    Take 1 capsule by mouth 2 times daily    FREESTYLE LANCETS MISC    1 each by Does not apply route daily    HUMALOG KWIKPEN 100 UNIT/ML SOPN    INJECT 6 UNITS  SUBCUTANEOUSLY 3 TIMES  DAILY (BEFORE MEALS)    HYDROCODONE-ACETAMINOPHEN (NORCO) 5-325 MG PER TABLET    acetaminophen 325 MG / hydrocodone bitartrate 5 MG Oral Tablet     take 1 tablet by mouth every 6 hours PRN   Active    INSULIN GLARGINE (LANTUS;BASAGLAR) 100 UNIT/ML INJECTION PEN    Inject 28 Units into the skin every morning    INSULIN PEN NEEDLE 32G X 4 MM MISC    1 each by Does not apply route 4 times daily    MORPHINE (MS CONTIN) 15 MG EXTENDED RELEASE TABLET        POTASSIUM CHLORIDE (KLOR-CON M) 20 MEQ EXTENDED RELEASE TABLET    TAKE 1 TABLET BY MOUTH  TWICE DAILY    TAMSULOSIN (FLOMAX) 0.4 MG CAPSULE    Take 1 capsule by mouth 2 times daily       ALLERGIES     Patient has no known allergies. FAMILY HISTORY       Family History   Problem Relation Age of Onset    High Blood Pressure Father     Heart Disease Father         atrial fibrillation    Cancer Father         colon    Diabetes Maternal Aunt     Diabetes Paternal Grandmother     Diabetes Paternal Grandfather           SOCIAL HISTORY       Social History     Socioeconomic History    Marital status:      Spouse name: None    Number of children: None    Years of education: None    Highest education level: None   Occupational History    None   Tobacco Use    Smoking status: Never Smoker    Smokeless tobacco: Never Used   Vaping Use    Vaping Use: Never used   Substance and Sexual Activity    Alcohol use: No    Drug use: No    Sexual activity: None   Other Topics Concern    None   Social History Narrative    None     Social Determinants of Health     Financial Resource Strain:     Difficulty of Paying Living Expenses:    Food Insecurity:     Worried About Running Out of Food in the Last Year:     Ran Out of Food in the Last Year:    Transportation Needs:     Lack of Transportation (Medical):      Lack of Transportation (Non-Medical):    Physical Activity:     Days of Exercise per Week:     Minutes of Exercise per Session:    Stress:     Feeling of Stress :    Social Connections:     Frequency of Communication with Friends and Family:     Frequency of Social Gatherings with Friends and Family:     Attends radiographic images are visualized and preliminarily interpreted by the emergency physician with the below findings:    See below    Interpretation per the Radiologist below, if available at the time ofthis note: All incidental findings were discussed with the patient. XR CHEST PORTABLE   Final Result   No radiographic evidence of acute pulmonary disease.                ED BEDSIDE ULTRASOUND:   Performed by ED Physician - none    LABS:  Labs Reviewed   CBC WITH AUTO DIFFERENTIAL - Abnormal; Notable for the following components:       Result Value    WBC 0.1 (*)     RBC 2.57 (*)     Hemoglobin 7.6 (*)     Hematocrit 23.5 (*)     RDW 18.2 (*)     Platelets 13 (*)     All other components within normal limits    Narrative:     Joe Sanchez  [facility] tel. [phone],  Hematology results called to and read back by medardo price,  05/20/2021 17:50, by [facility]  Hematology results called to and read back by medardo price,  05/20/2021 17:49, by MARYLU  Performed at:  [facility]  [address], [address]   Phone [phone]   COMPREHENSIVE METABOLIC PANEL W/ REFLEX TO MG FOR LOW K - Abnormal; Notable for the following components:    Sodium 134 (*)     Glucose 351 (*)     CREATININE 0.7 (*)     Calcium 7.7 (*)     Total Protein 4.2 (*)     Albumin 2.2 (*)     Total Bilirubin 1.3 (*)     Alkaline Phosphatase 274 (*)     All other components within normal limits    Narrative:     Performed at:  [facility]  [address], [facility] [address]   Phone [phone]   LIPASE - Abnormal; Notable for the following components:    Lipase 4.0 (*)     All other components within normal limits    Narrative:     Performed at:  [facility]  [address], [facility] [address]   Phone [phone]   PROTIME-INR - Abnormal; Notable for the following components:    Protime 20.7 (*)     INR 1.77 (*)     All other components within normal limits    Narrative:     Performed at:  [facility]  [address], [email or web address]   Phone [phone]   APTT - Abnormal; Notable for the following components:    aPTT 41.6 (*)     All other components within normal limits    Narrative:     Performed at:  [facility]  [address], [email or web address]   Phone 21  - Abnormal; Notable for the following components:    Pro- (*)     All other components within normal limits    Narrative:     Performed at:  [facility] [address], [email or web address]   Phone [phone]   LACTATE, SEPSIS - Abnormal; Notable for the following components:    Lactic Acid, Sepsis 2.4 (*)     All other components within normal limits    Narrative:     Performed at:  [facility] [address], [email or web address]   Phone [phone]   MAGNESIUM - Abnormal; Notable for the following components:    Magnesium 1.70 (*)     All other components within normal limits    Narrative:     Performed at:  [facility] [address], [email or web address]   Phone [phone]   C DIFF TOXIN/ANTIGEN   TROPONIN    Narrative:     Performed at:  [facility]  [address] [place], [email or web address]   Phone [phone]   LACTATE, SEPSIS   TYPE AND SCREEN    Narrative:     Performed at:  [facility] [address]   Phone [phone]       All other labs were within normal range or not returned as of this dictation.     EMERGENCY DEPARTMENT COURSE and DIFFERENTIAL DIAGNOSIS/MDM:   Vitals:    Vitals:    05/20/21 1615 05/20/21 1630 05/20/21 1830 05/20/21 1900   BP: 134/82 (!) 148/94     Pulse: 96 93 104 114   Resp: 17 18 16 16   Temp:       TempSrc:       SpO2: 97% 100% 100% 100%   Weight:       Height:               MDM    Patient has remained stable throughout his hospital course. The patient is very ill. The patient's work-up is consistent with pancytopenia would also explain his significant bruising. He is not febrile. His white cell count was 100. He is anemic and he is thrombocytopenic. In addition, the patient be treated for thrush and I have ordered a stool for C. difficile that is pending. The patient will be admitted for further care and oncology consultation. He is in stable but serious condition. REASSESSMENT          CRITICAL CARE TIME   Total Critical Care time was 50 minutes, excluding separatelyreportable procedures. There was a high probability ofclinically significant/life threatening deterioration in the patient's condition which required my urgent intervention. CONSULTS:  IP CONSULT TO HOSPITALIST    PROCEDURES:  Unless otherwise noted below, none     Procedures    FINAL IMPRESSION      1. Pancytopenia ([address]    2. Candidiasis of mouth    3. Diarrhea, unspecified type          DISPOSITION/PLAN   DISPOSITION Decision To Admit 05/20/2021 07:36:00 PM      PATIENT REFERREDTO:  No follow-up provider specified. DISCHARGEMEDICATIONS:  New Prescriptions    No medications on file     Controlled Substances Monitoring:     RX Monitoring 1/9/2019   Attestation The Prescription Monitoring Report for this patient was reviewed today.        (Please note that portions of this note were completed with a voice recognition program.  Efforts were made to edit the dictations but occasionally words are mis-transcribed.)    Cheryl Spicer MD (electronically signed)  Attending Emergency Physician          Cheryl Spicer MD  05/20/21 9650

## 2021-05-20 NOTE — ED NOTES
Patient alert and oriented, states he has pain in his mouth. Bed locked and in lowest position. Call light within reach.       Jose Cruz Marie RN  05/20/21 3627

## 2021-05-20 NOTE — ED NOTES
Bed: 21  Expected date:   Expected time:   Means of arrival:   Comments:  Roseanna-daniel Cali RN  05/20/21 4010

## 2021-05-21 NOTE — H&P
Hospital Medicine History & Physical      PCP: Candice Sheehan MD    Date of Admission: 5/20/2021    Date of Service: Pt seen/examined on 5/20/2021  and Admitted to Inpatient with expected LOS greater than two midnights due to medical therapy. Chief Complaint:    Chief Complaint   Patient presents with    Mouth Lesions     pt with history of pancreatic cancer, actively being treated, states ongoing mouth lesions that are not getting any better- oncologist here        History Of Present Illness: The patient is a 63 y.o. male With history of hypertension, BPH, type 2 diabetes, A. fib, metastatic pancreatic cancer on chemotherapy with last session last Thursday who has been unwell for the last few days with profuse foul-smelling dark stool diarrhea and associated hematochezia generalized malaise and weakness, pain in the mouth with underlying mucositis. He denies any abdominal pains, no fevers. Also reporting chest pain/heaviness with associated shortness of breath in the past couple of days. He reports that he has been getting blood products for pancytopenia for the last few days with IV hydration as well. He has lower extremity swelling which has increased in the last few days. Given history of metastatic pancreatic cancer and leg swelling, with shortness of breath will need to rule out renal thromboembolus. We will get CT pulmonary angiogram.  He also complains of back pain and is laying on his back most of the time. He has generalized malaise with weakness, listlessness and poor energy is due to poor oral intake.   Chest x-ray with no acute pathology  EKG noted sinus tachycardia    Past Medical History:        Diagnosis Date    Allergic rhinitis     Cancer ([facility] [address]     pancreatic    Gout     Hypertension     resolved, no medications    Prolonged emergence from general anesthesia     slow to wake, w port insertion woke up during    Type II or unspecified type diabetes mellitus without mention of complication, not stated as uncontrolled        Past Surgical History:        Procedure Laterality Date    CYSTOSCOPY  06/19/2018    cysto, green light laser of prostate    ENDOSCOPY, COLON, DIAGNOSTIC      KNEE ARTHROSCOPY Left     OTHER SURGICAL HISTORY  02/26/2018    WHIPPLE and CHOLECYSECTOMY - Dr. Myriam Hollis Right 11/2/2020    REMOVAL OF PORT- A -CATHETER performed by Drusilla Boeck, MD at [address] Left 11/2/2020    POWER PORT-A-CATHETER [address] [facility]    ([id number]) performed by Drusilla Boeck, MD at [address] Right 2012    rotator cuff repair    SIGMOIDOSCOPY N/A 12/22/2020    SIGMOIDOSCOPY DIAGNOSTIC FLEXIBLE performed by Margaret Contreras MD at [address] [facility]  02/21/2018       Medications Prior to Admission:    Prior to Admission medications    Medication Sig Start Date End Date Taking?  Authorizing Provider   cetirizine (ZYRTEC) 10 MG tablet Take 10 mg by mouth daily   Yes Historical Provider, MD   digoxin (LANOXIN) 125 MCG tablet Take 1 tablet by mouth daily 2/24/21  Yes SAUNDRA Davison CNP   dilTIAZem (CARDIZEM 12 HR) 60 MG extended release capsule Take 1 capsule by mouth 2 times daily 2/24/21  Yes SAUNDRA Davison CNP   morphine (MS CONTIN) 15 MG extended release tablet  1/26/21  Yes Historical Provider, MD   HYDROcodone-acetaminophen (NORCO) 5-325 MG per tablet acetaminophen 325 MG / hydrocodone bitartrate 5 MG Oral Tablet     take 1 tablet by mouth every 6 hours PRN   Active 1/20/21  Yes Historical Provider, MD   cyclobenzaprine (FLEXERIL) 5 MG tablet TAKE 1 TABLET BY MOUTH 3  TIMES DAILY AS NEEDED FOR  MUSCLE SPASMS 11/12/20  Yes Danica Valadez MD   HUMALOG KWIKPEN 100 UNIT/ML SOPN INJECT 6 UNITS  SUBCUTANEOUSLY 3 TIMES  DAILY (BEFORE MEALS)  Patient taking differently: Using a sliding scale on this 11/12/20  Yes Danica Valadez MD potassium chloride (KLOR-CON M) 20 MEQ extended release tablet TAKE 1 TABLET BY MOUTH  TWICE DAILY 11/12/20  Yes Maday Woodruff MD   tamsulosin [employer]) 0.4 MG capsule Take 1 capsule by mouth 2 times daily 9/2/20  Yes Micheal Kim MD   allopurinol (ZYLOPRIM) 300 MG tablet TAKE 1 TABLET BY MOUTH EVERY DAY 9/2/20  Yes Micheal Kim MD   insulin glargine (LANTUS;BASAGLAR) 100 UNIT/ML injection pen Inject 28 Units into the skin every morning  Patient taking differently: Inject 33 Units into the skin every morning  8/21/20  Yes Micheal Kim MD   Blood Glucose Monitoring Suppl (FREESTYLE LITE) KENTRELL 1 Device by Does not apply route 3 times daily 4/29/20  Yes Micheal Kim MD   blood glucose test strips (FREESTYLE LITE) strip 1 each by In Vitro route 3 times daily 4/29/20  Yes Micheal Kim MD   FreeStyle Lancets MISC 1 each by Does not apply route daily 4/29/20  Yes Micheal Kim MD   Insulin Pen Needle 32G X 4 MM MISC 1 each by Does not apply route 4 times daily 10/15/19  Yes Micheal Kim MD   aspirin EC 81 MG EC tablet Take 1 tablet by mouth daily 11/11/16  Yes Micheal Kim MD   ibuprofen (ADVIL;MOTRIN) 800 MG tablet TAKE 1 TABLET BY MOUTH 3 TIMES DAILY WITH MEALS 8/21/20 3/19/21  Micheal Kim MD       Allergies:  Patient has no known allergies. Social History:  The patient currently lives with family    TOBACCO:   reports that he has never smoked. He has never used smokeless tobacco.  ETOH:   reports no history of alcohol use. Family History:  Reviewed in detail and negative for DM, Early CAD, Cancer, CVA.  Positive as follows:        Problem Relation Age of Onset    High Blood Pressure Father     Heart Disease Father         atrial fibrillation    Cancer Father         colon    Diabetes Maternal Aunt     Diabetes Paternal Grandmother     Diabetes Paternal Grandfather        REVIEW OF SYSTEMS:   Positive for generalized malaise weakness, chest pains or

## 2021-05-21 NOTE — PLAN OF CARE
Nutrition Problem #1: Increased nutrient needs  Intervention: Food and/or Nutrient Delivery: Continue Current Diet, Start Oral Nutrition Supplement  Nutritional Goals: PO 50% at meals and supp

## 2021-05-21 NOTE — ED NOTES
Nursing report called BRAVO Castillo on receiving unit. RN accepts patient and has no further questions.      Catherine Rod RN  05/20/21 3176

## 2021-05-21 NOTE — PROGRESS NOTES
Hospital Medicine Progress Note     Date:  5/21/2021    PCP: Yovana Pulliam MD (Tel: [phone])    Date of Admission: 5/20/2021    Subjective  Patient feels worn out and fatigued. Objective  Physical exam:  Vitals: /81   Pulse 89   Temp 98.2 °F (36.8 °C) (Oral)   Resp 16   Ht 5' 8\" (1.727 m)   Wt 196 lb 6.4 oz (89.1 kg)   SpO2 98%   BMI 29.86 kg/m²   Gen: Not in distress. Alert. Frail, appears chronically ill  Head: Normocephalic. Atraumatic. Eyes: EOMI. Good acuity. ENT: Oral mucosa moist  Neck: No JVD. No obvious thyromegaly. CVS: Nml S1S2, no MRG, RRR  Pulmomary: Clear bilaterally. No crackles. No wheezes. Gastrointestinal: Soft, NT/D. Positive bowel sounds. , + ascites  Musculoskeletal: Bilateral lower extremity 3 + pitting edema, mild tenderness to palpation, no erythema  Neuro: No focal deficit. Moves extremity spontaneously. Psychiatry: Appropriate affect. Not agitated. Skin: Warm, dry with normal turgor. No rash      24HR INTAKE/OUTPUT:      Intake/Output Summary (Last 24 hours) at 5/21/2021 1614  Last data filed at 5/21/2021 0646  Gross per 24 hour   Intake 153.2 ml   Output --   Net 153.2 ml     I/O last 3 completed shifts: In: 153.2 [I.V.:17; IV Piggyback:136.2]  Out: -   No intake/output data recorded.       Meds:    pantoprazole  40 mg Oral QAM AC    psyllium  1 packet Oral Daily    aspirin EC  81 mg Oral Daily    digoxin  125 mcg Oral Daily    dilTIAZem  60 mg Oral BID    insulin glargine  14 Units Subcutaneous QAM    morphine  15 mg Oral 3 times per day    tamsulosin  0.4 mg Oral BID    sodium chloride flush  5-40 mL Intravenous 2 times per day    insulin lispro  0-6 Units Subcutaneous TID WC    insulin lispro  0-3 Units Subcutaneous Nightly       Infusions:    sodium chloride      sodium chloride      sodium chloride      sodium chloride      sodium chloride 75 mL/hr at 05/21/21 0931    dextrose           PRN Meds: sodium chloride, sodium chloride, potassium chloride **OR** potassium alternative oral replacement **OR** potassium chloride, magic (miracle) mouthwash with nystatin, sodium chloride flush, sodium chloride, promethazine **OR** ondansetron, polyethylene glycol, acetaminophen **OR** acetaminophen, morphine **OR** morphine, glucose, dextrose, glucagon (rDNA), dextrose    Labs/imaging:  CBC:   Recent Labs     05/20/21  1600 05/21/21  0620   WBC 0.1* 0.1*   HGB 7.6* 6.6*   PLT 13* 11*         BMP:    Recent Labs     05/20/21  1600 05/21/21  0620   * 132*   K 3.5 3.3*    101   CO2 24 26   BUN 12 9   CREATININE 0.7* 0.5*   GLUCOSE 351* 269*         Hepatic:   Recent Labs     05/20/21  1600   AST 21   ALT 20   BILITOT 1.3*   ALKPHOS 274*       Troponin:   Recent Labs     05/20/21  1600   TROPONINI <0.01         BNP: No results for input(s): BNP in the last 72 hours. INR:   Recent Labs     05/20/21  1600   INR 1.77*           Reviewed imaging and reports noted      Assessment:  Active Problems:    Essential hypertension    Diarrhea    Pancytopenia (HCC)    Type 2 diabetes mellitus with other specified complication (HCC)    Metastatic Pancreatic cancer     Mucositis    Oral thrush    Neutropenia (HCC)    Hypomagnesemia    Elevated lactic acid level    Immunocompromised state due to drug therapy  Resolved Problems:    * No resolved hospital problems.  *        Plan:  Severe diarrhea  -Appreciate GI recommendations  -C. difficile negative, GI bacterial pathogens pending  -Fecal leukocytes negative  -Continue psyllium and supportive care  -CT abdomen pelvis revealing preaortic necrotic appearing lymph node, increasing amount of intra-abdominal ascites, moderate left pleural effusion and small right pleural effusion      Worsening bilateral lower extremity edema  -Check bilateral lower extremity venous Dopplers and echo  -Give IV Lasix with albumin every 12 hours x 2  -Wrap bilateral lower extremities with Ace wrap    Metastatic pancreatic adenocarcinoma  -Appreciate oncology recommendations      Pancytopenia  -Appreciate oncology recommendations   -Patient to get 1 unit platelets and 2 units packed red blood cells today  -Continue to monitor      Neoplasm related pain  -Stable, CPM        Insulin-dependent diabetes mellitus type 2  -Blood sugars v ariable  - Cont lantus 10U QAM, sliding scale insulin low, CTM    Neutropenia  -Continue neutropenic precautions            Diet: DIET FULL LIQUID;  Dietary Nutrition Supplements: Diabetic Oral Supplement  Dietary Nutrition Supplements: Other Oral Supplement (see comment)    Activity: up with assist  Prophylaxis: SCD    Code status: Full Code     ----------        Nia Mott MD  -------------------------------  Rounding hospitalist

## 2021-05-21 NOTE — CONSULTS
[facility]   Consult Note      5/21/2021 8:27 AM        Name: Shiela Lezama . Admitted: 5/20/2021    HPI:  Anupama Peña is a patient of Dr. Theresa Salmon, originally diagnosed with Stage IIB, [id number], moderately differentiated adenocarcinoma of the pancreatic head. 2 out of 14 lymph nodes involved, perineural invasion present, uncinate margin positive. Status post Whipple procedure on 2/26/2018. Status post removal of hepatojejunostomy stent. 6 months of gemcitabine and capecitabine finished on 11/21/18. Finished radiation with Xeloda (1000 mg bid during RT) on 2/22/19. MSI stable. CT from 7/2019 consistent with progression. Started Gemzar and Abraxane 7/23/2019. CT scans from 10/2020 with progression. He began on liposomal irinotecan, 5-FU and leucovorin on 11/10/2020. After 3 cycles, he was admitted with diarrhea and mucositis. Started FOLFOX with neulasta support 2/2/2021. CA 19-9 has dropped with treatment. He continued to have  Neutropenia between treatments despite Neulasta  Last chemo treatment was on 5/11/21, doses were again reduced. He received blood and platelet transfusion on 5/19/21 as outpatient for anemia and TCP. He presented to the ED with c/o dark, foul smelling diarrhea, generalized malaise and weakness. Negative for C-diff.       Reviewed interval ancillary notes    Current Medications  0.9 % sodium chloride infusion, PRN  0.9 % sodium chloride infusion, PRN  magic (miracle) mouthwash with nystatin, 4x Daily PRN  aspirin EC tablet 81 mg, Daily  digoxin (LANOXIN) tablet 125 mcg, Daily  dilTIAZem (CARDIZEM 12 HR) extended release capsule 60 mg, BID  insulin glargine (LANTUS;BASAGLAR) injection pen 14 Units, QAM  morphine (MS CONTIN) extended release tablet 15 mg, 3 times per day  tamsulosin (FLOMAX) capsule 0.4 mg, BID  sodium chloride flush 0.9 % injection 5-40 mL, 2 times per day  sodium chloride flush 0.9 % injection 5-40 mL, PRN  0.9 % sodium chloride infusion, PRN  promethazine (PHENERGAN) tablet 12.5 mg, Q6H PRN   Or  ondansetron (ZOFRAN) injection 4 mg, Q6H PRN  polyethylene glycol (GLYCOLAX) packet 17 g, Daily PRN  acetaminophen (TYLENOL) tablet 650 mg, Q6H PRN   Or  acetaminophen (TYLENOL) suppository 650 mg, Q6H PRN  morphine (PF) injection 2 mg, Q2H PRN   Or  morphine injection 4 mg, Q2H PRN  0.9 % sodium chloride infusion, Continuous  glucose (GLUTOSE) 40 % oral gel 15 g, PRN  dextrose 50 % IV solution, PRN  glucagon (rDNA) injection 1 mg, PRN  dextrose 5 % solution, PRN  insulin lispro (1 Unit Dial) 0-6 Units, TID WC  insulin lispro (1 Unit Dial) 0-3 Units, Nightly        Objective:  /82   Pulse 91   Temp 97.4 °F (36.3 °C) (Oral)   Resp 16   Ht 5' 8\" (1.727 m)   Wt 196 lb 6.4 oz (89.1 kg)   SpO2 99%   BMI 29.86 kg/m²     Intake/Output Summary (Last 24 hours) at 5/21/2021 0827  Last data filed at 5/21/2021 0646  Gross per 24 hour   Intake 153.2 ml   Output --   Net 153.2 ml      Wt Readings from Last 3 Encounters:   05/20/21 196 lb 6.4 oz (89.1 kg)   03/19/21 187 lb 8 oz (85 kg)   02/24/21 174 lb (78.9 kg)       General appearance:  No acute distress  Eyes: Sclera clear. Pupils equal.  ENT: Moist oral mucosa. Trachea midline, no adenopathy. Cardiovascular: Regular rhythm, normal S1, S2. No murmur. No edema in lower extremities  Respiratory: Not using accessory muscles. Good inspiratory effort. Clear to auscultation bilaterally, no wheeze or crackles. GI: Abdomen soft, no tenderness, not distended  Musculoskeletal: No cyanosis in digits, neck supple  Neurology: CN 2-12 grossly intact. No speech or motor deficits  Psych: Normal affect.  Alert and oriented in time, place and person  Skin: Warm, dry, normal turgor    Labs and Tests:  CBC:   Recent Labs     05/20/21  1600 05/21/21  0620   WBC 0.1* 0.1*   HGB 7.6* 6.6*   PLT 13* 11*     BMP:    Recent Labs     05/20/21  1600 05/21/21  0620   * 132*   K 3.5 3.3*    101 CO2 24 26   BUN 12 9   CREATININE 0.7* 0.5*   GLUCOSE 351* 269*     Hepatic:   Recent Labs     05/20/21  1600   AST 21   ALT 20   BILITOT 1.3*   ALKPHOS 274*       ASSESSMENT AND PLAN    Active Problems:    Essential hypertension    Diarrhea    Pancytopenia (HCC)    Type 2 diabetes mellitus with other specified complication (HCC)    Metastatic Pancreatic cancer     Mucositis    Oral thrush    Neutropenia (HCC)    Hypomagnesemia    Elevated lactic acid level    Immunocompromised state due to drug therapy  Resolved Problems:    * No resolved hospital problems. *      Pancreatic carcinoma  - previous treatment as stated above  - currently receiving FOLFOX, last tx 5/11/21/21 with Neulasta  - he has had a difficult time tolerating chemotherapy even at reduced doses, chemotherapy will be held for now, discussed with patient. Pancytopenia  - d/t chemotherapy  - neutropenia without fever  - s/p Neulasta on 5/11/21  - hgb 6.6, two units pRBCs ordered  - plts 11, plt transfusion ordered     Diarrhea  - likely d/t chemotherapy  - C-diff negative, fecal leukocytes negative, other stool testing pending  - GI consult pending    Hypokalemia  - replace per protocol    Hypomagnesemia  - magnesium replacement ordered    Theo Bars, [address] [facility]  [facility]    Patient was seen with VLAD in the morning. Feeling weak. Severe myelosuppression from chemotherapy. Severe anemia and thrombocytopenia with profound neutropenia. Recent chemotherapy followed by Neulasta. Also hypokalemia and hypomagnesemia. Will receive packed red blood cell and platelet transfusion. At this point we will hold chemotherapy and continue supportive care. Explained to the patient. Megan Sorto.  Jean Paul Wiley MD, [place] [address]  Hematology and Oncology  [facility]  [phone]

## 2021-05-21 NOTE — CONSULTS
DIAGNOSTIC      KNEE ARTHROSCOPY Left     OTHER SURGICAL HISTORY  02/26/2018    WHIPPLE and CHOLECYSECTOMY - Dr. Sofia Haider Right 11/2/2020    REMOVAL OF PORT- A -CATHETER performed by Rachael Long MD at [address] [place] [address] Left 11/2/2020    POWER PORT-A-CATHETER [address] [facility]    ([id number]) performed by Rachael Long MD at [address] [facility] E Right 2012    rotator cuff repair    SIGMOIDOSCOPY N/A 12/22/2020    SIGMOIDOSCOPY DIAGNOSTIC FLEXIBLE performed by Mandy Sutherland MD at [address] ENDOSCOPY  02/21/2018      Past Endoscopic History  Flex sig with Dr Brendon Dennis 12/2020 for hematochezia  Signed             Flex sig:  Normal colored stool in the rectum and sigmoid. Unprepped. Active bleeding was noted from the anal canal.  It appeared that there were small anal fissures and small hemorrhoids.          Rec:   Fiber supplement             Can apply topical hydrocortisone x 1 wk. [id number] [address] for CiRBA. He had a colonoscopy in 2018 with polyps and hemorrhoids and is due for repeat screening colonoscopy. Admission Meds  No current facility-administered medications on file prior to encounter.      Current Outpatient Medications on File Prior to Encounter   Medication Sig Dispense Refill    cetirizine (ZYRTEC) 10 MG tablet Take 10 mg by mouth daily      dilTIAZem (CARDIZEM 12 HR) 60 MG extended release capsule Take 1 capsule by mouth 2 times daily 180 capsule 1    morphine (MS CONTIN) 15 MG extended release tablet       HUMALOG KWIKPEN 100 UNIT/ML SOPN INJECT 6 UNITS  SUBCUTANEOUSLY 3 TIMES  DAILY (BEFORE MEALS) (Patient taking differently: Using a sliding scale on this) 15 mL 3    potassium chloride (KLOR-CON M) 20 MEQ extended release tablet TAKE 1 TABLET BY MOUTH  TWICE DAILY 180 tablet 3    tamsulosin (FLOMAX) 0.4 MG capsule Take 1 capsule by mouth 2 times daily 180 capsule 1    allopurinol (ZYLOPRIM) 300 MG tablet TAKE 1 TABLET BY MOUTH EVERY DAY 90 tablet 1    Blood Glucose Monitoring Suppl (FREESTYLE LITE) KENTRELL 1 Device by Does not apply route 3 times daily 1 Device 0    blood glucose test strips (FREESTYLE LITE) strip 1 each by In Vitro route 3 times daily 300 each 3    FreeStyle Lancets MISC 1 each by Does not apply route daily 100 each 3    Insulin Pen Needle 32G X 4 MM MISC 1 each by Does not apply route 4 times daily 200 each 3    aspirin EC 81 MG EC tablet Take 1 tablet by mouth daily 30 tablet 11    digoxin (LANOXIN) 125 MCG tablet Take 1 tablet by mouth daily 90 tablet 1    HYDROcodone-acetaminophen (NORCO) 5-325 MG per tablet acetaminophen 325 MG / hydrocodone bitartrate 5 MG Oral Tablet     take 1 tablet by mouth every 6 hours PRN   Active      cyclobenzaprine (FLEXERIL) 5 MG tablet TAKE 1 TABLET BY MOUTH 3  TIMES DAILY AS NEEDED FOR  MUSCLE SPASMS 270 tablet 0    insulin glargine (LANTUS;BASAGLAR) 100 UNIT/ML injection pen Inject 28 Units into the skin every morning (Patient taking differently: Inject 33 Units into the skin every morning ) 8 pen 3    [DISCONTINUED] ibuprofen (ADVIL;MOTRIN) 800 MG tablet TAKE 1 TABLET BY MOUTH 3 TIMES DAILY WITH MEALS 270 tablet 0       Allergies  No Known Allergies   Social   Social History     Tobacco Use    Smoking status: Never Smoker    Smokeless tobacco: Never Used   Substance Use Topics    Alcohol use: No        Family History   Problem Relation Age of Onset    High Blood Pressure Father     Heart Disease Father         atrial fibrillation    Cancer Father         colon    Diabetes Maternal Aunt     Diabetes Paternal Grandmother     Diabetes Paternal Grandfather          Review of Systems  Pertinent items are noted in HPI. Physical Exam  Blood pressure 113/74, pulse 97, temperature 98.3 °F (36.8 °C), temperature source Oral, resp.  rate 16, height 5' 8\" (1.727 m), weight 196 lb 6.4 oz (89.1 kg), SpO2 99 %. General appearance: alert, cooperative, no distress, appears stated age  Eyes: Anicteric  Head: Normocephalic, without obvious abnormality  Lungs: clear to auscultation bilaterally, Normal Effort  Heart: regular rate and rhythm, normal S1 and S2, no murmurs or rubs  Abdomen: soft, non-tender. Bowel sounds normal. No masses,  no organomegaly. Extremities: atraumatic, no cyanosis or edema  Skin: warm and dry, no jaundice  Neuro: Grossly intact, A&OX3  Musculoskeletal: 5/5  strength BUE      Data Review:    Recent Labs     05/20/21  1600 05/21/21  0620   WBC 0.1* 0.1*   HGB 7.6* 6.6*   HCT 23.5* 20.1*   MCV 91.2 90.1   PLT 13* 11*     Recent Labs     05/20/21  1600 05/21/21  0620   * 132*   K 3.5 3.3*    101   CO2 24 26   BUN 12 9   CREATININE 0.7* 0.5*     Recent Labs     05/20/21  1600   AST 21   ALT 20   BILITOT 1.3*   ALKPHOS 274*     Recent Labs     05/20/21  1600   LIPASE 4.0*     Recent Labs     05/20/21  1600   PROTIME 20.7*   INR 1.77*     No results for input(s): PTT in the last 72 hours. No results for input(s): OCCULTBLD in the last 72 hours. Imaging Studies:                               CT-scan of  Chest w contrast 5/20/21  Impression   1. No evidence of pulmonary embolism. 2. Patchy left upper lobe airspace opacities are suspicious for pneumonia. Imaging features are atypical or uncommonly reported for COVID-19 pneumonia. Alternative diagnoses should be considered. PneAty   3. Small bilateral pleural effusions. 4. Coronary artery disease. 5. Small volume upper abdominal ascites and marked hepatic steatosis. 6. Increased size of bilateral thyroid nodules which now measure up to 2.9   cm.  Suggest correlation with nonemergent thyroid ultrasound.                       Assessment:     Active Problems:    Essential hypertension    Diarrhea    Pancytopenia (HCC)    Type 2 diabetes mellitus with other specified complication [facility])    Metastatic Pancreatic cancer     Mucositis    Oral thrush    Neutropenia (HCC)    Hypomagnesemia    Elevated lactic acid level    Immunocompromised state due to drug therapy  Resolved Problems:    * No resolved hospital problems. *    Diarrhea  -began after chemo so could be due to this. He is 11 days post chemo. stool negative for C.diff. Other infectious stool studies labs are pending. Pancytopenia -due to chemo. Patient reported black stool but last stool charted here was brown. Metastatic pancreatic adenocarcinoma    Recommendations:   - f/u Stool GI pathogens and EIA  - PPI  - fiber daily  - CT abd/pelvis  - consider endoscopic eval if above negative and diarrhea persists but would need to wait until pancytopenia improves     Discussed with Dr. Luis Armando Feliciano, PA-C  [facility]    I have personally performed a face to face diagnostic evaluation on this patient. I have interviewed and examined the patient and I agree with the findings and recommended plan of care. In summary, my findings and plan are the following: pancreatic ca on chemo, now severe pancytopenia and diarrhea, abd soft, c diff neg, ct scan no severe colitis, rec cont supportive care s/p chemo/further chemo on hold, f/u other stool studies, fiber to bulk stools, hold off flex sig/biopsy acutely with severe pancytopenia.       Marcy Kim MD  [address]

## 2021-05-21 NOTE — PROGRESS NOTES
Comprehensive Nutrition Assessment    Type and Reason for Visit:  Initial, Positive Nutrition Screen    Nutrition Recommendations/Plan:   1. Continue current diet  2. RD ordered Glucerna TID - strawberry    Nutrition Assessment:  Pt is has increased nutrient needs d/t stage 1 pressure ulcer on L buttock. Pt also on chemo for pancreatic cancer. Pt has ulcers/sores in mouth and is unable to eat much. Pt states that when he tries to eat he has discomfort/burning. Pt reports wt fluctuates d/t this. Pt's wt ranges between 180 to 200lbs. Pt is open to a supplement Glucerna. RD to order supp TID. Malnutrition Assessment:  Malnutrition Status:  No malnutrition    Context:  Chronic Illness     Findings of the 6 clinical characteristics of malnutrition:  Energy Intake:  Mild decrease in energy intake (Comment) (poor intake d/t mouth sores)  Weight Loss:  No significant weight loss     Body Fat Loss:  No significant body fat loss     Muscle Mass Loss:  No significant muscle mass loss    Fluid Accumulation:  1 - Mild Extremities   Strength:  Not Performed    Estimated Daily Nutrient Needs:  Energy (kcal):  2241-5529 cals (20-25 cals/89kg); Weight Used for Energy Requirements:  Current     Protein (g):   gms (1.3-2.0 gms/70 kg IBW); Weight Used for Protein Requirements:  Ideal        Fluid (ml/day):   ; Method Used for Fluid Requirements:  1 ml/kcal      Nutrition Related Findings:  BLE +1. K 3.3L, Na 132L. I/O's: +153      Wounds:  Stage I, Pressure Injury       Current Nutrition Therapies:    DIET FULL LIQUID;  Dietary Nutrition Supplements: Diabetic Oral Supplement  Dietary Nutrition Supplements: Other Oral Supplement (see comment)    Anthropometric Measures:  · Height: 5' 8\" (172.7 cm)  · Current Body Weight: 196 lb (88.9 kg)   · Ideal Body Weight: 154 lbs; % Ideal Body Weight     · BMI: 29.8  · BMI Categories: Overweight (BMI 25.0-29. 9)       Nutrition Diagnosis:   · Increased nutrient needs related to

## 2021-05-22 NOTE — PROGRESS NOTES
2nd unit of blood completed. Pt. Tolerated well. VSS throughout infusion. Throughout shift pt. Noted with low grade fever. Treated with tylenol d/t c/o of being severely cold. Pt. Noted with multiple stools. Stools soft. Not loose. Pt. Completed vascular studies of BLE. 4 bags of k+ completed prior to handoff. Pt. Resting in bed at this time.
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
## 2021-05-22 NOTE — FLOWSHEET NOTE
MD notified of multiple soft stools. Pt. stooling continuously every 10-15 mins. Pt. Becoming incontinent. Awaiting response.         05/22/21 1539   Provider Notification   Reason for Communication New orders   Provider Name Dr. Amy Hubbard   Provider Notification Physician   Method of Communication Secure Message   Response Waiting for response   Notification Time [phone]

## 2021-05-22 NOTE — PROGRESS NOTES
Patient's /75, , and temp 101.3, and pt very lethargic after platelet infusion completed. Notified Dr. Mónica Mackey, he stated that patient could be having transfusion reaction, received order to give tylenol , monitor VS closely and check blood sugar level. Blood sugar level was 63. Pt too lethargic to drink juice, gave 1/2 amp D50%, blood sugar level came up to 138. VS closely monitored and recorded every 15 minutes, /70, , temp 99.1 at 1750. Dr. Mónica Mackey at bedside at 31 75 62, update given to Dr. Mónica Mackey.   Okay to proceed with blood transfusion as ordered per Dr. Mónica Mackey, insulin doses adjusted also

## 2021-05-22 NOTE — PROGRESS NOTES
Gastroenterology Progress Note    Herminio Gifford is a 63 y.o. male patient. 1. Pancytopenia ([address]    2. Candidiasis of mouth    3. Diarrhea, unspecified type        Admission Date: 2021  Hospital Day: Hospital Day: 3  Attending: Lala Oglesby DO  Date of service: 21    SUBJECTIVE:    Stomach gurgles and cramps before BM   Stools are non bloody and green but same volume  He is able to drink but not eat due to sores in his mouth     ROS:  Cardiovascular ROS: no chest pain or dyspnea on exertion  Gastrointestinal ROS: see above  Respiratory ROS: no cough, shortness of breath, or wheezing    Physical    VITALS:  /74   Pulse 80   Temp 98.1 °F (36.7 °C) (Temporal)   Resp 18   Ht 5' 8\" (1.727 m)   Wt 196 lb 6.4 oz (89.1 kg)   SpO2 97%   BMI 29.86 kg/m²   TEMPERATURE:  Current - Temp: 98.1 °F (36.7 °C); Max - Temp  Av.8 °F (37.1 °C)  Min: 97.7 °F (36.5 °C)  Max: 101.3 °F (38.5 °C)    NAD  RRR, Nl s1s2  Lungs CTA Bilaterally, normal effort  Abdomen soft, ND, NT, no HSM, Bowel sounds normal  AAOx3, No asterixis     Data      CBC:   Recent Labs     21  1600 21  0620 21  0431   WBC 0.1* 0.1* 0.1*   RBC 2.57* 2.23* 2.51*   HGB 7.6* 6.6* 7.4*   HCT 23.5* 20.1* 22.2*   PLT 13* 11* 16*   MCV 91.2 90.1 88.7   MCH 29.3 29.8 29.6   MCHC 32.2 33.0 33.4   RDW 18.2* 18.1* 16.8*        BMP:  Recent Labs     21  1600 21  0620 21  0431   * 132* 133*   K 3.5 3.3* 2.7*    101 101   CO2 24 26 27   BUN 12 9 6*   CREATININE 0.7* 0.5* 0.6*   CALCIUM 7.7* 7.7* 7.7*   GLUCOSE 351* 269* 50*        Hepatic Function Panel:   Recent Labs     21  1600 21  0431   AST 21 19   ALT 20 17   BILITOT 1.3* 1.8*   ALKPHOS 274* 171*       Recent Labs     21  1600   LIPASE 4.0*     Recent Labs     21  1600   PROTIME 20.7*   INR 1.77*     No results for input(s): PTT in the last 72 hours.   No results for input(s): OCCULTBLD in the last 72 to this. He is 11 days post chemo. stool studies including for C.diff, were negative. Pancytopenia -due to chemo. Patient reported black stool but last stool charted here was brown. Metastatic pancreatic adenocarcinoma.  CT A/P 5/21 showing possible progression of disease, more pleural effusions and ascites      Recommendations:   - continue supportive care  - PPI  - fiber daily  - check CMV PCR  - we will consider endoscopic if diarrhea persists (but would need to wait until pancytopenia improves)      Candelaria Youssef MD, MSc  Michael Rod  05/22/21

## 2021-05-22 NOTE — PROGRESS NOTES
and reactive to light. Conjunctivae/corneas clear. Neck: Supple, with full range of motion. No jugular venous distention. Trachea midline. Respiratory:  Normal respiratory effort. Clear to auscultation, bilaterally without Rales/Wheezes/Rhonchi. Cardiovascular: Regular rate and rhythm with normal S1/S2 without murmurs, rubs or gallops. Abdomen: Soft, non-tender, non-distended with normal bowel sounds. Musculoskeletal: No clubbing, cyanosis or edema bilaterally. Full range of motion without deformity. Skin: Skin color, texture, turgor normal.  Ecchymosis present on the back as well as bilateral upper extremities  Neurologic:  Neurovascularly intact without any focal sensory/motor deficits.  Cranial nerves: II-XII intact, grossly non-focal.  Psychiatric: Alert and oriented, thought content appropriate, normal insight    Labs:   Recent Labs     05/20/21 1600 05/21/21  0620 05/22/21  0431   WBC 0.1* 0.1* 0.1*   HGB 7.6* 6.6* 7.4*   HCT 23.5* 20.1* 22.2*   PLT 13* 11* 16*     Recent Labs     05/20/21  1600 05/21/21  0620 05/22/21  0431   * 132* 133*   K 3.5 3.3* 2.7*    101 101   CO2 24 26 27   BUN 12 9 6*   CREATININE 0.7* 0.5* 0.6*   CALCIUM 7.7* 7.7* 7.7*     Recent Labs     05/20/21  1600 05/22/21  0431   AST 21 19   ALT 20 17   BILITOT 1.3* 1.8*   ALKPHOS 274* 171*     Recent Labs     05/20/21  1600   INR 1.77*     Recent Labs     05/20/21  1600   TROPONINI <0.01       Assessment/Plan:    Active Hospital Problems    Diagnosis Date Noted    Mucositis [K12.30] 05/20/2021    Oral thrush [B37.0] 05/20/2021    Neutropenia ([facility] [D70.9] 05/20/2021    Hypomagnesemia [E83.42] 05/20/2021    Elevated lactic acid level [R79.89] 05/20/2021    Immunocompromised state due to drug therapy [D84.821, Z79.899] 05/20/2021    Metastatic Pancreatic cancer  [C25.9] 03/19/2021    Type 2 diabetes mellitus with other specified complication ([facility] [H41.41] 01/09/2019    Pancytopenia ([facility] [H18.921] 08/29/2018    Diarrhea [R19.7] 05/14/2018    Essential hypertension [I10] 10/24/2012     1. Acute noninfectious colitis -negative for C. difficile. GI panel is negative. Likely due to chemotherapy. GI recommendations appreciated. 2.  Metastatic pancreatic adenocarcinoma status post Whipple procedure -recent CT abdomen and pelvis does show preaortic necrotic lymph nodes, abdominal ascites, and bilateral pleural effusions. Will discuss with oncology  3. Hypokalemia and hypomagnesemia -replace electrolytes and recheck levels in the a.m.  4.  Pancytopenia status post 1 platelet and 2 PRBCs -follow-up with oncology recommendations    DVT Prophylaxis: SCDs bilaterally  Diet: Dietary Nutrition Supplements: Diabetic Oral Supplement  DIET FULL LIQUID; Carb Control: 4 carb choices (60 gms)/meal  Dietary Nutrition Supplements: Other Oral Supplement (see comment)  Code Status: Full Code    PT/OT Eval Status:  On board    Dispo -pending medical course  [address] [facility], DO

## 2021-05-23 NOTE — CONSULTS
SW aware of Hospice consult. Referral sent to [address] ([address] [place] [address]). SW spoke to [address] [place] [address] RN Inés Peguero ([phone]) who plans to reach out to patient/family. AddendumOdessia Evans with [address] [place] [address] plans to meet with patient's daughters tomorrow morning @ 10 AM. Plan for family to take patient home with hospice. Hospital bed will be delivered tomorrow.     Michael GALLAGHER, [facility]

## 2021-05-23 NOTE — PROGRESS NOTES
Called and discussed with daughter, Ms. Aidee Mcdonald, at around 0620 hours. Updated her about the development, my discussion with neurosurgeon and plan for platelet transfusion, that he may need plasma transfusion as well, depending on INR level. Also updated on plan to transfer to ICU for closer monitoring. Her questions were answered. She will be here soon.

## 2021-05-23 NOTE — PROGRESS NOTES
Follow up. Discussed with neurosurgeon, Dr. Kath Santos, at [facility] NO 5 AM.    Recommends transfusing platelet as currently underway. Checking INR - if elevated, give vitamin K and FFPs. Repeat CT-head in 6 hours (from initial imaging, at 10:30AM). If there's change in mental status, obtain stat repeat CT-head. Discussed with Dr. Kath Santos, neurosurgeon, at [place] [address] transferring to [facility] stat if there's a change in mental status. Result of repeat CT-head to be discussed with Dr. Kath Santos when available. Will transfer patient to ICU for close monitoring.
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
## 2021-05-23 NOTE — PROGRESS NOTES
Patient sustained a fall in room. According to patient, he was trying to reach for telephone when he fell, landed face flat on the floor. At time of my evaluation, he has forehead swelling (slightly to the left) concerning for hematoma. His other complaint is chronic back pain that he states he has had for years. He is alert and oriented. Will obtain CT-head without contrast to rule out intracranial bleed. Maintain on fall precautions.

## 2021-05-23 NOTE — PROGRESS NOTES
Gastroenterology Progress Note    Cesia Almeida is a 63 y.o. male patient. 1. Pancytopenia ([address]    2. Candidiasis of mouth    3. Diarrhea, unspecified type        Admission Date: 2021  Hospital Day: Hospital Day: 4  Attending: Cortez Deras MD  Date of service: 21    SUBJECTIVE:    Sleeping comfortably  Daughter at bedside, patient continues to have diarrhea  Events noted. Patient fell off his bed yesterday   CT brain - acute left front & left middle cerebral artery cistern hemorrhage, right sided acute on chronic subdural hematoma   Neurosurgery on board, transfusing platelets due to severe thrombocytopenia      ROS:  Unable to elicit     Physical    VITALS:  /76   Pulse 96   Temp 99.2 °F (37.3 °C) (Oral)   Resp 14   Ht 5' 8\" (1.727 m)   Wt 196 lb 6.4 oz (89.1 kg)   SpO2 97%   BMI 29.86 kg/m²   TEMPERATURE:  Current - Temp: 99.2 °F (37.3 °C);  Max - Temp  Av °F (37.2 °C)  Min: 97.9 °F (36.6 °C)  Max: 99.8 °F (37.7 °C)    NAD  RRR, Nl s1s2  Lungs CTA Bilaterally, normal effort  Abdomen soft, ND, NT, no HSM, Bowel sounds normal    Data      CBC:   Recent Labs     21  1600 21  0620 21  0431   WBC 0.1* 0.1* 0.1*   RBC 2.57* 2.23* 2.51*   HGB 7.6* 6.6* 7.4*   HCT 23.5* 20.1* 22.2*   PLT 13* 11* 16*   MCV 91.2 90.1 88.7   MCH 29.3 29.8 29.6   MCHC 32.2 33.0 33.4   RDW 18.2* 18.1* 16.8*        BMP:  Recent Labs     21  1600 21  0620 21  0431 21  1950   * 132* 133*  --    K 3.5 3.3* 2.7* 3.0*    101 101  --    CO2 24 26 27  --    BUN 12 9 6*  --    CREATININE 0.7* 0.5* 0.6*  --    CALCIUM 7.7* 7.7* 7.7*  --    GLUCOSE 351* 269* 50*  --         Hepatic Function Panel:   Recent Labs     21  1600 21  0431   AST 21 19   ALT 20 17   BILITOT 1.3* 1.8*   ALKPHOS 274* 171*       Recent Labs     21  1600   LIPASE 4.0*     Recent Labs     21  1600   PROTIME 20.7*   INR 1.77*     No results for input(s): PTT in the last 72 hours. No results for input(s): OCCULTBLD in the last 72 hours. Radiology Review:    CT HEAD WO CONTRAST   Final Result   1. Acute left frontal subarachnoid hemorrhage with hemorrhage also in the   left middle cerebral artery cistern. 2. The right-sided subdural hematoma may represent an acute on chronic   collection given the mixed attenuation. Critical results were called by Dr. Masood Angel MD to Reanna Stroud on   5/23/2021 at 05:49. VL Extremity Venous Bilateral         CT ABDOMEN PELVIS W IV CONTRAST Additional Contrast? Oral   Final Result   Increasing size of a pre aortic necrotic appearing lymph node. Increasing   amount of intra-abdominal ascites. Moderate left pleural effusion and small   right pleural effusion now present. Small left lower lobe pulmonary nodule   possibly representing metastatic disease. Masslike appearance of the   pancreatic head or possible malignant mass adjacent to the remaining   pancreas. Marked decrease in the size of an hepatic or perihepatic cyst.   Rupture of the cyst questioned moderate to severe anasarca now present. Nolan Emerson RECOMMENDATIONS:   Thoracentesis and paracentesis may be of diagnostic benefit for restaging of   disease. CT CHEST PULMONARY EMBOLISM W CONTRAST   Final Result   1. No evidence of pulmonary embolism. 2. Patchy left upper lobe airspace opacities are suspicious for pneumonia. Imaging features are atypical or uncommonly reported for COVID-19 pneumonia. Alternative diagnoses should be considered. PneAty   3. Small bilateral pleural effusions. 4. Coronary artery disease. 5. Small volume upper abdominal ascites and marked hepatic steatosis. 6. Increased size of bilateral thyroid nodules which now measure up to 2.9   cm. Suggest correlation with nonemergent thyroid ultrasound. XR CHEST PORTABLE   Final Result   No radiographic evidence of acute pulmonary disease.                Assessment:    Active Problems:    Essential hypertension    Diarrhea    Pancytopenia ([facility] [address]    Type 2 diabetes mellitus with other specified complication ([facility] [address]    Metastatic Pancreatic cancer     Mucositis    Oral thrush    Neutropenia (HCC)    Hypomagnesemia    Elevated lactic acid level    Immunocompromised state due to drug therapy  Resolved Problems:    * No resolved hospital problems. *     Diarrhea  -began after chemo so could be due to this. Stool studies negative  Pancytopenia -due to chemo.  Patient reported black stool but last stool charted here was brown. Metastatic pancreatic adenocarcinoma. CT A/P 5/21 showing possible progression of disease, more pleural effusions and ascites   S/p fall with subdural hematomas on CT. Transferred to ICU for close observation     Recommendations:   - Await CMV PCR  - continue supportive care  - PPI  - fiber daily  - we will consider endoscopic if diarrhea persists (but would need to wait until pancytopenia improves).  With new intracranial bleed, family does not want any endoscopic evaluation at this time      Mona Hernandez MD, MSc  [facility]  05/23/21

## 2021-05-23 NOTE — PROGRESS NOTES
Hospitalist Progress Note      PCP: Chacorta Martin MD    Date of Admission: 5/20/2021    Chief Complaint: Diarrhea    Subjective: Patient was resting comfortably in bed. Family member was present at bedside. Questions were answered. She was very curious as to what the oncologist thought in regards to the CT findings. Medications:  Reviewed    Infusion Medications    sodium chloride      sodium chloride      sodium chloride      sodium chloride 75 mL/hr at 05/23/21 1137    dextrose 100 mL/hr (05/22/21 6975)     Scheduled Medications    pantoprazole  40 mg Oral QAM AC    psyllium  1 packet Oral Daily    albumin human  25 g Intravenous Q12H    digoxin  125 mcg Oral Daily    dilTIAZem  60 mg Oral BID    morphine  15 mg Oral 3 times per day    tamsulosin  0.4 mg Oral BID    sodium chloride flush  5-40 mL Intravenous 2 times per day     PRN Meds: sodium chloride, LORazepam, sodium chloride, potassium chloride **OR** potassium alternative oral replacement **OR** potassium chloride, perflutren lipid microspheres, cyclobenzaprine, magic (miracle) mouthwash with nystatin, sodium chloride flush, sodium chloride, promethazine **OR** ondansetron, polyethylene glycol, acetaminophen **OR** acetaminophen, morphine **OR** morphine, glucose, dextrose, glucagon (rDNA), dextrose      Intake/Output Summary (Last 24 hours) at 5/23/2021 1613  Last data filed at 5/23/2021 0800  Gross per 24 hour   Intake 320 ml   Output 1200 ml   Net -880 ml       Exam:    BP (!) 143/84   Pulse 115   Temp 98.4 °F (36.9 °C) (Temporal)   Resp 23   Ht 5' 8\" (1.727 m)   Wt 196 lb 6.4 oz (89.1 kg)   SpO2 96%   BMI 29.86 kg/m²     General appearance: No apparent distress, appears stated age and cooperative. HEENT: Pupils equal, round, and reactive to light. Conjunctivae/corneas clear. Neck: Supple, with full range of motion. No jugular venous distention. Trachea midline. Respiratory:  Normal respiratory effort.  Clear to auscultation, bilaterally without Rales/Wheezes/Rhonchi. Cardiovascular: Regular rate and rhythm with normal S1/S2 without murmurs, rubs or gallops. Abdomen: Soft, non-tender, non-distended with normal bowel sounds. Musculoskeletal: No clubbing, cyanosis or edema bilaterally. Full range of motion without deformity. Skin: Skin color, texture, turgor normal.  Ecchymosis present on the back as well as bilateral upper extremities  Neurologic:  Neurovascularly intact without any focal sensory/motor deficits. Cranial nerves: II-XII intact, grossly non-focal.  Psychiatric: Alert and oriented, thought content appropriate, normal insight    Labs:   Recent Labs     05/22/21  0431 05/23/21  0630 05/23/21  0907   WBC 0.1* 0.3* 0.3*   HGB 7.4* 7.8* 7.6*   HCT 22.2* 23.2* 23.0*   PLT 16* 19* 21*     Recent Labs     05/21/21  0620 05/22/21  0431 05/22/21  1950 05/23/21  0630   * 133*  --  132*   K 3.3* 2.7* 3.0* 3.5    101  --  100   CO2 26 27  --  23   BUN 9 6*  --  5*   CREATININE 0.5* 0.6*  --  0.6*   CALCIUM 7.7* 7.7*  --  7.7*     Recent Labs     05/22/21  0431   AST 19   ALT 17   BILITOT 1.8*   ALKPHOS 171*     Recent Labs     05/23/21  0907   INR 2.02*     No results for input(s): CKTOTAL, TROPONINI in the last 72 hours. Assessment/Plan:    Active Hospital Problems    Diagnosis Date Noted    Mucositis [K12.30] 05/20/2021    Oral thrush [B37.0] 05/20/2021    Neutropenia ([address] [D70.9] 05/20/2021    Hypomagnesemia [E83.42] 05/20/2021    Elevated lactic acid level [R79.89] 05/20/2021    Immunocompromised state due to drug therapy [D84.821, Z79.899] 05/20/2021    Metastatic Pancreatic cancer  [C25.9] 03/19/2021    Type 2 diabetes mellitus with other specified complication ([facility] [S37.13] 01/09/2019    Pancytopenia ([facility] [D61.818] 08/29/2018    Diarrhea [R19.7] 05/14/2018    Essential hypertension [I10] 10/24/2012     1. Acute noninfectious colitis -negative for C. difficile. GI panel is negative. Likely due to chemotherapy. GI recommendations appreciated. 2.  Metastatic pancreatic adenocarcinoma status post Whipple procedure -recent CT abdomen and pelvis does show preaortic necrotic lymph nodes, abdominal ascites, and bilateral pleural effusions. Will discuss with oncology  3. Hypokalemia and hypomagnesemia -replace electrolytes and recheck levels in the a.m.  4.  Pancytopenia status post 1 platelet and 2 PRBCs -follow-up with oncology recommendations    Per imaging and from nursing, patient is not doing well on chemo and is seeing progression of cancer on ct. Discussed with patient that additional chemo is likely more to accelerate decline. Discussed hospice and [facility]  They have changed code status and [facility] is consulted. DVT Prophylaxis: SCDs bilaterally  Diet: No diet orders on file  Code Status: DNR-CC    PT/OT Eval Status: On board    Dispo -probable d/c to hospice soon.   Angela Soliman MD
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
## 2021-05-24 NOTE — PLAN OF CARE
Problem: Pain:  Goal: Pain level will decrease  Description: Pain level will decrease  Outcome: Ongoing   Medicating for pain as needed    Problem: Skin Integrity:  Goal: Will show no infection signs and symptoms  Description: Will show no infection signs and symptoms  Outcome: Ongoing   Preventative measures in place. Problem: Nutrition  Goal: Optimal nutrition therapy  Outcome: Ongoing   Eats what he wants for comfort    Problem: Falls - Risk of:  Goal: Will remain free from falls  Description: Will remain free from falls  Outcome: Ongoing   Fall precautions in place    Problem: Serum Glucose Level - Abnormal:  Goal: Ability to maintain appropriate glucose levels will improve  Description: Ability to maintain appropriate glucose levels will improve  Outcome: Ongoing   Pt [facility] hospice, not longr doing these.
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
## 2021-05-24 NOTE — CARE COORDINATION
Social Work Discharge Summary    Date of Discharge: 5/24/21  Disposition: Home with Hospice  Level of Care: [address]:  Name: [facility])  Phone: [phone] / Fax: [phone]    Transportation:   The patient will be transported via: [address] [place] [address] Place [facility]. Patient will be picked up at (3-3:30 PM) via ([address]).     Josr Ache MSW, [address]

## 2021-05-24 NOTE — PROGRESS NOTES
CLINICAL PHARMACY NOTE: MEDS TO BEDS    Total # of Prescriptions Filled: 1   The following medications were delivered to the patient:  · Magic Mouthwash    Additional Documentation:    Patient's daughter/POA signed for one RX bedside

## 2021-05-24 NOTE — PROGRESS NOTES
[facility]   Progress Note      5/24/2021 8:18 AM        Name: Cesia Almeida . Admitted: 5/20/2021    SUBJECTIVE:  Patient resting comfortably in bed, he has pain in head, mouth and back, currently controlled with pain meds, daughters at bedside.      Reviewed interval ancillary notes    Current Medications  0.9 % sodium chloride infusion, PRN  LORazepam (ATIVAN) injection 2 mg, Q2H PRN  0.9 % sodium chloride infusion, PRN  potassium chloride (KLOR-CON M) extended release tablet 40 mEq, PRN   Or  potassium bicarb-citric acid (EFFER-K) effervescent tablet 40 mEq, PRN   Or  potassium chloride 10 mEq/100 mL IVPB (Peripheral Line), PRN  pantoprazole (PROTONIX) tablet 40 mg, QAM AC  psyllium (HYDROCIL) 95 % packet 1 packet, Daily  albumin human 25 % IV solution 25 g, Q12H  perflutren lipid microspheres (DEFINITY) injection 1.65 mg, ONCE PRN  cyclobenzaprine (FLEXERIL) tablet 5 mg, TID PRN  magic (miracle) mouthwash with nystatin, 4x Daily PRN  digoxin (LANOXIN) tablet 125 mcg, Daily  dilTIAZem (CARDIZEM 12 HR) extended release capsule 60 mg, BID  morphine (MS CONTIN) extended release tablet 15 mg, 3 times per day  tamsulosin (FLOMAX) capsule 0.4 mg, BID  sodium chloride flush 0.9 % injection 5-40 mL, 2 times per day  sodium chloride flush 0.9 % injection 5-40 mL, PRN  0.9 % sodium chloride infusion, PRN  promethazine (PHENERGAN) tablet 12.5 mg, Q6H PRN   Or  ondansetron (ZOFRAN) injection 4 mg, Q6H PRN  polyethylene glycol (GLYCOLAX) packet 17 g, Daily PRN  acetaminophen (TYLENOL) tablet 650 mg, Q6H PRN   Or  acetaminophen (TYLENOL) suppository 650 mg, Q6H PRN  morphine (PF) injection 2 mg, Q2H PRN   Or  morphine injection 4 mg, Q2H PRN  0.9 % sodium chloride infusion, Continuous  glucose (GLUTOSE) 40 % oral gel 15 g, PRN  dextrose 50 % IV solution, PRN  glucagon (rDNA) injection 1 mg, PRN  dextrose 5 % solution, PRN        Objective:  BP 95/63   Pulse 96   Temp 98 °F (36.7 °C) (Temporal)   Resp 15   Ht 5' 8\" (1.727 m)   Wt 196 lb 6.4 oz (89.1 kg)   SpO2 96%   BMI 29.86 kg/m²     Intake/Output Summary (Last 24 hours) at 5/24/2021 0818  Last data filed at 5/24/2021 0535  Gross per 24 hour   Intake 1966.4 ml   Output 600 ml   Net 1366.4 ml      Wt Readings from Last 3 Encounters:   05/20/21 196 lb 6.4 oz (89.1 kg)   03/19/21 187 lb 8 oz (85 kg)   02/24/21 174 lb (78.9 kg)       General appearance:  Appears comfortable  Eyes: Sclera clear. Pupils equal.  ENT: Moist oral mucosa. Trachea midline, no adenopathy. Cardiovascular: Regular rhythm, normal S1, S2. No murmur. No edema in lower extremities  Respiratory: Not using accessory muscles. Good inspiratory effort. Clear to auscultation bilaterally, no wheeze or crackles. GI: Abdomen soft, no tenderness, not distended  Musculoskeletal: No cyanosis in digits, neck supple  Neurology: CN 2-12 grossly intact. No speech or motor deficits  Psych: Normal affect. Alert and oriented in time, place and person  Skin: Warm, dry, normal turgor    Labs and Tests:  CBC:   Recent Labs     05/22/21  0431 05/23/21  0630 05/23/21  0907   WBC 0.1* 0.3* 0.3*   HGB 7.4* 7.8* 7.6*   PLT 16* 19* 21*     BMP:    Recent Labs     05/22/21  0431 05/22/21  1950 05/23/21  0630   *  --  132*   K 2.7* 3.0* 3.5     --  100   CO2 27  --  23   BUN 6*  --  5*   CREATININE 0.6*  --  0.6*   GLUCOSE 50*  --  74     Hepatic:   Recent Labs     05/22/21  0431   AST 19   ALT 17   BILITOT 1.8*   ALKPHOS 171*       ASSESSMENT AND PLAN    Active Problems:    Essential hypertension    Diarrhea    Pancytopenia (HCC)    Type 2 diabetes mellitus with other specified complication (HCC)    Metastatic Pancreatic cancer     Mucositis    Oral thrush    Neutropenia (HCC)    Hypomagnesemia    Elevated lactic acid level    Immunocompromised state due to drug therapy  Resolved Problems:    * No resolved hospital problems.  *      Pancreatic carcinoma  - previous treatment as stated above  - currently receiving FOLFOX, last tx 5/11/21/21 with Neulasta  - he has had a difficult time tolerating chemotherapy even at reduced doses, chemotherapy will be held. Patient is hospice appropriate. Pancytopenia  - d/t chemotherapy  - neutropenia without fever  - s/p Neulasta on 5/11/21  - hgb 7.6, on 5/23/21  - plts 21 on 5/23/21     Diarrhea  - likely d/t chemotherapy  - C-diff negative, fecal leukocytes negative, other stool testing pending  - GI consult pending     Hypokalemia  - replace per protocol     Hypomagnesemia  - magnesium replacement prn     ICH  - Due to trauma from a fall  - CT head on 5/23/21 showed stable hemorrhage and stable hematoma    Patient and family are meeting with hospice today and will taking the patient home. Ashleigh Bonilla CNP  [facility]    Patient was seen and examined. Agree with above. Discussed with patient's daughters. The family and the patient have appropriately decided on hospice.     Kashif Kebede MD

## 2021-07-01 NOTE — DISCHARGE SUMMARY
Hospital Medicine Discharge Summary    Patient: Linda Jimenez     Gender: male  : 1960   Age: 63 y.o. MRN: [id number]    Admitting Physician: Linda Mclaughlin MD  Discharge Physician: Jeannine Arriaga MD     Code Status: Prior     Admit Date: 2021   Discharge Date: 2021      Disposition:  Hospice. Discharge Diagnoses: Active Hospital Problems    Diagnosis Date Noted    Mucositis [K12.30] 2021    Oral thrush [B37.0] 2021    Neutropenia ([address] [D70.9] 2021    Hypomagnesemia [E83.42] 2021    Elevated lactic acid level [R79.89] 2021    Immunocompromised state due to drug therapy ([address] [M05.254, Z79.899] 2021    Metastatic Pancreatic cancer  [C25.9] 2021    Type 2 diabetes mellitus with other specified complication ([address] [S29.04] 2019    Pancytopenia ([address] [D61.818] 2018    Diarrhea [R19.7] 2018    Essential hypertension [I10] 10/24/2012       Follow-up appointments:  none    Outpatient to do list: be comfortable    Condition at Discharge:  Terminal    Hospital Course:      1. Acute noninfectious colitis -negative for C. difficile. GI panel is negative. Likely due to chemotherapy. GI recommendations appreciated. 2.  Metastatic pancreatic adenocarcinoma status post Whipple procedure -recent CT abdomen and pelvis does show preaortic necrotic lymph nodes, abdominal ascites, and bilateral pleural effusions. Family has decided to move toward comfort care and hospice was consulted. Patient is discharging to hospice. 3.  Hypokalemia and hypomagnesemia -replace electrolytes and recheck levels in the a.m.  4.  Pancytopenia status post 1 platelet and 2 PRBCs -follow-up with oncology recommendations     Per imaging and from nursing, patient is not doing well on chemo and is seeing progression of cancer on ct. Discussed with patient that additional chemo is likely more to accelerate decline.  Discussed hospice and [id number] [place]  They have changed code status and HOC is consulted, patient is going to d/c to hospice today. Discharge Medications:   Discharge Medication List as of 5/24/2021 12:24 PM      START taking these medications    Details   morphine sulfate 20 MG/ML concentrated oral solution Take 0.5 mLs by mouth every 2 hours as needed for Pain for up to 3 days. , Disp-1 Bottle, R-0Print      LORazepam (ATIVAN) 2 MG/ML concentrated solution Take 1 mL by mouth every 4 hours as needed (Anxiety, SOB, terminal agitation, agonal respirations) for up to 14 doses. , Disp-1 Bottle, R-0Print           Discharge Medication List as of 5/24/2021 12:24 PM        Discharge Medication List as of 5/24/2021 12:24 PM      CONTINUE these medications which have NOT CHANGED    Details   cyclobenzaprine (FLEXERIL) 5 MG tablet TAKE 1 TABLET BY MOUTH 3  TIMES DAILY AS NEEDED FOR  MUSCLE SPASMS, Disp-270 tablet, R-0Normal      HUMALOG KWIKPEN 100 UNIT/ML SOPN INJECT 6 UNITS  SUBCUTANEOUSLY 3 TIMES  DAILY (BEFORE MEALS), Disp-15 mL, R-3Requesting 1 year supplyNormal      tamsulosin (FLOMAX) 0.4 MG capsule Take 1 capsule by mouth 2 times daily, Disp-180 capsule,R-1Normal      blood glucose test strips (FREESTYLE LITE) strip 1 each by In Vitro route 3 times daily, Disp-300 each, R-3Normal      cetirizine (ZYRTEC) 10 MG tablet Take 10 mg by mouth dailyHistorical Med      digoxin (LANOXIN) 125 MCG tablet Take 1 tablet by mouth daily, Disp-90 tablet, R-1Normal      dilTIAZem (CARDIZEM 12 HR) 60 MG extended release capsule Take 1 capsule by mouth 2 times daily, Disp-180 capsule, R-1Normal      morphine (MS CONTIN) 15 MG extended release tablet Historical Med           Discharge Medication List as of 5/24/2021 12:24 PM      STOP taking these medications       potassium chloride (KLOR-CON M) 20 MEQ extended release tablet Comments:   Reason for Stopping:         allopurinol (ZYLOPRIM) 300 MG tablet Comments:   Reason for Stopping:         insulin glargine (LANTUS;BASAGLAR) 100 UNIT/ML injection pen Comments:   Reason for Stopping:         ibuprofen (ADVIL;MOTRIN) 800 MG tablet Comments:   Reason for Stopping:         Blood Glucose Monitoring Suppl (FREESTYLE LITE) KENTRELL Comments:   Reason for Stopping:         FreeStyle Lancets MISC Comments:   Reason for Stopping:         Insulin Pen Needle 32G X 4 MM MISC Comments:   Reason for Stopping:         aspirin EC 81 MG EC tablet Comments:   Reason for Stopping:         HYDROcodone-acetaminophen (Bertrum Och) 5-325 MG per tablet Comments:   Reason for Stopping:               Discharge ROS:  A complete review of systems was asked and negative except for generalized malaise and weakness. Discharge Exam:    /81   Pulse 114   Temp 98 °F (36.7 °C) (Temporal)   Resp 15   Ht 5' 8\" (1.727 m)   Wt 196 lb 6.4 oz (89.1 kg)   SpO2 96%   BMI 29.86 kg/m²   General appearance: No apparent distress, appears stated age and cooperative. HEENT: Pupils equal, round, and reactive to light. Conjunctivae/corneas clear. Neck: Supple, with full range of motion. No jugular venous distention. Trachea midline. Respiratory:  Normal respiratory effort. Clear to auscultation, bilaterally without Rales/Wheezes/Rhonchi. Cardiovascular: Regular rate and rhythm with normal S1/S2 without murmurs, rubs or gallops. Abdomen: Soft, non-tender, non-distended with normal bowel sounds. Musculoskeletal: No clubbing, cyanosis or edema bilaterally. Full range of motion without deformity. Skin: Skin color, texture, turgor normal.  Ecchymosis present on the back as well as bilateral upper extremities  Neurologic:  Neurovascularly intact without any focal sensory/motor deficits. Cranial nerves: II-XII intact, grossly non-focal.  Psychiatric: Alert and oriented, thought content appropriate, normal insight     Labs:   Labs:  For convenience and continuity at follow-up the following most recent labs are provided:    Lab Results   Component Value Date    WBC 0.3 05/23/2021    HGB 7.6 05/23/2021    HCT 23.0 05/23/2021    MCV 87.9 05/23/2021    PLT 21 05/23/2021     05/23/2021    K 3.5 05/23/2021    K 3.0 05/22/2021     05/23/2021    CO2 23 05/23/2021    BUN 5 05/23/2021    CREATININE 0.6 05/23/2021    CALCIUM 7.7 05/23/2021    PHOS 2.5 03/12/2018    ALKPHOS 171 05/22/2021    ALT 17 05/22/2021    AST 19 05/22/2021    BILITOT 1.8 05/22/2021    BILIDIR <0.2 12/29/2020    LABALBU 2.8 05/22/2021    LDLCALC 113 05/20/2017    TRIG 187 02/22/2018     Lab Results   Component Value Date    INR 2.02 (H) 05/23/2021    INR 1.77 (H) 05/20/2021    INR 1.39 (H) 12/29/2020       Radiology:  No results found. The patient was seen and examined on day of discharge and this discharge summary is in conjunction with any daily progress note from day of discharge. Time Spent on discharge is 35 minutes  in the examination, evaluation, counseling and review of medications and discharge plan. Note that greater  than 30 minutes was spent in preparing discharge papers, discussing discharge with patient, medication review, etc.       Signed:    Cristiano Prescott MD   7/1/2021      Thank you Janet Livingston MD for the opportunity to be involved in this patient's care.  If you have any questions or concerns please feel free to contact me at [phone]

## 2022-04-27 NOTE — PROGRESS NOTES
Dr Daphne Mead calls this RN back. This RN informs him tracing appropriate for gestational age. Moderate variability with accelerations. Pt feeling more movement at this time. Occas ctxs per Jatinder Mead speaks to pt per telephone. Orders received at this time. (FREESTYLE LITE) KENTRELL 1 Device by Does not apply route 3 times daily 1 Device 0    blood glucose test strips (FREESTYLE LITE) strip 1 each by In Vitro route 3 times daily As needed. 300 each 3    ibuprofen (ADVIL;MOTRIN) 800 MG tablet TAKE 1 TABLET BY MOUTH 3 TIMES DAILY WITH MEALS 90 tablet 2    blood glucose test strips (ONE TOUCH ULTRA TEST) strip 1 each by In Vitro route 3 times daily As needed. 100 strip 5    Insulin Pen Needle 32G X 4 MM MISC 1 each by Does not apply route 4 times daily 200 each 3    furosemide (LASIX) 20 MG tablet TAKE 1 TABLET BY MOUTH EVERY DAY 30 tablet 5    cyclobenzaprine (FLEXERIL) 5 MG tablet TAKE 1 TABLET BY MOUTH 3 TIMES A DAY AS NEEDED FOR MUSCLE SPASMS 30 tablet 2    insulin lispro (HUMALOG KWIKPEN) 100 UNIT/ML pen Inject 8 Units into the skin 3 times daily (before meals) 10 pen 1    lidocaine (LIDODERM) 5 % Place 1 patch onto the skin daily 12 hours on, 12 hours off. (Patient taking differently: Place 1 patch onto the skin daily as needed 12 hours on, 12 hours off.) 30 patch 0    ONETOUCH DELICA LANCETS 72W MISC TEST 4 TIMES DAILY 400 each 1    potassium bicarb-citric acid (EFFER-K) 20 MEQ TBEF effervescent tablet Take 20 mEq by mouth 2 times daily       Blood Glucose Monitoring Suppl (ONE TOUCH ULTRA 2) w/Device KIT 1 kit by Does not apply route daily 1 kit 0    tamsulosin (FLOMAX) 0.4 MG capsule Take 1 capsule by mouth 2 times daily 30 capsule 3    aspirin EC 81 MG EC tablet Take 1 tablet by mouth daily 30 tablet 11       No Known Allergies    Social History     Tobacco Use    Smoking status: Never Smoker    Smokeless tobacco: Never Used   Substance Use Topics    Alcohol use: No       /68 (Site: Left Upper Arm, Position: Sitting, Cuff Size: Medium Adult)   Pulse 94   Resp 12   Ht 5' 7\" (1.702 m)   Wt 226 lb 2 oz (102.6 kg)   SpO2 99%   BMI 35.42 kg/m²     Objective:   Physical Exam  Constitutional:       General: He is not in acute distress.

## 2025-04-09 NOTE — DISCHARGE SUMMARY
Loyd Gonzalez is a 58 y.o. male who presents to the office today for the following:  Chief Complaint   Patient presents with    Asthma    Medication Refill       Allergies   Allergen Reactions    Hydrocodone-Acetaminophen Nausea And Vomiting    Penicillins Anaphylaxis and Angioedema    Doxycycline Itching     Black eyes    Hydrochlorothiazide Nausea Only     Dizzy, lightheaded, blisters on his legs    Pantoprazole Dermatitis and Itching     Patient denies allergy    Azithromycin Nausea And Vomiting    Erythromycin Other (See Comments)         Current Outpatient Medications:     albuterol sulfate HFA (PROVENTIL;VENTOLIN;PROAIR) 108 (90 Base) MCG/ACT inhaler, Inhale 2 puffs into the lungs every 6 hours as needed for Wheezing or Shortness of Breath, Disp: 18 g, Rfl: 5    fluticasone furoate-vilanterol (BREO ELLIPTA) 100-25 MCG/ACT inhaler, Inhale 1 puff into the lungs daily, Disp: 28 each, Rfl: 5    ondansetron (ZOFRAN-ODT) 4 MG disintegrating tablet, Take 1 tablet by mouth 3 times daily as needed for Nausea or Vomiting, Disp: 21 tablet, Rfl: 3    sodium chloride (OCEAN) 0.65 % nasal spray, 1 spray by Nasal route as needed for Congestion, Disp: 1 each, Rfl: 3    vitamin C (ASCORBIC ACID) 500 MG tablet, Take 1 tablet by mouth daily, Disp: , Rfl:     Cholecalciferol (VITAMIN D) 10 MCG (400 UNIT) CAPS Capsule, Take 1 capsule by mouth daily, Disp: , Rfl:     tamsulosin (FLOMAX) 0.4 MG capsule, Take 1 capsule by mouth daily, Disp: 90 capsule, Rfl: 3    famotidine (PEPCID) 10 MG tablet, Take 2 tablets by mouth Daily, Disp: , Rfl:     gabapentin (NEURONTIN) 600 MG tablet, Take 1 tablet by mouth 3 times daily for 90 days. Max Daily Amount: 1,800 mg, Disp: 270 tablet, Rfl: 0    cyclobenzaprine (FLEXERIL) 10 MG tablet, Take 1 tablet by mouth as needed for pain, Disp: 30 tablet, Rfl: 0    budesonide-formoterol (SYMBICORT) 160-4.5 MCG/ACT AERO, Inhale 1 puff into the lungs 2 times daily, Disp: 10.2 g, Rfl: 5    acetaminophen  Hospital Medicine Discharge Summary    Patient ID: Rolando Posey      Patient's PCP: Kwame Lamar MD    Admit Date: 3/18/2021     Discharge Date: 3/23/2021      Admitting Physician: Pat Hopkins MD     Discharge Physician: Jennifer Duque MD     Discharge Diagnoses: Active Hospital Problems    Diagnosis    Candidiasis [B37.9]    Admission for fitting of Port-A-Cath [Z45.2]    Benign prostatic hyperplasia without lower urinary tract symptoms [N40.0]    Overweight (BMI 25.0-29. 9) [E66.3]    Weight loss counseling, encounter for [Z71.3]    Diabetes education, encounter for [Z71.89]    Pancreatic cancer ([address] [C25.9]    Neutropenic fever ([address] [D70.9, R50.81]    Type 2 diabetes mellitus with other specified complication ([address] [I42.12]    Severe malnutrition ([address] [E43]       The patient was seen and examined on day of discharge and this discharge summary is in conjunction with any daily progress note from day of discharge. Hospital Course:     Patient with history of pancreatic cancer on chemotherapy, hypertension, diabetes, patient was sent to the emergency room because of abnormal lab of neutropenia white count 0.03 patient also had fever complain of some sore throat and fatigue in the emergency room temperature was 98.5 and patient is admitted for neutropenic fever has some diarrhea no productive cough chest x-ray was unremarkable no pneumonia    Neutropenic fever  Empirically treated with cefepime and fluconazole  Cefepime stopped prior to discharge, ID recommended fluconazole for additional week  Patient received Neupogen for pancytopenia as per heme-onc  Cleared by consulting services for discharge        Physical Exam Performed:     /78   Pulse 76   Temp 97.8 °F (36.6 °C) (Oral)   Resp 18   Ht 5' 8\" (1.727 m)   Wt 187 lb 8 oz (85 kg)   SpO2 98%   BMI 28.51 kg/m²       General appearance:  No apparent distress, appears stated age and cooperative.   HEENT: Normal cephalic, atraumatic without obvious deformity. Pupils equal, round, and reactive to light. Extra ocular muscles intact. Conjunctivae/corneas clear. Neck: Supple, with full range of motion. No jugular venous distention. Trachea midline. Respiratory:  Normal respiratory effort. Clear to auscultation, bilaterally without Rales/Wheezes/Rhonchi. Cardiovascular:  Regular rate and rhythm with normal S1/S2 without murmurs, rubs or gallops. Abdomen: Soft, non-tender, non-distended with normal bowel sounds. Musculoskeletal:  No clubbing, cyanosis or edema bilaterally. Full range of motion without deformity. Skin: Skin color, texture, turgor normal.  No rashes or lesions. Neurologic:  Neurovascularly intact without any focal sensory/motor deficits. Cranial nerves: II-XII intact, grossly non-focal.  Psychiatric:  Alert and oriented, thought content appropriate, normal insight  Capillary Refill: Brisk,< 3 seconds   Peripheral Pulses: +2 palpable, equal bilaterally       Labs:  For convenience and continuity at follow-up the following most recent labs are provided:      CBC:    Lab Results   Component Value Date    WBC 7.7 03/23/2021    HGB 8.3 03/23/2021    HCT 26.0 03/23/2021    PLT 37 03/23/2021       Renal:    Lab Results   Component Value Date     03/20/2021    K 3.5 03/20/2021     03/20/2021    CO2 25 03/20/2021    BUN 6 03/20/2021    CREATININE 0.6 03/20/2021    CALCIUM 8.0 03/20/2021    PHOS 2.5 03/12/2018         Significant Diagnostic Studies    Radiology:   XR CHEST PORTABLE   Final Result   Stable chest with no acute abnormality seen                Consults:     IP CONSULT TO HOSPITALIST  IP CONSULT TO INFECTIOUS DISEASES  IP CONSULT TO ONCOLOGY    Disposition: Home    Condition at Discharge: Stable    Discharge Instructions/Follow-up: PCP  Hematology oncology    Code Status:  Prior     Activity: activity as tolerated    Diet: cardiac diet and diabetic diet      Discharge Medications: Discharge Medication List as of 3/23/2021  2:03 PM           Details   fluconazole (DIFLUCAN) 200 MG tablet Take 2 tablets by mouth daily for 7 days, Disp-14 tablet, R-0Normal              Details   cetirizine (ZYRTEC) 10 MG tablet Take 10 mg by mouth dailyHistorical Med      digoxin (LANOXIN) 125 MCG tablet Take 1 tablet by mouth daily, Disp-90 tablet, R-1Normal      dilTIAZem (CARDIZEM 12 HR) 60 MG extended release capsule Take 1 capsule by mouth 2 times daily, Disp-180 capsule, R-1Normal      morphine (MS CONTIN) 15 MG extended release tablet Historical Med      HYDROcodone-acetaminophen (NORCO) 5-325 MG per tablet acetaminophen 325 MG / hydrocodone bitartrate 5 MG Oral Tablet     take 1 tablet by mouth every 6 hours PRN   ActiveHistorical Med      cyclobenzaprine (FLEXERIL) 5 MG tablet TAKE 1 TABLET BY MOUTH 3  TIMES DAILY AS NEEDED FOR  MUSCLE SPASMS, Disp-270 tablet, R-0Normal      HUMALOG KWIKPEN 100 UNIT/ML SOPN INJECT 6 UNITS  SUBCUTANEOUSLY 3 TIMES  DAILY (BEFORE MEALS), Disp-15 mL, R-3Requesting 1 year supplyNormal      potassium chloride (KLOR-CON M) 20 MEQ extended release tablet TAKE 1 TABLET BY MOUTH  TWICE DAILY, Disp-180 tablet, R-3Requesting 1 year supplyNormal      tamsulosin (FLOMAX) 0.4 MG capsule Take 1 capsule by mouth 2 times daily, Disp-180 capsule,R-1Normal      allopurinol (ZYLOPRIM) 300 MG tablet TAKE 1 TABLET BY MOUTH EVERY DAY, Disp-90 tablet,R-1Normal      insulin glargine (LANTUS;BASAGLAR) 100 UNIT/ML injection pen Inject 28 Units into the skin every morning, Disp-8 pen,R-3(Lantus solostar)Normal      Blood Glucose Monitoring Suppl (FREESTYLE LITE) KENTRELL 3 TIMES DAILY Starting Wed 4/29/2020, Disp-1 Device, R-0, Normal      blood glucose test strips (FREESTYLE LITE) strip 1 each by In Vitro route 3 times daily, Disp-300 each, R-3Normal      FreeStyle Lancets MISC DAILY Starting Wed 4/29/2020, Disp-100 each, R-3, Normal      Insulin Pen Needle 32G X 4 MM MISC 4 TIMES DAILY Starting Tue 10/15/2019, Disp-200 each, R-3, Normal      aspirin EC 81 MG EC tablet Take 1 tablet by mouth daily, Disp-30 tablet, R-11             Time Spent on discharge is more than 30 minutes in the examination, evaluation, counseling and review of medications and discharge plan.       Signed:    Electronically signed by Indira Powers MD on 3/25/2021 at 5:47 PM

## (undated) DEVICE — SHEET,DRAPE,53X77,STERILE: Brand: MEDLINE

## (undated) DEVICE — SUTURE VCRL SZ 3-0 L18IN ABSRB UD L26MM SH 1/2 CIR J864D

## (undated) DEVICE — GAUZE,SPONGE,4"X4",8PLY,STRL,LF,10/TRAY: Brand: MEDLINE

## (undated) DEVICE — CONTROL SYRINGE LUER-LOCK TIP: Brand: MONOJECT

## (undated) DEVICE — ADHESIVE SKIN CLSR 0.7ML TOP DERMBND ADV

## (undated) DEVICE — PROCEDURE KIT ENDOSCP CUST

## (undated) DEVICE — SET VLV 3 PC AWS DISPOSABLE GRDIAN SCOPEVALET

## (undated) DEVICE — PACK PROCEDURE SURG EXTREMITY MFFOP CUST

## (undated) DEVICE — MEDICINE CUP, GRADUATED, STER: Brand: MEDLINE

## (undated) DEVICE — GOWN AURORA NONREINF LG: Brand: MEDLINE INDUSTRIES, INC.

## (undated) DEVICE — DRAPE C ARM UNIV W41XL74IN CLR PLAS XR VELC CLSR POLY STRP

## (undated) DEVICE — SUTURE MCRYL SZ 4-0 L27IN ABSRB UD L19MM PS-2 1/2 CIR PRIM Y426H

## (undated) DEVICE — PENCIL SMK EVAC L10FT TBNG NONSTICK ESU BLDE PLUMEPEN ELITE

## (undated) DEVICE — 3 ML SYRINGE LUER-LOCK TIP: Brand: MONOJECT

## (undated) DEVICE — DECANTER FLD 9IN ST BG FOR ASEP TRNSF OF FLD

## (undated) DEVICE — SOLUTION IV IRRIG WATER 500ML POUR BRL ST 2F7113

## (undated) DEVICE — HYPODERMIC SAFETY NEEDLE: Brand: MAGELLAN

## (undated) DEVICE — GLOVE SURG SZ 6.5 L11.2IN FNGR THK9.8MIL STRW LTX POLYMER

## (undated) DEVICE — BLADE ES ELASTOMERIC COAT INSUL DURABLE BEND UPTO 90DEG

## (undated) DEVICE — DRAPE,T,LAPARO,TRANS,STERILE: Brand: MEDLINE

## (undated) DEVICE — INTENDED FOR TISSUE SEPARATION, AND OTHER PROCEDURES THAT REQUIRE A SHARP SURGICAL BLADE TO PUNCTURE OR CUT.: Brand: BARD-PARKER ® STAINLESS STEEL BLADES

## (undated) DEVICE — SUTURE PERMAHAND SZ 2-0 L18IN NONABSORBABLE BLK L26MM SH C012D

## (undated) DEVICE — TOWEL,OR,DSP,ST,BLUE,STD,4/PK,20PK/CS: Brand: MEDLINE

## (undated) DEVICE — NEEDLE HYPO 22GA L1.5IN BLK POLYPR HUB S STL REG BVL STR

## (undated) DEVICE — BW-412T DISP COMBO CLEANING BRUSH: Brand: SINGLE USE COMBINATION CLEANING BRUSH

## (undated) DEVICE — CHLORAPREP 26ML ORANGE